# Patient Record
Sex: MALE | Race: WHITE | NOT HISPANIC OR LATINO | Employment: FULL TIME | URBAN - METROPOLITAN AREA
[De-identification: names, ages, dates, MRNs, and addresses within clinical notes are randomized per-mention and may not be internally consistent; named-entity substitution may affect disease eponyms.]

---

## 2017-01-05 ENCOUNTER — CLINICAL SUPPORT (OUTPATIENT)
Dept: OTOLARYNGOLOGY | Facility: CLINIC | Age: 47
End: 2017-01-05
Payer: COMMERCIAL

## 2017-01-05 DIAGNOSIS — J30.9 ALLERGIC RHINITIS, UNSPECIFIED ALLERGIC RHINITIS TRIGGER, UNSPECIFIED RHINITIS SEASONALITY: ICD-10-CM

## 2017-01-05 PROCEDURE — 95165 ANTIGEN THERAPY SERVICES: CPT | Mod: 59,,, | Performed by: PEDIATRICS

## 2017-01-05 PROCEDURE — 99999 PR PBB SHADOW E&M-EST. PATIENT-LVL I: CPT | Mod: PBBFAC,,,

## 2017-01-05 PROCEDURE — 95117 IMMUNOTHERAPY INJECTIONS: CPT | Mod: ,,, | Performed by: PEDIATRICS

## 2017-01-05 NOTE — PROGRESS NOTES
Date of treatment initiation: 04/09/2015  Initial SNOT-20 score: 28  Date of last followup visit with referring physician: 06/10/2016  Date next followup visit is due:06/2017  Most recent SNOT-20 score:  0  Date SNOT-20 is due: 2017    No Known Drug Allergies    Ordering Physician: Dr. Kurtz      MAINTENANCE VIALS - MANUFACTURED BY [employer]    Mix #1 - LOT# [id number]  EXP: 10/10/2017  Allergens: trees  Mix #2 - LOT# [id number]  EXP: 10/10/2017  Allergens: weeds and grassed  Mix #3 - LOT# [id number]  EXP: 10/10/2017  Allergens: molds, mites, cockroach, jason, dog, feathers      Administered 0.1 mL of red maintenance vial -  mix #1 and #2 in left arm & mix #3 in right arm subcutaneously at 1520 pm manufactured by [employer]. Patient reports that he will wait in the building for 20 minutes after injection, he states that he will contact our office with any questions, concerns, or signs of a reaction.

## 2017-01-12 ENCOUNTER — CLINICAL SUPPORT (OUTPATIENT)
Dept: OTOLARYNGOLOGY | Facility: CLINIC | Age: 47
End: 2017-01-12
Payer: COMMERCIAL

## 2017-01-12 DIAGNOSIS — J30.9 ALLERGIC RHINITIS, UNSPECIFIED ALLERGIC RHINITIS TRIGGER, UNSPECIFIED RHINITIS SEASONALITY: ICD-10-CM

## 2017-01-12 PROCEDURE — 99999 PR PBB SHADOW E&M-EST. PATIENT-LVL I: CPT | Mod: PBBFAC,,,

## 2017-01-12 PROCEDURE — 95117 IMMUNOTHERAPY INJECTIONS: CPT | Mod: S$GLB,,, | Performed by: PEDIATRICS

## 2017-01-12 NOTE — PROGRESS NOTES
Date of treatment initiation: 04/09/2015  Initial SNOT-20 score: 28  Date of last followup visit with referring physician: 06/10/2016  Date next followup visit is due:06/2017  Most recent SNOT-20 score:  0  Date SNOT-20 is due: 2017    No Known Drug Allergies    Ordering Physician: Dr. Kurtz      MAINTENANCE VIALS - MANUFACTURED BY [email or web address]    Mix #1 - LOT# [id number]  EXP: 10/10/2017  Allergens: trees  Mix #2 - LOT# [id number]  EXP: 10/10/2017  Allergens: weeds and grassed  Mix #3 - LOT# [id number]  EXP: 10/10/2017  Allergens: molds, mites, cockroach, jason, dog, feathers      Administered 0.3 mL of red maintenance vial -  mix #1 and #2 in left arm & mix #3 in right arm subcutaneously at 1535 pm manufactured by [employer]. Patient reports that he will wait in the building for 20 minutes after injection, he states that he will contact our office with any questions, concerns, or signs of a reaction.

## 2017-01-16 RX ORDER — MONTELUKAST SODIUM 10 MG/1
10 TABLET ORAL NIGHTLY
Qty: 30 TABLET | Refills: 12 | Status: SHIPPED | OUTPATIENT
Start: 2017-01-16 | End: 2017-07-10 | Stop reason: SDUPTHER

## 2017-01-16 RX ORDER — FLUTICASONE PROPIONATE 50 MCG
2 SPRAY, SUSPENSION (ML) NASAL DAILY
Qty: 2 BOTTLE | Refills: 12 | Status: SHIPPED | OUTPATIENT
Start: 2017-01-16 | End: 2017-07-10 | Stop reason: SDUPTHER

## 2017-01-25 ENCOUNTER — CLINICAL SUPPORT (OUTPATIENT)
Dept: OTOLARYNGOLOGY | Facility: CLINIC | Age: 47
End: 2017-01-25
Payer: COMMERCIAL

## 2017-01-25 DIAGNOSIS — J30.9 ALLERGIC RHINITIS, UNSPECIFIED ALLERGIC RHINITIS TRIGGER, UNSPECIFIED RHINITIS SEASONALITY: ICD-10-CM

## 2017-01-25 PROCEDURE — 95117 IMMUNOTHERAPY INJECTIONS: CPT | Mod: S$GLB,,, | Performed by: ORTHOPAEDIC SURGERY

## 2017-01-25 PROCEDURE — 99999 PR PBB SHADOW E&M-EST. PATIENT-LVL I: CPT | Mod: PBBFAC,,,

## 2017-01-26 NOTE — PROGRESS NOTES
Date of treatment initiation: 04/09/2015  Initial SNOT-20 score: 28  Date of last followup visit with referring physician: 06/10/2016  Date next followup visit is due:06/2017  Most recent SNOT-20 score:  0  Date SNOT-20 is due: 2017    No Known Drug Allergies    Ordering Physician: Dr. Kurtz      MAINTENANCE VIALS - MANUFACTURED BY [employer]    Mix #1 - LOT# [id number]  EXP: 10/10/2017  Allergens: trees  Mix #2 - LOT# [id number]  EXP: 10/10/2017  Allergens: weeds and grassed  Mix #3 - LOT# [id number]  EXP: 10/10/2017  Allergens: molds, mites, cockroach, jason, dog, feathers      Administered 0.5 mL of red maintenance vial -  mix #1 and #2 in left arm & mix #3 in right arm subcutaneously at 1555 pm manufactured by [employer]. Patient reports that he will wait in the building for 20 minutes after injection, he states that he will contact our office with any questions, concerns, or signs of a reaction.

## 2017-02-02 ENCOUNTER — CLINICAL SUPPORT (OUTPATIENT)
Dept: OTOLARYNGOLOGY | Facility: CLINIC | Age: 47
End: 2017-02-02
Payer: COMMERCIAL

## 2017-02-02 ENCOUNTER — OFFICE VISIT (OUTPATIENT)
Dept: OPHTHALMOLOGY | Facility: CLINIC | Age: 47
End: 2017-02-02
Payer: COMMERCIAL

## 2017-02-02 DIAGNOSIS — Z13.5 GLAUCOMA SCREENING: ICD-10-CM

## 2017-02-02 DIAGNOSIS — J30.9 ALLERGIC RHINITIS, UNSPECIFIED ALLERGIC RHINITIS TRIGGER, UNSPECIFIED RHINITIS SEASONALITY: ICD-10-CM

## 2017-02-02 DIAGNOSIS — Z01.00 VISIT FOR EYE AND VISION EXAM: Primary | ICD-10-CM

## 2017-02-02 DIAGNOSIS — H52.7 REFRACTIVE ERROR: ICD-10-CM

## 2017-02-02 PROCEDURE — 99999 PR PBB SHADOW E&M-EST. PATIENT-LVL I: CPT | Mod: PBBFAC,,, | Performed by: OPTOMETRIST

## 2017-02-02 PROCEDURE — 99999 PR PBB SHADOW E&M-EST. PATIENT-LVL I: CPT | Mod: PBBFAC,,,

## 2017-02-02 PROCEDURE — 92015 DETERMINE REFRACTIVE STATE: CPT | Mod: S$GLB,,, | Performed by: OPTOMETRIST

## 2017-02-02 PROCEDURE — 92014 COMPRE OPH EXAM EST PT 1/>: CPT | Mod: S$GLB,,, | Performed by: OPTOMETRIST

## 2017-02-02 PROCEDURE — 95117 IMMUNOTHERAPY INJECTIONS: CPT | Mod: S$GLB,,, | Performed by: PEDIATRICS

## 2017-02-02 NOTE — PROGRESS NOTES
Date of treatment initiation: 04/09/2015  Initial SNOT-20 score: 28  Date of last followup visit with referring physician: 06/10/2016  Date next followup visit is due:06/2017  Most recent SNOT-20 score:  0  Date SNOT-20 is due: 2017    No Known Drug Allergies    Ordering Physician: Dr. Kurtz      MAINTENANCE VIALS - MANUFACTURED BY [employer]    Mix #1 - LOT# [id number]  EXP: 10/10/2017  Allergens: trees  Mix #2 - LOT# [id number]  EXP: 10/10/2017  Allergens: weeds and grassed  Mix #3 - LOT# [id number]  EXP: 10/10/2017  Allergens: molds, mites, cockroach, jason, dog, feathers      Administered 0.5 mL of red maintenance vial -  mix #1 and #2 in left arm & mix #3 in right arm subcutaneously at 1510 pm manufactured by [employer]. Patient reports that he will wait in the building for 20 minutes after injection, he states that he will contact our office with any questions, concerns, or signs of a reaction.

## 2017-02-02 NOTE — PROGRESS NOTES
HPI     Last [facility] exam 01/21/2016  Screening for glaucoma  RE  No visual complaints  Uses +1.50 OTC Readers at near, did not bring to exam       Last edited by Marky Condon MA on 2/2/2017  3:25 PM.         Assessment /Plan     For exam results, see Encounter Report.    Visit for eye and vision exam    Glaucoma screening    Refractive error      OH OK OU.  No specs needed because of very low RE.  RTC one year.

## 2017-02-15 ENCOUNTER — CLINICAL SUPPORT (OUTPATIENT)
Dept: OTOLARYNGOLOGY | Facility: CLINIC | Age: 47
End: 2017-02-15
Payer: COMMERCIAL

## 2017-02-15 DIAGNOSIS — J30.9 ALLERGIC RHINITIS, UNSPECIFIED ALLERGIC RHINITIS TRIGGER, UNSPECIFIED RHINITIS SEASONALITY: ICD-10-CM

## 2017-02-15 PROCEDURE — 99999 PR PBB SHADOW E&M-EST. PATIENT-LVL I: CPT | Mod: PBBFAC,,,

## 2017-02-15 PROCEDURE — 95117 IMMUNOTHERAPY INJECTIONS: CPT | Mod: S$GLB,,, | Performed by: ORTHOPAEDIC SURGERY

## 2017-02-15 NOTE — PROGRESS NOTES
Date of treatment initiation: 04/09/2015  Initial SNOT-20 score: 28  Date of last followup visit with referring physician: 06/10/2016  Date next followup visit is due:06/2017  Most recent SNOT-20 score:  0  Date SNOT-20 is due: 2017    No Known Drug Allergies    Ordering Physician: Dr. Kurtz      MAINTENANCE VIALS - MANUFACTURED BY [employer]    Mix #1 - LOT# [id number]  EXP: 10/10/2017  Allergens: trees  Mix #2 - LOT# [id number]  EXP: 10/10/2017  Allergens: weeds and grassed  Mix #3 - LOT# [id number]  EXP: 10/10/2017  Allergens: molds, mites, cockroach, jason, dog, feathers      Administered 0.5 mL of red maintenance vial -  mix #1 and #2 in left arm & mix #3 in right arm subcutaneously at 1530 pm manufactured by [employer]. Patient reports that he will wait in the building for 20 minutes after injection, he states that he will contact our office with any questions, concerns, or signs of a reaction.

## 2017-02-23 ENCOUNTER — CLINICAL SUPPORT (OUTPATIENT)
Dept: OTOLARYNGOLOGY | Facility: CLINIC | Age: 47
End: 2017-02-23
Payer: COMMERCIAL

## 2017-02-23 DIAGNOSIS — J30.9 ALLERGIC RHINITIS, UNSPECIFIED ALLERGIC RHINITIS TRIGGER, UNSPECIFIED RHINITIS SEASONALITY: ICD-10-CM

## 2017-02-23 PROCEDURE — 95117 IMMUNOTHERAPY INJECTIONS: CPT | Mod: S$GLB,,, | Performed by: PEDIATRICS

## 2017-02-23 PROCEDURE — 99999 PR PBB SHADOW E&M-EST. PATIENT-LVL I: CPT | Mod: PBBFAC,,,

## 2017-02-23 NOTE — PROGRESS NOTES
Date of treatment initiation: 04/09/2015  Initial SNOT-20 score: 28  Date of last followup visit with referring physician: 06/10/2016  Date next followup visit is due:06/2017  Most recent SNOT-20 score:  0  Date SNOT-20 is due: 2017    No Known Drug Allergies    Ordering Physician: Dr. Kurtz      MAINTENANCE VIALS - MANUFACTURED BY [employer]    Mix #1 - LOT# [id number]  EXP: 10/10/2017  Allergens: trees  Mix #2 - LOT# [id number]  EXP: 10/10/2017  Allergens: weeds and grassed  Mix #3 - LOT# [id number]  EXP: 10/10/2017  Allergens: molds, mites, cockroach, jason, dog, feathers      Administered 0.5 mL of red maintenance vial -  mix #1 and #2 in left arm & mix #3 in right arm subcutaneously at 1330 pm manufactured by [employer]. Patient reports that he will wait in the building for 20 minutes after injection, he states that he will contact our office with any questions, concerns, or signs of a reaction.

## 2017-02-24 ENCOUNTER — LAB VISIT (OUTPATIENT)
Dept: LAB | Facility: HOSPITAL | Age: 47
End: 2017-02-24
Attending: PEDIATRICS
Payer: COMMERCIAL

## 2017-02-24 DIAGNOSIS — Z00.00 WELL ADULT EXAM: ICD-10-CM

## 2017-02-24 LAB
ANION GAP SERPL CALC-SCNC: 10 MMOL/L
BUN SERPL-MCNC: 16 MG/DL
CALCIUM SERPL-MCNC: 9.4 MG/DL
CHLORIDE SERPL-SCNC: 104 MMOL/L
CHOLEST/HDLC SERPL: 4.3 {RATIO}
CO2 SERPL-SCNC: 27 MMOL/L
CREAT SERPL-MCNC: 0.9 MG/DL
EST. GFR  (AFRICAN AMERICAN): >60 ML/MIN/1.73 M^2
EST. GFR  (NON AFRICAN AMERICAN): >60 ML/MIN/1.73 M^2
GLUCOSE SERPL-MCNC: 83 MG/DL
HDL/CHOLESTEROL RATIO: 23.2 %
HDLC SERPL-MCNC: 194 MG/DL
HDLC SERPL-MCNC: 45 MG/DL
LDLC SERPL CALC-MCNC: 120.8 MG/DL
NONHDLC SERPL-MCNC: 149 MG/DL
POTASSIUM SERPL-SCNC: 4.3 MMOL/L
SODIUM SERPL-SCNC: 141 MMOL/L
TRIGL SERPL-MCNC: 141 MG/DL

## 2017-02-24 PROCEDURE — 80048 BASIC METABOLIC PNL TOTAL CA: CPT

## 2017-02-24 PROCEDURE — 80061 LIPID PANEL: CPT

## 2017-02-24 PROCEDURE — 36415 COLL VENOUS BLD VENIPUNCTURE: CPT | Mod: PO

## 2017-03-02 ENCOUNTER — OFFICE VISIT (OUTPATIENT)
Dept: INTERNAL MEDICINE | Facility: CLINIC | Age: 47
End: 2017-03-02
Payer: COMMERCIAL

## 2017-03-02 ENCOUNTER — CLINICAL SUPPORT (OUTPATIENT)
Dept: OTOLARYNGOLOGY | Facility: CLINIC | Age: 47
End: 2017-03-02
Payer: COMMERCIAL

## 2017-03-02 VITALS
BODY MASS INDEX: 29.71 KG/M2 | SYSTOLIC BLOOD PRESSURE: 130 MMHG | HEART RATE: 83 BPM | OXYGEN SATURATION: 100 % | WEIGHT: 219.38 LBS | DIASTOLIC BLOOD PRESSURE: 80 MMHG | TEMPERATURE: 96 F | HEIGHT: 72 IN

## 2017-03-02 DIAGNOSIS — J30.9 ALLERGIC RHINITIS, UNSPECIFIED ALLERGIC RHINITIS TRIGGER, UNSPECIFIED RHINITIS SEASONALITY: Primary | ICD-10-CM

## 2017-03-02 DIAGNOSIS — Z00.00 WELL ADULT EXAM: Primary | ICD-10-CM

## 2017-03-02 PROCEDURE — 99999 PR PBB SHADOW E&M-EST. PATIENT-LVL III: CPT | Mod: PBBFAC,,, | Performed by: PEDIATRICS

## 2017-03-02 PROCEDURE — 99396 PREV VISIT EST AGE 40-64: CPT | Mod: S$GLB,,, | Performed by: PEDIATRICS

## 2017-03-02 PROCEDURE — 99999 PR PBB SHADOW E&M-EST. PATIENT-LVL I: CPT | Mod: PBBFAC,,,

## 2017-03-02 PROCEDURE — 95117 IMMUNOTHERAPY INJECTIONS: CPT | Mod: S$GLB,,, | Performed by: PEDIATRICS

## 2017-03-02 RX ORDER — NAPROXEN SODIUM 220 MG/1
81 TABLET, FILM COATED ORAL DAILY
Refills: 0
Start: 2017-03-02 | End: 2018-10-11

## 2017-03-02 NOTE — PROGRESS NOTES
Date of treatment initiation: 04/09/2015  Initial SNOT-20 score: 28  Date of last followup visit with referring physician: 06/10/2016  Date next followup visit is due:06/2017  Most recent SNOT-20 score:  0  Date SNOT-20 is due: 2017    No Known Drug Allergies    Ordering Physician: Dr. Kurtz      MAINTENANCE VIALS - MANUFACTURED BY [employer]    Mix #1 - LOT# [id number]  EXP: 10/10/2017  Allergens: trees  Mix #2 - LOT# [id number]  EXP: 10/10/2017  Allergens: weeds and grassed  Mix #3 - LOT# [id number]  EXP: 10/10/2017  Allergens: molds, mites, cockroach, jason, dog, feathers      Administered 0.5 mL of red maintenance vial -  mix #1 and #2 in left arm & mix #3 in right arm subcutaneously at 08:00am manufactured by [employer]. Patient reports that he will wait in the building for 20 minutes after injection, he states that he will contact our office with any questions, concerns, or signs of a reaction.

## 2017-03-02 NOTE — MR AVS SNAPSHOT
[facility]  [address]  [place] [address]  Phone: [phone]  Fax: [phone]                  Irineo Betts   3/2/2017 7:20 AM   Office Visit    Description:  Male : 1970   Provider:  SAROJ Hernández Jr., MD   Department:  [facility]           Reason for Visit     Annual Exam           Diagnoses this Visit        Comments    Well adult exam    -  Primary            To Do List           Future Appointments        Provider Department Dept Phone    2018 7:30 AM LABORATORY, [facility] [phone]    3/5/2018 7:00 AM SAROJ Hernández Jr., MD [facility] [phone]      Goals (5 Years of Data)     None      Follow-Up and Disposition     Return in about 1 year (around 3/2/2018).    Follow-up and Disposition History       These Medications        Disp Refills Start End    aspirin 81 MG Chew  0 3/2/2017 3/2/2018    Take 1 tablet (81 mg total) by mouth once daily. - Oral    [place] [facility] [place] [place] #: [phone]         [facility]     [facility] -  Assistance  Registered nurses in the [facility] provide clinical advisement, health education, appointment booking, and other advisory services.  Call for this free service at [phone].             Medications           Message regarding Medications     Verify the changes and/or additions to your medication regime listed below are the same as discussed with your clinician today.  If any of these changes or additions are incorrect, please notify your healthcare provider.        START taking these NEW medications        Refills    aspirin 81 MG Chew 0    Sig: Take 1 tablet (81 mg total) by mouth once daily.    Class: No Print    Route: Oral      STOP taking these medications     azelastine (ASTELIN) 137 mcg (0.1 %) nasal spray INSTILL 2 SPRAYS BY NASAL ROUTE TWO  TIMES A DAY           Verify that the below list of medications is an accurate representation of the medications you are currently taking.  If none reported, the list may be blank. If incorrect, please contact your healthcare provider. Carry this list with you in case of emergency.           Current Medications     fluticasone (FLONASE) 50 mcg/actuation nasal spray 2 sprays by Each Nare route once daily.    montelukast (SINGULAIR) 10 mg tablet Take 1 tablet (10 mg total) by mouth every evening.    aspirin 81 MG Chew Take 1 tablet (81 mg total) by mouth once daily.           Clinical Reference Information           Your Vitals Were     BP                   130/80 (BP Location: Right arm, Patient Position: Sitting, BP Method: Automatic)           Blood Pressure          Most Recent Value    BP  130/80      Allergies as of 3/2/2017     No Known Allergies      Immunizations Administered on Date of Encounter - 3/2/2017     None      Orders Placed During Today's Visit     Future Labs/Procedures Expected by Expires    Basic metabolic panel  3/2/2017 5/1/2018    CBC auto differential  3/2/2017 5/1/2018    Lipid panel  3/2/2017 3/2/2018      Language Assistance Services     ATTENTION: Language assistance services are available, free of charge. Please call [phone].      ATENCIÓN: Si habla grace, tiene a pichardo disposición servicios gratuitos de asistencia lingüística. Llame al [phone].     CHÚ Ý: N?u b?n nói Ti?ng Vi?t, có các d?ch v? h? tr? ngôn ng? mi?n phí dành cho b?n. G?i s? [phone].         [facility] - Internal Medicine complies with applicable Federal civil rights laws and does not discriminate on the basis of race, color, national origin, age, disability, or sex.

## 2017-03-02 NOTE — PROGRESS NOTES
Subjective:        Patient ID: Irineo Betts is a 47 y.o. male.     Chief Complaint: Wellness exam     HPI Comments: PMH/PSH/SH/FH reviewed and updated in EPIC. He has been working on D&E. Lipids improved, ASCVD risk down to 2.1%. LABS REVIEWED AND DISCUSSED WITH PATIENT     Review of Systems   Constitutional: Negative for fever and unexpected weight change.   HENT: Negative for congestion and rhinorrhea. Allergies doing well.  Eyes: Negative for discharge and redness.   Respiratory: Negative for cough and wheezing.   Cardiovascular: Negative for chest pain, palpitations and leg swelling.   Gastrointestinal: Negative for vomiting, diarrhea and constipation.   Endocrine: Negative for cold intolerance, heat intolerance, polydipsia, polyphagia and polyuria.   Genitourinary: Negative for decreased urine volume and difficulty urinating.   Musculoskeletal: Negative for joint swelling and arthralgias.   Skin: Negative for rash and wound.   Neurological: Negative for syncope and headaches.   Psychiatric/Behavioral: Negative for behavioral problems and sleep disturbance.       Objective:      Physical Exam   Constitutional: He appears well-developed and well-nourished. No distress.   HENT:   Head: Normocephalic and atraumatic.   Right Ear: Tympanic membrane normal.   Left Ear: Tympanic membrane normal.   Mouth/Throat: Uvula is midline and mucous membranes are normal. Normal dentition.   Eyes: Conjunctivae, EOM and lids are normal. Pupils are equal, round, and reactive to light.   Neck: Trachea normal and normal range of motion. Neck supple. No JVD present. No thyromegaly present.   Cardiovascular: Normal rate, regular rhythm, S1 normal, S2 normal, normal heart sounds and normal pulses. Exam reveals no gallop and no friction rub.   No murmur heard.  Pulmonary/Chest: Effort normal and breath sounds normal. He has no wheezes. He has no rales.   Abdominal: Soft. Normal appearance and bowel sounds are normal. He exhibits  no mass. There is no hepatosplenomegaly. There is no tenderness. There is no rebound and no guarding.   Musculoskeletal: Normal range of motion. He exhibits no edema.   Lymphadenopathy:   He has no cervical adenopathy.   Neurological: He is alert. He has normal strength. Coordination and gait normal.   Skin: Skin is warm and intact. No rash noted.   Psychiatric: He has a normal mood and affect. His speech is normal and behavior is normal. Judgment and thought content normal.       Assessment:       1. Well adult            Plan:       D&E, weight loss, mediterranean diet, almonds. HM issues discussed. Baby asa. F/U yearly.

## 2017-03-09 ENCOUNTER — CLINICAL SUPPORT (OUTPATIENT)
Dept: OTOLARYNGOLOGY | Facility: CLINIC | Age: 47
End: 2017-03-09
Payer: COMMERCIAL

## 2017-03-09 ENCOUNTER — PATIENT MESSAGE (OUTPATIENT)
Dept: OTOLARYNGOLOGY | Facility: CLINIC | Age: 47
End: 2017-03-09

## 2017-03-09 DIAGNOSIS — J30.9 ALLERGIC RHINITIS, UNSPECIFIED ALLERGIC RHINITIS TRIGGER, UNSPECIFIED RHINITIS SEASONALITY: ICD-10-CM

## 2017-03-09 PROCEDURE — 95117 IMMUNOTHERAPY INJECTIONS: CPT | Mod: S$GLB,,, | Performed by: PEDIATRICS

## 2017-03-09 NOTE — PROGRESS NOTES
Date of treatment initiation: 04/09/2015  Initial SNOT-20 score: 28  Date of last followup visit with referring physician: 06/10/2016  Date next followup visit is due:06/2017  Most recent SNOT-20 score:  0  Date SNOT-20 is due: 09.09.17    No Known Drug Allergies    Ordering Physician: Dr. Kurtz      MAINTENANCE VIALS - MANUFACTURED BY [employer]    Mix #1 - LOT# [id number]  EXP: 10/10/2017  Allergens: trees  Mix #2 - LOT# [id number]  EXP: 10/10/2017  Allergens: weeds and grassed  Mix #3 - LOT# [id number]  EXP: 10/10/2017  Allergens: molds, mites, cockroach, jason, dog, feathers      Administered 0.5 mL of red maintenance vial -  mix #1 and #2 in left arm & mix #3 in right arm subcutaneously at 1450 pm manufactured by [employer]. Patient reports that he will wait in the building for 20 minutes after injection, he states that he will contact our office with any questions, concerns, or signs of a reaction.

## 2017-03-22 ENCOUNTER — CLINICAL SUPPORT (OUTPATIENT)
Dept: OTOLARYNGOLOGY | Facility: CLINIC | Age: 47
End: 2017-03-22
Payer: COMMERCIAL

## 2017-03-22 DIAGNOSIS — J30.9 ALLERGIC RHINITIS, UNSPECIFIED ALLERGIC RHINITIS TRIGGER, UNSPECIFIED RHINITIS SEASONALITY: ICD-10-CM

## 2017-03-22 PROCEDURE — 99999 PR PBB SHADOW E&M-EST. PATIENT-LVL I: CPT | Mod: PBBFAC,,,

## 2017-03-22 PROCEDURE — 95117 IMMUNOTHERAPY INJECTIONS: CPT | Mod: S$GLB,,, | Performed by: ORTHOPAEDIC SURGERY

## 2017-03-22 NOTE — PROGRESS NOTES
Date of treatment initiation: 04/09/2015  Initial SNOT-20 score: 28  Date of last followup visit with referring physician: 06/10/2016  Date next followup visit is due:06/2017  Most recent SNOT-20 score:  0  Date SNOT-20 is due: 09.09.17    No Known Drug Allergies    Ordering Physician: Dr. Kurzt      MAINTENANCE VIALS - MANUFACTURED BY [employer]    Mix #1 - LOT# [id number]  EXP: 10/10/2017  Allergens: trees  Mix #2 - LOT# [id number]  EXP: 10/10/2017  Allergens: weeds and grassed  Mix #3 - LOT# [id number]  EXP: 10/10/2017  Allergens: molds, mites, cockroach, jason, dog, feathers      Administered 0.5 mL of red maintenance vial -  mix #1 and #2 in left arm & mix #3 in right arm subcutaneously at 1550 pm manufactured by [employer]. Patient reports that he will wait in the building for 20 minutes after injection, he states that he will contact our office with any questions, concerns, or signs of a reaction.

## 2017-04-06 ENCOUNTER — CLINICAL SUPPORT (OUTPATIENT)
Dept: OTOLARYNGOLOGY | Facility: CLINIC | Age: 47
End: 2017-04-06
Payer: COMMERCIAL

## 2017-04-06 DIAGNOSIS — J30.9 ALLERGIC RHINITIS, UNSPECIFIED ALLERGIC RHINITIS TRIGGER, UNSPECIFIED RHINITIS SEASONALITY: ICD-10-CM

## 2017-04-06 PROCEDURE — 99999 PR PBB SHADOW E&M-EST. PATIENT-LVL I: CPT | Mod: PBBFAC,,,

## 2017-04-06 PROCEDURE — 95117 IMMUNOTHERAPY INJECTIONS: CPT | Mod: S$GLB,,, | Performed by: PEDIATRICS

## 2017-04-06 NOTE — PROGRESS NOTES
Date of treatment initiation: 04/09/2015  Initial SNOT-20 score: 28  Date of last followup visit with referring physician: 06/10/2016  Date next followup visit is due:06/2017  Most recent SNOT-20 score:  0  Date SNOT-20 is due: 09.09.17    No Known Drug Allergies    Ordering Physician: Dr. Kurtz      MAINTENANCE VIALS - MANUFACTURED BY [employer]    Mix #1 - LOT# [id number]  EXP: 10/10/2017  Allergens: trees  Mix #2 - LOT# [id number]  EXP: 10/10/2017  Allergens: weeds and grassed  Mix #3 - LOT# [id number]  EXP: 10/10/2017  Allergens: molds, mites, cockroach, jason, dog, feathers      Administered 0.5 mL of red maintenance vial -  mix #1 and #2 in left arm & mix #3 in right arm subcutaneously at 1125 am manufactured by [employer]. Patient reports that he will wait in the building for 20 minutes after injection, he states that he will contact our office with any questions, concerns, or signs of a reaction.

## 2017-04-20 ENCOUNTER — CLINICAL SUPPORT (OUTPATIENT)
Dept: OTOLARYNGOLOGY | Facility: CLINIC | Age: 47
End: 2017-04-20
Payer: COMMERCIAL

## 2017-04-20 DIAGNOSIS — J30.9 ALLERGIC RHINITIS, UNSPECIFIED ALLERGIC RHINITIS TRIGGER, UNSPECIFIED RHINITIS SEASONALITY: ICD-10-CM

## 2017-04-20 PROCEDURE — 95117 IMMUNOTHERAPY INJECTIONS: CPT | Mod: S$GLB,,, | Performed by: PEDIATRICS

## 2017-04-20 PROCEDURE — 99999 PR PBB SHADOW E&M-EST. PATIENT-LVL I: CPT | Mod: PBBFAC,,,

## 2017-04-25 NOTE — PROGRESS NOTES
Date of treatment initiation: 04/09/2015  Initial SNOT-20 score: 28  Date of last followup visit with referring physician: 06/10/2016  Date next followup visit is due:06/2017  Most recent SNOT-20 score:  0  Date SNOT-20 is due: 09.09.17    No Known Drug Allergies    Ordering Physician: Dr. Kurtz      MAINTENANCE VIALS - MANUFACTURED BY [employer]    Mix #1 - LOT# [id number]  EXP: 10/10/2017  Allergens: trees  Mix #2 - LOT# [id number]  EXP: 10/10/2017  Allergens: weeds and grassed  Mix #3 - LOT# [id number]  EXP: 10/10/2017  Allergens: molds, mites, cockroach, jason, dog, feathers      Administered 0.5 mL of red maintenance vial -  mix #1 and #2 in left arm & mix #3 in right arm subcutaneously at 1340 pm manufactured by [employer]. Patient reports that he will wait in the building for 20 minutes after injection, he states that he will contact our office with any questions, concerns, or signs of a reaction.

## 2017-05-04 ENCOUNTER — CLINICAL SUPPORT (OUTPATIENT)
Dept: OTOLARYNGOLOGY | Facility: CLINIC | Age: 47
End: 2017-05-04
Payer: COMMERCIAL

## 2017-05-04 DIAGNOSIS — J30.9 ALLERGIC RHINITIS, UNSPECIFIED ALLERGIC RHINITIS TRIGGER, UNSPECIFIED RHINITIS SEASONALITY: ICD-10-CM

## 2017-05-04 PROCEDURE — 95117 IMMUNOTHERAPY INJECTIONS: CPT | Mod: S$GLB,,, | Performed by: PEDIATRICS

## 2017-05-04 PROCEDURE — 99999 PR PBB SHADOW E&M-EST. PATIENT-LVL I: CPT | Mod: PBBFAC,,,

## 2017-05-04 NOTE — PROGRESS NOTES
Date of treatment initiation: 04/09/2015  Initial SNOT-20 score: 28  Date of last followup visit with referring physician: 06/10/2016  Date next followup visit is due:06/2017  Most recent SNOT-20 score:  0  Date SNOT-20 is due: 09.09.17    No Known Drug Allergies    Ordering Physician: Dr. Kurtz      MAINTENANCE VIALS - MANUFACTURED BY [employer]    Mix #1 - LOT# [id number]  EXP: 10/10/2017  Allergens: trees  Mix #2 - LOT# [id number]  EXP: 10/10/2017  Allergens: weeds and grassed  Mix #3 - LOT# [id number]  EXP: 10/10/2017  Allergens: molds, mites, cockroach, jason, dog, feathers      Administered 0.5 mL of red maintenance vial -  mix #1 and #2 in left arm & mix #3 in right arm subcutaneously at 1420 pm manufactured by [employer]. Patient reports that he will wait in the building for 20 minutes after injection, he states that he will contact our office with any questions, concerns, or signs of a reaction.

## 2017-05-17 ENCOUNTER — CLINICAL SUPPORT (OUTPATIENT)
Dept: OTOLARYNGOLOGY | Facility: CLINIC | Age: 47
End: 2017-05-17
Payer: COMMERCIAL

## 2017-05-17 DIAGNOSIS — J30.9 ALLERGIC RHINITIS, UNSPECIFIED ALLERGIC RHINITIS TRIGGER, UNSPECIFIED RHINITIS SEASONALITY: ICD-10-CM

## 2017-05-17 PROCEDURE — 95117 IMMUNOTHERAPY INJECTIONS: CPT | Mod: S$GLB,,, | Performed by: ORTHOPAEDIC SURGERY

## 2017-05-17 NOTE — PROGRESS NOTES
Date of treatment initiation: 04/09/2015  Initial SNOT-20 score: 28  Date of last followup visit with referring physician: 06/10/2016  Date next followup visit is due:06/2017  Most recent SNOT-20 score:  0  Date SNOT-20 is due: 09.09.17    No Known Drug Allergies    Ordering Physician: Dr. Kurtz      MAINTENANCE VIALS - MANUFACTURED BY [employer]    Mix #1 - LOT# [id number]  EXP: 10/10/2017  Allergens: trees  Mix #2 - LOT# [id number]  EXP: 10/10/2017  Allergens: weeds and grassed  Mix #3 - LOT# [id number]  EXP: 10/10/2017  Allergens: molds, mites, cockroach, jason, dog, feathers      Administered 0.5 mL of red maintenance vial -  mix #1 and #2 in left arm & mix #3 in right arm subcutaneously at 0815 am manufactured by [employer]. Patient reports that he will wait in the building for 20 minutes after injection, he states that he will contact our office with any questions, concerns, or signs of a reaction.

## 2017-06-01 ENCOUNTER — CLINICAL SUPPORT (OUTPATIENT)
Dept: OTOLARYNGOLOGY | Facility: CLINIC | Age: 47
End: 2017-06-01
Payer: COMMERCIAL

## 2017-06-01 DIAGNOSIS — J30.9 ALLERGIC RHINITIS, UNSPECIFIED ALLERGIC RHINITIS TRIGGER, UNSPECIFIED RHINITIS SEASONALITY: ICD-10-CM

## 2017-06-01 PROCEDURE — 95117 IMMUNOTHERAPY INJECTIONS: CPT | Mod: S$GLB,,, | Performed by: PEDIATRICS

## 2017-06-01 PROCEDURE — 99999 PR PBB SHADOW E&M-EST. PATIENT-LVL I: CPT | Mod: PBBFAC,,,

## 2017-06-01 NOTE — PROGRESS NOTES
Date of treatment initiation: 04/09/2015  Initial SNOT-20 score: 28  Date of last followup visit with referring physician: 06/10/2016  Date next followup visit is due:06/2017  Most recent SNOT-20 score:  0  Date SNOT-20 is due: 09.09.17    No Known Drug Allergies    Ordering Physician: Dr. Kurtz      MAINTENANCE VIALS - MANUFACTURED BY [employer]    Mix #1 - LOT# [id number]  EXP: 10/10/2017  Allergens: trees  Mix #2 - LOT# [id number]  EXP: 10/10/2017  Allergens: weeds and grassed  Mix #3 - LOT# [id number]  EXP: 10/10/2017  Allergens: molds, mites, cockroach, jason, dog, feathers      Administered 0.5 mL of red maintenance vial -  mix #1 and #2 in left arm & mix #3 in right arm subcutaneously at 1015 am manufactured by [employer]. Patient reports that he will wait in the building for 20 minutes after injection, he states that he will contact our office with any questions, concerns, or signs of a reaction.

## 2017-06-02 ENCOUNTER — PATIENT MESSAGE (OUTPATIENT)
Dept: OTOLARYNGOLOGY | Facility: CLINIC | Age: 47
End: 2017-06-02

## 2017-06-05 ENCOUNTER — PATIENT MESSAGE (OUTPATIENT)
Dept: OTOLARYNGOLOGY | Facility: CLINIC | Age: 47
End: 2017-06-05

## 2017-06-22 ENCOUNTER — CLINICAL SUPPORT (OUTPATIENT)
Dept: OTOLARYNGOLOGY | Facility: CLINIC | Age: 47
End: 2017-06-22
Payer: COMMERCIAL

## 2017-06-22 DIAGNOSIS — J30.9 ALLERGIC RHINITIS, UNSPECIFIED ALLERGIC RHINITIS TRIGGER, UNSPECIFIED RHINITIS SEASONALITY: ICD-10-CM

## 2017-06-22 PROCEDURE — 99999 PR PBB SHADOW E&M-EST. PATIENT-LVL I: CPT | Mod: PBBFAC,,,

## 2017-06-22 PROCEDURE — 95117 IMMUNOTHERAPY INJECTIONS: CPT | Mod: S$GLB,,, | Performed by: OTOLARYNGOLOGY

## 2017-06-29 NOTE — PROGRESS NOTES
Date of treatment initiation: 04/09/2015  Initial SNOT-20 score: 28  Date of last followup visit with referring physician: 06/10/2016  Date next followup visit is due:06/2017  Most recent SNOT-20 score:  0  Date SNOT-20 is due: 09.09.17    No Known Drug Allergies    Ordering Physician: Dr. Kurtz      MAINTENANCE VIALS - MANUFACTURED BY "[employer]    Mix #1 - LOT# [id number]  EXP: 10/10/2017  Allergens: trees  Mix #2 - LOT# [id number]  EXP: 10/10/2017  Allergens: weeds and grassed  Mix #3 - LOT# [id number]  EXP: 10/10/2017  Allergens: molds, mites, cockroach, jason, dog, feathers      Administered 0.4 mL of red maintenance vial -  mix #1 and #2 in left arm & mix #3 in right arm subcutaneously at 1035 am manufactured by [employer]. Patient reports that he will wait in the building for 20 minutes after injection, he states that he will contact our office with any questions, concerns, or signs of a reaction.

## 2017-07-06 ENCOUNTER — CLINICAL SUPPORT (OUTPATIENT)
Dept: OTOLARYNGOLOGY | Facility: CLINIC | Age: 47
End: 2017-07-06
Payer: COMMERCIAL

## 2017-07-06 DIAGNOSIS — J30.9 ALLERGIC RHINITIS, UNSPECIFIED CHRONICITY, UNSPECIFIED SEASONALITY, UNSPECIFIED TRIGGER: ICD-10-CM

## 2017-07-06 PROCEDURE — 95117 IMMUNOTHERAPY INJECTIONS: CPT | Mod: S$GLB,,, | Performed by: OTOLARYNGOLOGY

## 2017-07-06 PROCEDURE — 99999 PR PBB SHADOW E&M-EST. PATIENT-LVL I: CPT | Mod: PBBFAC,,,

## 2017-07-10 ENCOUNTER — OFFICE VISIT (OUTPATIENT)
Dept: OTOLARYNGOLOGY | Facility: CLINIC | Age: 47
End: 2017-07-10
Payer: COMMERCIAL

## 2017-07-10 VITALS
RESPIRATION RATE: 18 BRPM | DIASTOLIC BLOOD PRESSURE: 90 MMHG | WEIGHT: 220.44 LBS | HEIGHT: 72 IN | BODY MASS INDEX: 29.86 KG/M2 | TEMPERATURE: 98 F | HEART RATE: 78 BPM | SYSTOLIC BLOOD PRESSURE: 137 MMHG

## 2017-07-10 DIAGNOSIS — J33.9 NASAL POLYPOSIS: Primary | ICD-10-CM

## 2017-07-10 DIAGNOSIS — J32.4 CHRONIC PANSINUSITIS: ICD-10-CM

## 2017-07-10 PROCEDURE — 99999 PR PBB SHADOW E&M-EST. PATIENT-LVL III: CPT | Mod: PBBFAC,,, | Performed by: OTOLARYNGOLOGY

## 2017-07-10 PROCEDURE — 31231 NASAL ENDOSCOPY DX: CPT | Mod: S$GLB,,, | Performed by: OTOLARYNGOLOGY

## 2017-07-10 PROCEDURE — 99213 OFFICE O/P EST LOW 20 MIN: CPT | Mod: 25,S$GLB,, | Performed by: OTOLARYNGOLOGY

## 2017-07-10 RX ORDER — FLUTICASONE PROPIONATE 50 MCG
2 SPRAY, SUSPENSION (ML) NASAL DAILY
Qty: 2 BOTTLE | Refills: 12 | Status: SHIPPED | OUTPATIENT
Start: 2017-07-10 | End: 2017-08-14 | Stop reason: SDUPTHER

## 2017-07-10 RX ORDER — MONTELUKAST SODIUM 10 MG/1
10 TABLET ORAL NIGHTLY
Qty: 30 TABLET | Refills: 12 | Status: SHIPPED | OUTPATIENT
Start: 2017-07-10 | End: 2017-08-14 | Stop reason: SDUPTHER

## 2017-07-10 NOTE — PROGRESS NOTES
Subjective:   Patient: Irineo Betts [id number], :1970   Visit date:7/10/2017 12:50 PM    Chief Complaint: Status post sinus surgery    HPI:  Irineo ANGELES is a 47 y.o. male with chronic sinusitis.    Subjective:  Feels great.  No obstruction.  No drainage bilaterally.  He feels dramatically better and is now able to essentially eat or drink anything.  Prior to surgery and immunotherapy he had severe nasal obstruction and discomfort when in contact with alcohol, spicy foods or outside.  At this point he has no nasal complaints whatsoever.  I contacted him via [facility] about 1 month ago and he was doing extremely well subjectively so I advised him to stop the singulair and flonase.  Now only on SCIT.       Medical Regimen: SCIT    Surgery: 2015   Technical Procedures Used:     Stereotactic computer assisted navigational procedure; cranial, extradural  Bilateral total ethmoidectomy  Bilateral sphenoidotomy  Bilateral frontal sinusotomy  Right maxillary sinusotomy with removal of tissue  Left maxillary sinusotomy    Procedure in Detail/Findings:    Endoscopic Sinonasal Exam Findings at time of Surgery:  - The right side has fabrice polyps obliterating meati and superior 1/3rd nasal cavity, but patent nasal airway  - The left side has fabrice polyps obliterating meati and superior 1/3rd nasal cavity, but patent nasal airway  - Nasal secretions: thick glue-like mucous on the left FRONTAL RECESS  - Nasal septum: no significant deviation and no perforation appreciated   - Inferior turbinate: - normal mucosa without significant hypertrophy - bilaterally  - Middle turbinate: - normal mucosa without significant hypertrophy - bilaterally  - Other findings: - no mass or obstructive lesion -    Final path: benign  OR Culture: Oropharyngeal genet with no predominant isolate      SNOT 20 (total) =   Sino-Nasal Outcome Test (SNOT-20) 3/9/2017 6/10/2016 2015 2015 3/6/2015 2015 2015   Need to blow nose 0  0 0 2 2 3 1   Sneezing 0 0 0 0 1 0 0   Runny nose 0 0 0 0 0 0 0   Cough 0 0 0 0 1 0 3   Post-nasal discharge 0 0 0 1 1 5 1   Thick nasal discharge 0 0 0 0 1 5 1   Ear fullness 0 0 0 0 0 0 0   Dizziness 0 0 0 0 0 0 0   Ear pain 0 0 0 0 0 0 0   Facial pain/pressure 0 0 0 0 0 1 0   Difficulty falling asleep 0 0 0 0 0 0 2   Wake up at night 0 0 1 1 1 3 1   Lack of a good night's sleep 0 0 0 1 1 3 3   Wake up tired 0 0 0 0 1 3 1   Fatigue 0 0 0 0 1 3 0   Reduced productivity 0 0 0 0 0 1 1   Reduced concentration 0 0 0 1 0 1 1   Frustrated/restless/irritable 0 0 0 1 1 0 1   Sad 0 0 0 0 0 0 0   Embarrassed 0 0 0 0 0 0 0   SNOT Total Score 0 0 1 7 11 28 16     (higher score indicating greater rhinosinusitis-related health burden; range 0-100)      Review of Systems:  -     Allergic/Immunologic: has No Known Allergies..  -     Constitutional: Current temp: 97.8 °F (36.6 °C) (Tympanic)    His meds, allergies, medical, surgical, social & family histories were reviewed & updated:  -     He has a current medication list which includes the following prescription(s): aspirin, fluticasone, and montelukast.  -     He  has a past medical history of Benign heart murmur and Nephrolithiasis.   -     He  does not have any pertinent problems on file.   -     He  has a past surgical history that includes Undescended testicle exploration.  -     He  reports that he has never smoked. He has never used smokeless tobacco. He reports that he drinks alcohol. He reports that he does not use drugs.  -     His family history includes Cancer in his maternal grandfather and maternal grandmother; Heart disease in his father.  -     He has No Known Allergies.    Objective:     Physical Exam:  Vitals:    BP (!) 137/90   Pulse 78   Temp 97.8 °F (36.6 °C) (Tympanic)   Resp 18   Ht 6' (1.829 m)   Wt 100 kg (220 lb 7.4 oz)   BMI 29.90 kg/m²   Communication:  Able to communicate, no hoarseness.  Head & Face:  Normocephalic, atraumatic, no sinus  tenderness.  Eyes:  Extraocular motions intact.  Ears:  Otoscopy of external auditory canals and tympanic membranes was normal, clinical speech reception thresholds grossly intact, no mass/lesion of auricle.  Nose:  No masses/lesions of external nose, nasal mucosa, septum, and turbinates were within normal limits.  Mouth:  No mass/lesion of lips, teeth, gums, hard/soft palate, tongue, tonsils, or oropharynx.  Neck & Lymphatics:  No cervical lymphadenopathy, no neck mass/crepitus/ asymmetry, trachea is midline, no thyroid enlargement/tenderness/mass.  Neuro/Psych: Alert with normal mood and affect.   Respiration/Chest:  Symmetric expansion during respiration, normal respiratory effort.  Skin:  Warm and intact.    Assessment & Plan:   Irineo ANGELES was seen today for follow-up.    Diagnoses and all orders for this visit:    Nasal polyposis  -     montelukast (SINGULAIR) 10 mg tablet; Take 1 tablet (10 mg total) by mouth every evening.  -     fluticasone (FLONASE) 50 mcg/actuation nasal spray; 2 sprays by Each Nare route once daily.    Chronic pansinusitis  -     montelukast (SINGULAIR) 10 mg tablet; Take 1 tablet (10 mg total) by mouth every evening.  -     fluticasone (FLONASE) 50 mcg/actuation nasal spray; 2 sprays by Each Nare route once daily.      Irineo is doing extremely well approximately after endoscopic sinus surgery now almost 30 months ago.    We discussed that from time to time he may have sinus infections and I would like him to let me know immediately so that we can begin aggressive therapy and prevent recurrence of significant disease.  Objective sinus test measurements show dramatic improvement in symptoms.     I will restart Flonase and Singulair based on endoscopy findings of some edema of nasal mucosa.  Will plan for RTC in 6 months.            Patient: Irineo Betts [id number], :1970  Procedure date:7/10/2017  Patient's medications, allergies, past medical, surgical, social and family  histories were reviewed and updated as appropriate.  Chief Complaint:  Follow-up    HPI:  Irineo ANGELES is a 47 y.o. male with chronic sinusitis.  Procedure: Risks, benefits, and alternatives of the procedure were discussed with the patient, and the patient consented to the nasal endoscopy.  The nasal cavity was sprayed with a topical decongestant and anesthetic (if needed). The endoscope was passed into each nostril and each nasal cavity was visualized.  On each side the nasal cavity, sinuses (if open), turbinates, and septum were examined with the findings described below. At the end of the examination, the scope was removed. The patient tolerated the procedure well with no complications.     Endoscopic Sinonasal Exam Findings:  -     Sinuses examined: bilateral maxillary, bilateral ethmoid anterior and posterior and bilateral sphenoid            The right sinus(es) have mild edema (none noted on prior exam)            The left sinus(es) have early polypoid changes in the posterior ethmoids (I am not sure that this has significantly changed from prior exams)  -     Nasal secretions: No significant nasal secretions appreciated bilaterally.  -     Nasal septum: no significant deviation and septal perforation size = 1cm cm and location = posterior   -     Inferior turbinate: - normal mucosa without significant hypertrophy - bilaterally  -     Middle turbinate: turbinate partially resected bilaterally  -     Other findings: - no mass or obstructive lesion -    Assessment & Plan:  - See today's clinic note

## 2017-07-19 NOTE — PROGRESS NOTES
Date of treatment initiation: 04/09/2015  Initial SNOT-20 score: 28  Date of last followup visit with referring physician: 06/10/2016  Date next followup visit is due:06/2017  Most recent SNOT-20 score:  0  Date SNOT-20 is due: 09.09.17    No Known Drug Allergies    Ordering Physician: Dr. Kurtz      MAINTENANCE VIALS - MANUFACTURED BY [employer]    Mix #1 - LOT# [id number]  EXP: 10/10/2017  Allergens: trees  Mix #2 - LOT# [id number]  EXP: 10/10/2017  Allergens: weeds and grassed  Mix #3 - LOT# [id number]  EXP: 10/10/2017  Allergens: molds, mites, cockroach, jason, dog, feathers      Administered 0.45 mL of red maintenance vial -  mix #1 and #2 in left arm & mix #3 in right arm subcutaneously at 1430 pm manufactured by [employer]. Patient reports that he will wait in the building for 20 minutes after injection, he states that he will contact our office with any questions, concerns, or signs of a reaction.

## 2017-07-26 ENCOUNTER — CLINICAL SUPPORT (OUTPATIENT)
Dept: OTOLARYNGOLOGY | Facility: CLINIC | Age: 47
End: 2017-07-26
Payer: COMMERCIAL

## 2017-07-26 DIAGNOSIS — J30.9 ALLERGIC RHINITIS, UNSPECIFIED CHRONICITY, UNSPECIFIED SEASONALITY, UNSPECIFIED TRIGGER: ICD-10-CM

## 2017-07-26 PROCEDURE — 99999 PR PBB SHADOW E&M-EST. PATIENT-LVL I: CPT | Mod: PBBFAC,,,

## 2017-07-26 PROCEDURE — 95117 IMMUNOTHERAPY INJECTIONS: CPT | Mod: S$GLB,,, | Performed by: OTOLARYNGOLOGY

## 2017-07-26 NOTE — PROGRESS NOTES
Date of treatment initiation: 04/09/2015  Initial SNOT-20 score: 28  Date of last followup visit with referring physician: 06/10/2016  Date next followup visit is due:06/2017  Most recent SNOT-20 score:  0  Date SNOT-20 is due: 09.09.17    No Known Drug Allergies    Ordering Physician: Dr. Kurtz      MAINTENANCE VIALS - MANUFACTURED BY [employer]    Mix #1 - LOT# [id number]  EXP: 10/10/2017  Allergens: trees  Mix #2 - LOT# [id number]  EXP: 10/10/2017  Allergens: weeds and grassed  Mix #3 - LOT# [id number]  EXP: 10/10/2017  Allergens: molds, mites, cockroach, jason, dog, feathers      Administered 0.4 mL of red maintenance vial -  mix #1 and #2 in left arm & mix #3 in right arm subcutaneously at 1010 am manufactured by [employer]. Patient reports that he will wait in the building for 20 minutes after injection, he states that he will contact our office with any questions, concerns, or signs of a reaction.

## 2017-08-14 DIAGNOSIS — J32.4 CHRONIC PANSINUSITIS: ICD-10-CM

## 2017-08-14 DIAGNOSIS — J33.9 NASAL POLYPOSIS: ICD-10-CM

## 2017-08-14 RX ORDER — FLUTICASONE PROPIONATE 50 MCG
2 SPRAY, SUSPENSION (ML) NASAL DAILY
Qty: 2 BOTTLE | Refills: 12 | Status: SHIPPED | OUTPATIENT
Start: 2017-08-14 | End: 2017-12-01 | Stop reason: SDUPTHER

## 2017-08-14 RX ORDER — MONTELUKAST SODIUM 10 MG/1
10 TABLET ORAL NIGHTLY
Qty: 30 TABLET | Refills: 12 | Status: SHIPPED | OUTPATIENT
Start: 2017-08-14 | End: 2017-09-20 | Stop reason: SDUPTHER

## 2017-09-20 DIAGNOSIS — J32.4 CHRONIC PANSINUSITIS: ICD-10-CM

## 2017-09-20 DIAGNOSIS — J33.9 NASAL POLYPOSIS: ICD-10-CM

## 2017-09-20 RX ORDER — MONTELUKAST SODIUM 10 MG/1
10 TABLET ORAL NIGHTLY
Qty: 30 TABLET | Refills: 12 | Status: SHIPPED | OUTPATIENT
Start: 2017-09-20 | End: 2017-12-01 | Stop reason: SDUPTHER

## 2017-10-12 ENCOUNTER — CLINICAL SUPPORT (OUTPATIENT)
Dept: OTOLARYNGOLOGY | Facility: CLINIC | Age: 47
End: 2017-10-12
Payer: COMMERCIAL

## 2017-10-12 DIAGNOSIS — J30.9 ALLERGIC RHINITIS, UNSPECIFIED CHRONICITY, UNSPECIFIED SEASONALITY, UNSPECIFIED TRIGGER: ICD-10-CM

## 2017-10-12 PROCEDURE — 95117 IMMUNOTHERAPY INJECTIONS: CPT | Mod: S$GLB,,, | Performed by: OTOLARYNGOLOGY

## 2017-10-12 PROCEDURE — 99999 PR PBB SHADOW E&M-EST. PATIENT-LVL I: CPT | Mod: PBBFAC,,,

## 2017-10-18 NOTE — PROGRESS NOTES
Date of treatment initiation: 04/09/2015  Initial SNOT-20 score: 28  Date of last followup visit with referring physician: 06/10/2016  Date next followup visit is due:06/2017  Most recent SNOT-20 score:  0  Date SNOT-20 is due: 09.09.17    No Known Drug Allergies    Ordering Physician: Dr. Kurtz      MAINTENANCE VIALS - MANUFACTURED BY [employer]    Mix #1 - LOT# [id number]  EXP: 12/10/2017  Allergens: trees  Mix #2 - LOT# [id number]  EXP: 12/10/2017  Allergens: weeds and grassed  Mix #3 - LOT# [id number]  EXP: 12/10/2017  Allergens: molds, mites, cockroach, jason, dog, feathers      Administered 0.25 mL of red maintenance vial -  mix #1 and #2 in left arm & mix #3 in right arm subcutaneously at 0900 am manufactured by [employer]. Patient reports that he will wait in the building for 20 minutes after injection, he states that he will contact our office with any questions, concerns, or signs of a reaction.

## 2017-10-30 ENCOUNTER — OFFICE VISIT (OUTPATIENT)
Dept: OPHTHALMOLOGY | Facility: CLINIC | Age: 47
End: 2017-10-30
Payer: COMMERCIAL

## 2017-10-30 DIAGNOSIS — S00.11XA PERIORBITAL ECCHYMOSIS, RIGHT, INITIAL ENCOUNTER: ICD-10-CM

## 2017-10-30 DIAGNOSIS — S05.01XA ABRASION OF RIGHT CORNEA, INITIAL ENCOUNTER: Primary | ICD-10-CM

## 2017-10-30 PROCEDURE — 92012 INTRM OPH EXAM EST PATIENT: CPT | Mod: S$GLB,,, | Performed by: OPTOMETRIST

## 2017-10-30 PROCEDURE — 99999 PR PBB SHADOW E&M-EST. PATIENT-LVL II: CPT | Mod: PBBFAC,,, | Performed by: OPTOMETRIST

## 2017-10-30 RX ORDER — TOBRAMYCIN 3 MG/ML
1 SOLUTION/ DROPS OPHTHALMIC 4 TIMES DAILY
Qty: 5 ML | Refills: 0
Start: 2017-10-30 | End: 2017-11-03

## 2017-10-30 NOTE — PROGRESS NOTES
HPI     Last [facility] exam 02/02/2017  Patient was trimming tree branches on Saturday and was hit beneath the   lower eyelid right eye.  Patient wants to make sure that he did not scratch his cornea during this   accident  Patient did use cold and warm compresses and eye is feeling much better   today    Last edited by Marky Condon MA on 10/30/2017  8:13 AM. (History)            Assessment /Plan     For exam results, see Encounter Report.    Abrasion of right cornea, initial encounter  -     tobramycin sulfate 0.3% (TOBREX) 0.3 % ophthalmic solution; Place 1 drop into the right eye 4 (four) times daily.  Dispense: 5 mL; Refill: 0    Periorbital ecchymosis, right, initial encounter      Mild ecchymosis RLL with 98% healed corneal abrasion OD.  No A/C reaction. Prophylactic AB above.  Urged to RTC if S/S worsen or are not resolved in 3 days, otherwise RTC prn.

## 2017-12-01 ENCOUNTER — TELEPHONE (OUTPATIENT)
Dept: OTOLARYNGOLOGY | Facility: CLINIC | Age: 47
End: 2017-12-01

## 2017-12-01 DIAGNOSIS — J33.9 NASAL POLYPOSIS: ICD-10-CM

## 2017-12-01 DIAGNOSIS — J32.4 CHRONIC PANSINUSITIS: ICD-10-CM

## 2017-12-01 RX ORDER — FLUTICASONE PROPIONATE 50 MCG
2 SPRAY, SUSPENSION (ML) NASAL DAILY
Qty: 2 BOTTLE | Refills: 12 | Status: SHIPPED | OUTPATIENT
Start: 2017-12-01 | End: 2018-01-18 | Stop reason: SDUPTHER

## 2017-12-01 RX ORDER — MONTELUKAST SODIUM 10 MG/1
10 TABLET ORAL NIGHTLY
Qty: 30 TABLET | Refills: 12 | Status: SHIPPED | OUTPATIENT
Start: 2017-12-01 | End: 2018-01-18 | Stop reason: SDUPTHER

## 2018-01-04 ENCOUNTER — CLINICAL SUPPORT (OUTPATIENT)
Dept: OTOLARYNGOLOGY | Facility: CLINIC | Age: 48
End: 2018-01-04
Payer: COMMERCIAL

## 2018-01-04 DIAGNOSIS — J30.9 ALLERGIC RHINITIS, UNSPECIFIED CHRONICITY, UNSPECIFIED SEASONALITY, UNSPECIFIED TRIGGER: ICD-10-CM

## 2018-01-04 PROCEDURE — 95117 IMMUNOTHERAPY INJECTIONS: CPT | Mod: S$GLB,,, | Performed by: ORTHOPAEDIC SURGERY

## 2018-01-04 PROCEDURE — 99999 PR PBB SHADOW E&M-EST. PATIENT-LVL I: CPT | Mod: PBBFAC,,,

## 2018-01-04 NOTE — PROGRESS NOTES
Date of treatment initiation: 04/09/2015  Initial SNOT-20 score: 28  Date of last followup visit with referring physician: 06/10/2016  Date next followup visit is due:06/2017  Most recent SNOT-20 score:  0  Date SNOT-20 is due: 09.09.17    No Known Drug Allergies    Ordering Physician: Dr. Kurtz      MAINTENANCE VIALS - MANUFACTURED BY [employer]    Mix #1 - LOT# [id number]  EXP: 12/10/2017  Allergens: trees  Mix #2 - LOT# [id number]  EXP: 12/10/2017  Allergens: weeds and grassed  Mix #3 - LOT# [id number]  EXP: 12/10/2017  Allergens: molds, mites, cockroach, jason, dog, feathers      Administered 0.5 mL of red maintenance vial -  mix #1 and #2 in left arm & mix #3 in right arm subcutaneously at 0930 am manufactured by [employer]. Patient reports that he will wait in the building for 20 minutes after injection, he states that he will contact our office with any questions, concerns, or signs of a reaction.

## 2018-01-09 ENCOUNTER — OFFICE VISIT (OUTPATIENT)
Dept: INTERNAL MEDICINE | Facility: CLINIC | Age: 48
End: 2018-01-09
Payer: COMMERCIAL

## 2018-01-09 VITALS
DIASTOLIC BLOOD PRESSURE: 82 MMHG | OXYGEN SATURATION: 96 % | SYSTOLIC BLOOD PRESSURE: 134 MMHG | BODY MASS INDEX: 30.1 KG/M2 | HEIGHT: 72 IN | TEMPERATURE: 97 F | HEART RATE: 90 BPM | WEIGHT: 222.25 LBS

## 2018-01-09 DIAGNOSIS — J11.1 FLU: Primary | ICD-10-CM

## 2018-01-09 PROCEDURE — 99999 PR PBB SHADOW E&M-EST. PATIENT-LVL III: CPT | Mod: PBBFAC,,, | Performed by: PEDIATRICS

## 2018-01-09 PROCEDURE — 99213 OFFICE O/P EST LOW 20 MIN: CPT | Mod: S$GLB,,, | Performed by: PEDIATRICS

## 2018-01-09 NOTE — PROGRESS NOTES
Subjective:       Patient ID: Irineo Betts is a 47 y.o. male.    Chief Complaint: Cough and Nasal Congestion    3 days of slightly productive cough, congestion, and chills, including this AM. No fever, HA, myalgias, SOB, N/V/D. Did not receive flu vaccine. He is not interested this year due to low success rate.      Cough   Associated symptoms include chills, postnasal drip and rhinorrhea. Pertinent negatives include no eye redness, fever, headaches, rash, sore throat, shortness of breath or wheezing.     Review of Systems   Constitutional: Positive for chills. Negative for activity change, appetite change, fever and unexpected weight change.   HENT: Positive for congestion, postnasal drip and rhinorrhea. Negative for sinus pain, sinus pressure and sore throat.    Eyes: Negative for discharge and redness.   Respiratory: Positive for cough. Negative for shortness of breath and wheezing.    Gastrointestinal: Negative for constipation, diarrhea and vomiting.   Genitourinary: Negative for decreased urine volume and difficulty urinating.   Musculoskeletal: Negative for arthralgias and joint swelling.   Skin: Negative for rash and wound.   Neurological: Negative for syncope and headaches.   Psychiatric/Behavioral: Negative for behavioral problems and sleep disturbance.       Objective:      Physical Exam   Constitutional: He is oriented to person, place, and time. He appears well-developed and well-nourished. No distress.   HENT:   Right Ear: External ear normal.   Left Ear: External ear normal.   Nose: Nose normal.   Mouth/Throat: Oropharynx is clear and moist. No oropharyngeal exudate.   Eyes: Conjunctivae are normal.   Neck: Normal range of motion. Neck supple. No JVD present. No thyromegaly present.   Cardiovascular: Normal rate, regular rhythm and normal heart sounds.    No murmur heard.  Pulmonary/Chest: Effort normal and breath sounds normal. No respiratory distress. He has no wheezes. He has no rales.    Abdominal: Soft. He exhibits no distension and no mass. There is no tenderness. There is no rebound and no guarding.   Musculoskeletal: He exhibits no edema.   Lymphadenopathy:     He has no cervical adenopathy.   Neurological: He is alert and oriented to person, place, and time. He displays normal reflexes. No cranial nerve deficit or sensory deficit. He exhibits normal muscle tone. Coordination normal.   Skin: Capillary refill takes less than 2 seconds. No rash noted.   Psychiatric: He has a normal mood and affect. His behavior is normal. Judgment and thought content normal.       Assessment:       1. Flu        Plan:       Flu    Mucinex DM for congestion, prn tyelnol or advil at OTC dosing. GHydration, infection control, work excuse written. Will not test for flu due to late timing as tamiflu would be too late to work. F/U as needed.

## 2018-01-12 ENCOUNTER — OFFICE VISIT (OUTPATIENT)
Dept: OTOLARYNGOLOGY | Facility: CLINIC | Age: 48
End: 2018-01-12
Payer: COMMERCIAL

## 2018-01-12 VITALS
WEIGHT: 223.56 LBS | DIASTOLIC BLOOD PRESSURE: 87 MMHG | BODY MASS INDEX: 30.28 KG/M2 | HEIGHT: 72 IN | HEART RATE: 79 BPM | TEMPERATURE: 97 F | SYSTOLIC BLOOD PRESSURE: 151 MMHG

## 2018-01-12 DIAGNOSIS — J32.4 CHRONIC PANSINUSITIS: Primary | ICD-10-CM

## 2018-01-12 DIAGNOSIS — B96.89 ACUTE BACTERIAL RHINOSINUSITIS: ICD-10-CM

## 2018-01-12 DIAGNOSIS — J33.9 NASAL POLYPOSIS: ICD-10-CM

## 2018-01-12 DIAGNOSIS — J01.90 ACUTE BACTERIAL RHINOSINUSITIS: ICD-10-CM

## 2018-01-12 PROCEDURE — 99214 OFFICE O/P EST MOD 30 MIN: CPT | Mod: 25,S$GLB,, | Performed by: OTOLARYNGOLOGY

## 2018-01-12 PROCEDURE — 31231 NASAL ENDOSCOPY DX: CPT | Mod: S$GLB,,, | Performed by: OTOLARYNGOLOGY

## 2018-01-12 PROCEDURE — 99999 PR PBB SHADOW E&M-EST. PATIENT-LVL III: CPT | Mod: PBBFAC,,, | Performed by: OTOLARYNGOLOGY

## 2018-01-12 RX ORDER — PREDNISONE 20 MG/1
TABLET ORAL
Qty: 21 TABLET | Refills: 0 | Status: SHIPPED | OUTPATIENT
Start: 2018-01-12 | End: 2018-02-02 | Stop reason: ALTCHOICE

## 2018-01-12 RX ORDER — LEVOFLOXACIN 500 MG/1
500 TABLET, FILM COATED ORAL DAILY
Qty: 14 TABLET | Refills: 0 | Status: SHIPPED | OUTPATIENT
Start: 2018-01-12 | End: 2018-01-26

## 2018-01-12 NOTE — PROGRESS NOTES
Subjective:   Patient: Irineo Betts [id number], :1970   Visit date:2018 12:50 PM    Chief Complaint: Status post sinus surgery    HPI:  Irineo ANGELES is a 47 y.o. male with chronic sinusitis.    Subjective:    Over the past week, he has been coughing and blowing nose quite a lot.  Drainage R>L.  Moderate.  Quality: thick.  No modifying factors.  Associated sx: Cough, fatigue, chills.          Medical Regimen: SCIT (started 2015); Flonase, Singulair    Surgery: 2015   Technical Procedures Used:     Stereotactic computer assisted navigational procedure; cranial, extradural  Bilateral total ethmoidectomy  Bilateral sphenoidotomy  Bilateral frontal sinusotomy  Right maxillary sinusotomy with removal of tissue  Left maxillary sinusotomy    Procedure in Detail/Findings:    Endoscopic Sinonasal Exam Findings at time of Surgery:  - The right side has fabrice polyps obliterating meati and superior 1/3rd nasal cavity, but patent nasal airway  - The left side has fabrice polyps obliterating meati and superior 1/3rd nasal cavity, but patent nasal airway  - Nasal secretions: thick glue-like mucous on the left FRONTAL RECESS  - Nasal septum: no significant deviation and no perforation appreciated   - Inferior turbinate: - normal mucosa without significant hypertrophy - bilaterally  - Middle turbinate: - normal mucosa without significant hypertrophy - bilaterally  - Other findings: - no mass or obstructive lesion -    Final path: benign  OR Culture: Oropharyngeal genet with no predominant isolate      SNOT 20 (total) =   Sino-Nasal Outcome Test (SNOT-20) 3/9/2017 6/10/2016 2015 2015 3/6/2015 2015 2015   Need to blow nose 0 0 0 2 2 3 1   Sneezing 0 0 0 0 1 0 0   Runny nose 0 0 0 0 0 0 0   Cough 0 0 0 0 1 0 3   Post-nasal discharge 0 0 0 1 1 5 1   Thick nasal discharge 0 0 0 0 1 5 1   Ear fullness 0 0 0 0 0 0 0   Dizziness 0 0 0 0 0 0 0   Ear pain 0 0 0 0 0 0 0   Facial pain/pressure 0 0 0 0 0 1  0   Difficulty falling asleep 0 0 0 0 0 0 2   Wake up at night 0 0 1 1 1 3 1   Lack of a good night's sleep 0 0 0 1 1 3 3   Wake up tired 0 0 0 0 1 3 1   Fatigue 0 0 0 0 1 3 0   Reduced productivity 0 0 0 0 0 1 1   Reduced concentration 0 0 0 1 0 1 1   Frustrated/restless/irritable 0 0 0 1 1 0 1   Sad 0 0 0 0 0 0 0   Embarrassed 0 0 0 0 0 0 0   SNOT Total Score 0 0 1 7 11 28 16     (higher score indicating greater rhinosinusitis-related health burden; range 0-100)      Review of Systems:  -     Allergic/Immunologic: has No Known Allergies..  -     Constitutional: Current temp: 96.9 °F (36.1 °C) (Tympanic)    His meds, allergies, medical, surgical, social & family histories were reviewed & updated:  -     He has a current medication list which includes the following prescription(s): aspirin, fluticasone, and montelukast.  -     He  has a past medical history of Benign heart murmur and Nephrolithiasis.   -     He  does not have any pertinent problems on file.   -     He  has a past surgical history that includes Undescended testicle exploration.  -     He  reports that he has never smoked. He has never used smokeless tobacco. He reports that he drinks alcohol. He reports that he does not use drugs.  -     His family history includes Cancer in his maternal grandfather and maternal grandmother; Heart disease in his father.  -     He has No Known Allergies.    Objective:     Physical Exam:  Vitals:    BP (!) 151/87   Pulse 79   Temp 96.9 °F (36.1 °C) (Tympanic)   Ht 6' (1.829 m)   Wt 101.4 kg (223 lb 8.7 oz)   BMI 30.32 kg/m²   Communication:  Able to communicate, no hoarseness.  Head & Face:  Normocephalic, atraumatic, no sinus tenderness.  Eyes:  Extraocular motions intact.  Ears:  Otoscopy of external auditory canals and tympanic membranes was normal, clinical speech reception thresholds grossly intact, no mass/lesion of auricle.  Nose:  No masses/lesions of external nose, nasal mucosa, septum, and turbinates  were within normal limits.  Mouth:  No mass/lesion of lips, teeth, gums, hard/soft palate, tongue, tonsils, or oropharynx.  Neck & Lymphatics:  No cervical lymphadenopathy, no neck mass/crepitus/ asymmetry, trachea is midline, no thyroid enlargement/tenderness/mass.  Neuro/Psych: Alert with normal mood and affect.   Respiration/Chest:  Symmetric expansion during respiration, normal respiratory effort.  Skin:  Warm and intact.    Assessment & Plan:   There are no diagnoses linked to this encounter.      Actively infected.  Levaquin and prednisone.              Patient: Irineo Betts [id number], :1970  Procedure date:2018  Patient's medications, allergies, past medical, surgical, social and family histories were reviewed and updated as appropriate.  Chief Complaint:  Follow-up    HPI:  Irineo ANGELES is a 47 y.o. male with chronic sinusitis.  Procedure: Risks, benefits, and alternatives of the procedure were discussed with the patient, and the patient consented to the nasal endoscopy.  The nasal cavity was sprayed with a topical decongestant and anesthetic (if needed). The endoscope was passed into each nostril and each nasal cavity was visualized.  On each side the nasal cavity, sinuses (if open), turbinates, and septum were examined with the findings described below. At the end of the examination, the scope was removed. The patient tolerated the procedure well with no complications.     Endoscopic Sinonasal Exam Findings:  -     Sinuses examined: bilateral maxillary, bilateral ethmoid anterior and posterior and bilateral sphenoid            The right sinus(es) have marked edema            The left sinus(es) have polyps in the middle meatus; severe edema of the maxillary  -     Nasal secretions: purulent secretions appreciated bilaterally.  -     Nasal septum: no significant deviation and septal perforation size = 1cm cm and location = posterior   -     Inferior turbinate: - normal mucosa without  significant hypertrophy - bilaterally  -     Middle turbinate: turbinate partially resected bilaterally  -     Other findings: - no mass or obstructive lesion -    Assessment & Plan:  - See today's clinic note

## 2018-01-18 DIAGNOSIS — J33.9 NASAL POLYPOSIS: ICD-10-CM

## 2018-01-18 DIAGNOSIS — J32.4 CHRONIC PANSINUSITIS: ICD-10-CM

## 2018-01-19 RX ORDER — MONTELUKAST SODIUM 10 MG/1
10 TABLET ORAL NIGHTLY
Qty: 30 TABLET | Refills: 12 | Status: SHIPPED | OUTPATIENT
Start: 2018-01-19 | End: 2018-02-23 | Stop reason: SDUPTHER

## 2018-01-19 RX ORDER — FLUTICASONE PROPIONATE 50 MCG
2 SPRAY, SUSPENSION (ML) NASAL DAILY
Qty: 2 BOTTLE | Refills: 12 | Status: SHIPPED | OUTPATIENT
Start: 2018-01-19 | End: 2018-02-26 | Stop reason: SDUPTHER

## 2018-01-25 ENCOUNTER — CLINICAL SUPPORT (OUTPATIENT)
Dept: OTOLARYNGOLOGY | Facility: CLINIC | Age: 48
End: 2018-01-25
Payer: COMMERCIAL

## 2018-01-25 DIAGNOSIS — J30.9 ALLERGIC RHINITIS, UNSPECIFIED CHRONICITY, UNSPECIFIED SEASONALITY, UNSPECIFIED TRIGGER: ICD-10-CM

## 2018-01-25 PROCEDURE — 95117 IMMUNOTHERAPY INJECTIONS: CPT | Mod: S$GLB,,, | Performed by: OTOLARYNGOLOGY

## 2018-01-25 PROCEDURE — 99999 PR PBB SHADOW E&M-EST. PATIENT-LVL I: CPT | Mod: PBBFAC,,,

## 2018-01-25 NOTE — PROGRESS NOTES
Date of treatment initiation: 04/09/2015  Initial SNOT-20 score: 28  Date of last followup visit with referring physician: 06/10/2016  Date next followup visit is due:06/2017  Most recent SNOT-20 score:  0  Date SNOT-20 is due: 09.09.17    No Known Drug Allergies    Ordering Physician: Dr. Kurtz      MAINTENANCE VIALS - MANUFACTURED BY [employer]    Mix #1 - LOT# [id number]  EXP: 12/10/2018  Allergens: trees  Mix #2 - LOT# [id number]  EXP: 12/10/2018  Allergens: weeds and grassed  Mix #3 - LOT# [id number]  EXP: 12/10/2018  Allergens: molds, mites, cockroach, jason, dog, feathers      Administered 0.15 mL of red maintenance vial -  mix #1 and #2 in left arm & mix #3 in right arm subcutaneously at 1120 am manufactured by [employer]. Patient reports that he will wait in the building for 20 minutes after injection, he states that he will contact our office with any questions, concerns, or signs of a reaction.

## 2018-02-02 ENCOUNTER — OFFICE VISIT (OUTPATIENT)
Dept: OTOLARYNGOLOGY | Facility: CLINIC | Age: 48
End: 2018-02-02
Payer: COMMERCIAL

## 2018-02-02 VITALS
HEIGHT: 72 IN | BODY MASS INDEX: 30.7 KG/M2 | WEIGHT: 226.63 LBS | SYSTOLIC BLOOD PRESSURE: 155 MMHG | DIASTOLIC BLOOD PRESSURE: 93 MMHG | TEMPERATURE: 97 F | HEART RATE: 82 BPM

## 2018-02-02 DIAGNOSIS — J30.89 CHRONIC NON-SEASONAL ALLERGIC RHINITIS, UNSPECIFIED TRIGGER: ICD-10-CM

## 2018-02-02 DIAGNOSIS — J32.4 CHRONIC PANSINUSITIS: Primary | ICD-10-CM

## 2018-02-02 PROCEDURE — 3008F BODY MASS INDEX DOCD: CPT | Mod: S$GLB,,, | Performed by: OTOLARYNGOLOGY

## 2018-02-02 PROCEDURE — 99999 PR PBB SHADOW E&M-EST. PATIENT-LVL III: CPT | Mod: PBBFAC,,, | Performed by: OTOLARYNGOLOGY

## 2018-02-02 PROCEDURE — 31231 NASAL ENDOSCOPY DX: CPT | Mod: S$GLB,,, | Performed by: OTOLARYNGOLOGY

## 2018-02-02 PROCEDURE — 99214 OFFICE O/P EST MOD 30 MIN: CPT | Mod: 25,S$GLB,, | Performed by: OTOLARYNGOLOGY

## 2018-02-02 NOTE — PROGRESS NOTES
Subjective:   Patient: Irineo Betts [id number], :1970   Visit date:2018 12:50 PM    Chief Complaint: Status post sinus surgery    HPI:  Irineo ANGELES is a 48 y.o. male with chronic sinusitis.    Subjective:    Irineo ANGELES was last here  and reported that over the prior week, he had been coughing and blowing nose quite a lot.  Drainage R>L.  Moderate.  Quality: thick.  No modifying factors.  Associated sx: Cough, fatigue, chills.  Nasal endoscopy demonstrated purulence and edema.  I placed him on levaquin and prednisone.  Today, he reports dramatic improvement starting about 3 days after starting the medications.  No further drainage.  No facial pressure or pain.   Allergy symptoms under good control.  We had tried to d/c singulair in the past but he began having symptoms again.            Medical Regimen: SCIT (started 2015); Flonase, Singulair    Surgery: 2015   Technical Procedures Used:     Stereotactic computer assisted navigational procedure; cranial, extradural  Bilateral total ethmoidectomy  Bilateral sphenoidotomy  Bilateral frontal sinusotomy  Right maxillary sinusotomy with removal of tissue  Left maxillary sinusotomy    Procedure in Detail/Findings:    Endoscopic Sinonasal Exam Findings at time of Surgery:  - The right side has fabrice polyps obliterating meati and superior 1/3rd nasal cavity, but patent nasal airway  - The left side has fabrice polyps obliterating meati and superior 1/3rd nasal cavity, but patent nasal airway  - Nasal secretions: thick glue-like mucous on the left FRONTAL RECESS  - Nasal septum: no significant deviation and no perforation appreciated   - Inferior turbinate: - normal mucosa without significant hypertrophy - bilaterally  - Middle turbinate: - normal mucosa without significant hypertrophy - bilaterally  - Other findings: - no mass or obstructive lesion -    Final path: benign  OR Culture: Oropharyngeal egnet with no predominant isolate      SNOT 20 (total)  =   Sino-Nasal Outcome Test (SNOT-20) 3/9/2017 6/10/2016 12/11/2015 8/6/2015 3/6/2015 2/20/2015 2/11/2015   Need to blow nose 0 0 0 2 2 3 1   Sneezing 0 0 0 0 1 0 0   Runny nose 0 0 0 0 0 0 0   Cough 0 0 0 0 1 0 3   Post-nasal discharge 0 0 0 1 1 5 1   Thick nasal discharge 0 0 0 0 1 5 1   Ear fullness 0 0 0 0 0 0 0   Dizziness 0 0 0 0 0 0 0   Ear pain 0 0 0 0 0 0 0   Facial pain/pressure 0 0 0 0 0 1 0   Difficulty falling asleep 0 0 0 0 0 0 2   Wake up at night 0 0 1 1 1 3 1   Lack of a good night's sleep 0 0 0 1 1 3 3   Wake up tired 0 0 0 0 1 3 1   Fatigue 0 0 0 0 1 3 0   Reduced productivity 0 0 0 0 0 1 1   Reduced concentration 0 0 0 1 0 1 1   Frustrated/restless/irritable 0 0 0 1 1 0 1   Sad 0 0 0 0 0 0 0   Embarrassed 0 0 0 0 0 0 0   SNOT Total Score 0 0 1 7 11 28 16     (higher score indicating greater rhinosinusitis-related health burden; range 0-100)      Review of Systems:  -     Allergic/Immunologic: has No Known Allergies..  -     Constitutional: Current temp: 97.1 °F (36.2 °C) (Tympanic)    His meds, allergies, medical, surgical, social & family histories were reviewed & updated:  -     He has a current medication list which includes the following prescription(s): aspirin, fluticasone, and montelukast.  -     He  has a past medical history of Benign heart murmur and Nephrolithiasis.   -     He  does not have any pertinent problems on file.   -     He  has a past surgical history that includes Undescended testicle exploration.  -     He  reports that he has never smoked. He has never used smokeless tobacco. He reports that he drinks alcohol. He reports that he does not use drugs.  -     His family history includes Cancer in his maternal grandfather and maternal grandmother; Heart disease in his father.  -     He has No Known Allergies.    Objective:     Physical Exam:  Vitals:    BP (!) 155/93   Pulse 82   Temp 97.1 °F (36.2 °C) (Tympanic)   Ht 6' (1.829 m)   Wt 102.8 kg (226 lb 10.1 oz)   BMI  30.74 kg/m²   Communication:  Able to communicate, no hoarseness.  Head & Face:  Normocephalic, atraumatic, no sinus tenderness.  Eyes:  Extraocular motions intact.  Ears:  Otoscopy of external auditory canals and tympanic membranes was normal, clinical speech reception thresholds grossly intact, no mass/lesion of auricle.  Nose:  No masses/lesions of external nose, nasal mucosa, septum, and turbinates were within normal limits.  Mouth:  No mass/lesion of lips, teeth, gums, hard/soft palate, tongue, tonsils, or oropharynx.  Neck & Lymphatics:  No cervical lymphadenopathy, no neck mass/crepitus/ asymmetry, trachea is midline, no thyroid enlargement/tenderness/mass.  Neuro/Psych: Alert with normal mood and affect.   Respiration/Chest:  Symmetric expansion during respiration, normal respiratory effort.  Skin:  Warm and intact.    Assessment & Plan:   Irineo ANGELES was seen today for follow-up.    Diagnoses and all orders for this visit:    Chronic pansinusitis    Chronic non-seasonal allergic rhinitis, unspecified trigger    Dramatic improvement.  Follow up 3 months.                   Patient: Irineo Betts [id number], :1970  Procedure date:2018  Patient's medications, allergies, past medical, surgical, social and family histories were reviewed and updated as appropriate.  Chief Complaint:  Follow-up (3 weeks rtc )    HPI:  Irineo ANGELES is a 48 y.o. male with chronic sinusitis.  Procedure: Risks, benefits, and alternatives of the procedure were discussed with the patient, and the patient consented to the nasal endoscopy.  The nasal cavity was sprayed with a topical decongestant and anesthetic (if needed). The endoscope was passed into each nostril and each nasal cavity was visualized.  On each side the nasal cavity, sinuses (if open), turbinates, and septum were examined with the findings described below. At the end of the examination, the scope was removed. The patient tolerated the procedure well with no  complications.     Endoscopic Sinonasal Exam Findings:  -     Sinuses examined: bilateral maxillary, bilateral ethmoid anterior and posterior and bilateral sphenoid            The right sinus(es) small amount of thick mucus in superior/anterior ethmoids, normal mucosa            The left sinus(es) no secretions and normal mucosa  -     Nasal septum: no significant deviation and septal perforation size = 1cm cm and location = posterior   -     Inferior turbinate: - normal mucosa without significant hypertrophy - bilaterally  -     Middle turbinate: turbinate partially resected bilaterally  -     Other findings: - no mass or obstructive lesion -    Assessment & Plan:  - See today's clinic note

## 2018-02-14 ENCOUNTER — CLINICAL SUPPORT (OUTPATIENT)
Dept: OTOLARYNGOLOGY | Facility: CLINIC | Age: 48
End: 2018-02-14
Payer: COMMERCIAL

## 2018-02-14 DIAGNOSIS — J30.9 ALLERGIC RHINITIS, UNSPECIFIED CHRONICITY, UNSPECIFIED SEASONALITY, UNSPECIFIED TRIGGER: ICD-10-CM

## 2018-02-14 PROCEDURE — 95165 ANTIGEN THERAPY SERVICES: CPT | Mod: S$GLB,,, | Performed by: OTOLARYNGOLOGY

## 2018-02-14 PROCEDURE — 95117 IMMUNOTHERAPY INJECTIONS: CPT | Mod: S$GLB,,, | Performed by: OTOLARYNGOLOGY

## 2018-02-14 PROCEDURE — 99999 PR PBB SHADOW E&M-EST. PATIENT-LVL I: CPT | Mod: PBBFAC,,,

## 2018-02-14 NOTE — PROGRESS NOTES
Date of treatment initiation: 04/09/2015  Initial SNOT-20 score: 28  Date of last followup visit with referring physician: 06/10/2016  Date next followup visit is due:06/2017  Most recent SNOT-20 score:  0  Date SNOT-20 is due: 09.09.17    No Known Drug Allergies    Ordering Physician: Dr. Kurtz      MAINTENANCE VIALS - MANUFACTURED BY [employer]    Mix #1 - LOT# [id number]  EXP: 01/23/19  Allergens: trees  Mix #2 - LOT# [id number]  EXP: 01/23/19  Allergens: weeds and grassed  Mix #3 - LOT# [id number]  EXP: 01/23/19  Allergens: molds, mites, cockroach, jason, dog, feathers      Administered 0.1 mL of red maintenance vial -  mix #1 and #2 in left arm & mix #3 in right arm subcutaneously at 1015 am manufactured by [employer]. Patient reports that he will wait in the building for 20 minutes after injection, he states that he will contact our office with any questions, concerns, or signs of a reaction.

## 2018-02-23 ENCOUNTER — PATIENT MESSAGE (OUTPATIENT)
Dept: INTERNAL MEDICINE | Facility: CLINIC | Age: 48
End: 2018-02-23

## 2018-02-23 DIAGNOSIS — Z00.00 WELL ADULT EXAM: Primary | ICD-10-CM

## 2018-02-23 DIAGNOSIS — J32.4 CHRONIC PANSINUSITIS: ICD-10-CM

## 2018-02-23 DIAGNOSIS — J33.9 NASAL POLYPOSIS: ICD-10-CM

## 2018-02-26 DIAGNOSIS — J32.4 CHRONIC PANSINUSITIS: ICD-10-CM

## 2018-02-26 DIAGNOSIS — J33.9 NASAL POLYPOSIS: ICD-10-CM

## 2018-02-26 RX ORDER — MONTELUKAST SODIUM 10 MG/1
10 TABLET ORAL NIGHTLY
Qty: 30 TABLET | Refills: 12 | Status: SHIPPED | OUTPATIENT
Start: 2018-02-26 | End: 2018-04-02 | Stop reason: SDUPTHER

## 2018-02-26 RX ORDER — FLUTICASONE PROPIONATE 50 MCG
2 SPRAY, SUSPENSION (ML) NASAL DAILY
Qty: 2 BOTTLE | Refills: 12 | Status: SHIPPED | OUTPATIENT
Start: 2018-02-26 | End: 2018-04-02 | Stop reason: SDUPTHER

## 2018-03-13 ENCOUNTER — CLINICAL SUPPORT (OUTPATIENT)
Dept: OTOLARYNGOLOGY | Facility: CLINIC | Age: 48
End: 2018-03-13
Payer: COMMERCIAL

## 2018-03-13 DIAGNOSIS — J30.9 ALLERGIC RHINITIS, UNSPECIFIED CHRONICITY, UNSPECIFIED SEASONALITY, UNSPECIFIED TRIGGER: ICD-10-CM

## 2018-03-13 PROCEDURE — 99999 PR PBB SHADOW E&M-EST. PATIENT-LVL I: CPT | Mod: PBBFAC,,,

## 2018-03-13 PROCEDURE — 95117 IMMUNOTHERAPY INJECTIONS: CPT | Mod: S$GLB,,, | Performed by: OTOLARYNGOLOGY

## 2018-03-14 NOTE — PROGRESS NOTES
Date of treatment initiation: 04/09/2015  Initial SNOT-20 score: 28  Date of last followup visit with referring physician: 06/10/2016  Date next followup visit is due:06/2017  Most recent SNOT-20 score:  0  Date SNOT-20 is due: 09.09.17    No Known Drug Allergies    Ordering Physician: Dr. Kurtz      MAINTENANCE VIALS - MANUFACTURED BY [employer]    Mix #1 - LOT# [id number]  EXP: 01/23/19  Allergens: trees  Mix #2 - LOT# [id number]  EXP: 01/23/19  Allergens: weeds and grassed  Mix #3 - LOT# [id number]  EXP: 01/23/19  Allergens: molds, mites, cockroach, jason, dog, feathers      Administered 0.1 mL of red maintenance vial -  mix #1 and #2 in left arm & mix #3 in right arm subcutaneously at 1420 pm manufactured by [employer]. Patient reports that he will wait in the building for 20 minutes after injection, he states that he will contact our office with any questions, concerns, or signs of a reaction.

## 2018-03-26 ENCOUNTER — PATIENT OUTREACH (OUTPATIENT)
Dept: ADMINISTRATIVE | Facility: HOSPITAL | Age: 48
End: 2018-03-26

## 2018-04-02 ENCOUNTER — LAB VISIT (OUTPATIENT)
Dept: LAB | Facility: HOSPITAL | Age: 48
End: 2018-04-02
Attending: PEDIATRICS
Payer: COMMERCIAL

## 2018-04-02 DIAGNOSIS — Z00.00 WELL ADULT EXAM: ICD-10-CM

## 2018-04-02 DIAGNOSIS — J33.9 NASAL POLYPOSIS: ICD-10-CM

## 2018-04-02 DIAGNOSIS — J32.4 CHRONIC PANSINUSITIS: ICD-10-CM

## 2018-04-02 LAB
ANION GAP SERPL CALC-SCNC: 8 MMOL/L
BASOPHILS # BLD AUTO: 0.06 K/UL
BASOPHILS NFR BLD: 1 %
BUN SERPL-MCNC: 15 MG/DL
CALCIUM SERPL-MCNC: 9.9 MG/DL
CHLORIDE SERPL-SCNC: 104 MMOL/L
CHOLEST SERPL-MCNC: 210 MG/DL
CHOLEST/HDLC SERPL: 4.4 {RATIO}
CO2 SERPL-SCNC: 28 MMOL/L
CREAT SERPL-MCNC: 0.8 MG/DL
DIFFERENTIAL METHOD: ABNORMAL
EOSINOPHIL # BLD AUTO: 0.7 K/UL
EOSINOPHIL NFR BLD: 11.3 %
ERYTHROCYTE [DISTWIDTH] IN BLOOD BY AUTOMATED COUNT: 12.3 %
EST. GFR  (AFRICAN AMERICAN): >60 ML/MIN/1.73 M^2
EST. GFR  (NON AFRICAN AMERICAN): >60 ML/MIN/1.73 M^2
GLUCOSE SERPL-MCNC: 97 MG/DL
HCT VFR BLD AUTO: 46.3 %
HDLC SERPL-MCNC: 48 MG/DL
HDLC SERPL: 22.9 %
HGB BLD-MCNC: 16.2 G/DL
IMM GRANULOCYTES # BLD AUTO: 0.02 K/UL
IMM GRANULOCYTES NFR BLD AUTO: 0.3 %
LDLC SERPL CALC-MCNC: 133.2 MG/DL
LYMPHOCYTES # BLD AUTO: 1.6 K/UL
LYMPHOCYTES NFR BLD: 27.4 %
MCH RBC QN AUTO: 32 PG
MCHC RBC AUTO-ENTMCNC: 35 G/DL
MCV RBC AUTO: 91 FL
MONOCYTES # BLD AUTO: 0.5 K/UL
MONOCYTES NFR BLD: 8.5 %
NEUTROPHILS # BLD AUTO: 3 K/UL
NEUTROPHILS NFR BLD: 51.5 %
NONHDLC SERPL-MCNC: 162 MG/DL
NRBC BLD-RTO: 0 /100 WBC
PLATELET # BLD AUTO: 246 K/UL
PMV BLD AUTO: 9.7 FL
POTASSIUM SERPL-SCNC: 4.2 MMOL/L
RBC # BLD AUTO: 5.07 M/UL
SODIUM SERPL-SCNC: 140 MMOL/L
TRIGL SERPL-MCNC: 144 MG/DL
WBC # BLD AUTO: 5.76 K/UL

## 2018-04-02 PROCEDURE — 80061 LIPID PANEL: CPT

## 2018-04-02 PROCEDURE — 80048 BASIC METABOLIC PNL TOTAL CA: CPT

## 2018-04-02 PROCEDURE — 36415 COLL VENOUS BLD VENIPUNCTURE: CPT | Mod: PO

## 2018-04-02 PROCEDURE — 85025 COMPLETE CBC W/AUTO DIFF WBC: CPT

## 2018-04-02 RX ORDER — FLUTICASONE PROPIONATE 50 MCG
2 SPRAY, SUSPENSION (ML) NASAL DAILY
Qty: 2 BOTTLE | Refills: 12 | Status: SHIPPED | OUTPATIENT
Start: 2018-04-02 | End: 2018-05-03 | Stop reason: SDUPTHER

## 2018-04-02 RX ORDER — MONTELUKAST SODIUM 10 MG/1
10 TABLET ORAL NIGHTLY
Qty: 30 TABLET | Refills: 12 | Status: SHIPPED | OUTPATIENT
Start: 2018-04-02 | End: 2018-05-03 | Stop reason: SDUPTHER

## 2018-04-04 ENCOUNTER — PATIENT MESSAGE (OUTPATIENT)
Dept: INTERNAL MEDICINE | Facility: CLINIC | Age: 48
End: 2018-04-04

## 2018-04-10 ENCOUNTER — OFFICE VISIT (OUTPATIENT)
Dept: INTERNAL MEDICINE | Facility: CLINIC | Age: 48
End: 2018-04-10
Payer: COMMERCIAL

## 2018-04-10 VITALS
HEIGHT: 72 IN | TEMPERATURE: 98 F | DIASTOLIC BLOOD PRESSURE: 98 MMHG | WEIGHT: 223.31 LBS | SYSTOLIC BLOOD PRESSURE: 130 MMHG | OXYGEN SATURATION: 99 % | HEART RATE: 81 BPM | BODY MASS INDEX: 30.25 KG/M2

## 2018-04-10 DIAGNOSIS — J30.89 CHRONIC NONSEASONAL ALLERGIC RHINITIS DUE TO OTHER ALLERGEN: ICD-10-CM

## 2018-04-10 DIAGNOSIS — Z00.00 WELL ADULT EXAM: Primary | ICD-10-CM

## 2018-04-10 DIAGNOSIS — F51.04 PSYCHOPHYSIOLOGICAL INSOMNIA: ICD-10-CM

## 2018-04-10 DIAGNOSIS — E78.00 PURE HYPERCHOLESTEROLEMIA: ICD-10-CM

## 2018-04-10 DIAGNOSIS — F41.9 ANXIETY: ICD-10-CM

## 2018-04-10 DIAGNOSIS — J32.4 CHRONIC PANSINUSITIS: ICD-10-CM

## 2018-04-10 PROCEDURE — 99999 PR PBB SHADOW E&M-EST. PATIENT-LVL III: CPT | Mod: PBBFAC,,, | Performed by: PEDIATRICS

## 2018-04-10 PROCEDURE — 99396 PREV VISIT EST AGE 40-64: CPT | Mod: S$GLB,,, | Performed by: PEDIATRICS

## 2018-04-10 RX ORDER — TRAZODONE HYDROCHLORIDE 100 MG/1
100 TABLET ORAL NIGHTLY
Qty: 30 TABLET | Refills: 11 | Status: SHIPPED | OUTPATIENT
Start: 2018-04-10 | End: 2018-05-29 | Stop reason: SDUPTHER

## 2018-04-10 NOTE — PROGRESS NOTES
Subjective:       Patient ID: Irineo Betts is a 48 y.o. male.    Chief Complaint: Follow-up    PMH/PSH/SH/FH reviewed. New change is daughter's death in MVA. Greiving, mild depression, but mainly sleeping poorly both falling asleep and maintaining, also nightmares. No SI/HI.      Review of Systems   Constitutional: Negative for fever and unexpected weight change.   HENT: Negative for congestion and rhinorrhea.    Eyes: Negative for discharge and redness.   Respiratory: Negative for cough and wheezing.    Cardiovascular: Negative for chest pain and leg swelling.   Gastrointestinal: Negative for constipation, diarrhea and vomiting.   Genitourinary: Negative for decreased urine volume and difficulty urinating.   Musculoskeletal: Negative for arthralgias and joint swelling.   Skin: Negative for rash and wound.   Neurological: Negative for syncope and headaches.   Psychiatric/Behavioral: Positive for dysphoric mood and sleep disturbance. Negative for behavioral problems, self-injury and suicidal ideas. The patient is nervous/anxious.        Objective:      Physical Exam   Constitutional: He is oriented to person, place, and time. He appears well-developed and well-nourished. No distress.   HENT:   Head: Normocephalic and atraumatic.   Right Ear: Tympanic membrane and external ear normal.   Left Ear: Tympanic membrane and external ear normal.   Nose: Nose normal.   Mouth/Throat: Uvula is midline, oropharynx is clear and moist and mucous membranes are normal. Normal dentition.   Eyes: Conjunctivae, EOM and lids are normal. Pupils are equal, round, and reactive to light.   Neck: Trachea normal and normal range of motion. Neck supple. No JVD present. No thyromegaly present.   Cardiovascular: Normal rate, regular rhythm, S1 normal, S2 normal, normal heart sounds and normal pulses.  Exam reveals no gallop and no friction rub.    No murmur heard.  Pulmonary/Chest: Effort normal and breath sounds normal. He has no  wheezes. He has no rales.   Abdominal: Soft. Normal appearance and bowel sounds are normal. He exhibits no mass. There is no hepatosplenomegaly. There is no tenderness. There is no rebound and no guarding.   Musculoskeletal: Normal range of motion.   Lymphadenopathy:     He has no cervical adenopathy.   Neurological: He is alert and oriented to person, place, and time. He has normal strength. No cranial nerve deficit. Coordination and gait normal.   Skin: Skin is warm and intact. No rash noted.   Psychiatric: He has a normal mood and affect. His speech is normal and behavior is normal. Judgment and thought content normal.       Assessment:       1. Well adult exam    2. Pure hypercholesterolemia    3. Chronic pansinusitis    4. Chronic nonseasonal allergic rhinitis due to other allergen    5. Anxiety    6. Psychophysiological insomnia        Plan:       Well adult exam  -     Lipid panel; Future; Expected date: 04/10/2018    Pure hypercholesterolemia    Chronic pansinusitis    Chronic nonseasonal allergic rhinitis due to other allergen    Anxiety  -     traZODone (DESYREL) 100 MG tablet; Take 1 tablet (100 mg total) by mouth every evening.  Dispense: 30 tablet; Refill: 11    Psychophysiological insomnia  -     traZODone (DESYREL) 100 MG tablet; Take 1 tablet (100 mg total) by mouth every evening.  Dispense: 30 tablet; Refill: 11    Labs reviewed. D&E for mild dyslipidema(ASCVD risk was 2.7%).  issues reviewed with patient. Start trazodone, counseling should he wish. F/u 6 weeks.

## 2018-04-23 ENCOUNTER — OFFICE VISIT (OUTPATIENT)
Dept: OTOLARYNGOLOGY | Facility: CLINIC | Age: 48
End: 2018-04-23
Payer: COMMERCIAL

## 2018-04-23 VITALS
BODY MASS INDEX: 31.18 KG/M2 | TEMPERATURE: 98 F | WEIGHT: 230.19 LBS | SYSTOLIC BLOOD PRESSURE: 155 MMHG | DIASTOLIC BLOOD PRESSURE: 93 MMHG | HEIGHT: 72 IN | HEART RATE: 80 BPM

## 2018-04-23 DIAGNOSIS — J32.4 CHRONIC PANSINUSITIS: Primary | ICD-10-CM

## 2018-04-23 DIAGNOSIS — J33.9 NASAL POLYPOSIS: ICD-10-CM

## 2018-04-23 PROCEDURE — 99213 OFFICE O/P EST LOW 20 MIN: CPT | Mod: 25,S$GLB,, | Performed by: OTOLARYNGOLOGY

## 2018-04-23 PROCEDURE — 99999 PR PBB SHADOW E&M-EST. PATIENT-LVL III: CPT | Mod: PBBFAC,,, | Performed by: OTOLARYNGOLOGY

## 2018-04-23 PROCEDURE — 31231 NASAL ENDOSCOPY DX: CPT | Mod: S$GLB,,, | Performed by: OTOLARYNGOLOGY

## 2018-04-23 NOTE — PROGRESS NOTES
Subjective:   Patient: Irineo Betts [id number], :1970   Visit date:2018 12:50 PM    Chief Complaint: Status post sinus surgery    HPI:  Irineo ANGELES is a 48 y.o. male with chronic sinusitis.     Subjective:      Mr. Betts was last here on 2018. Since his last OV, he is doing very well and denies any sinus congestion today. He continues to use Flonase and Singulair daily with significant improvement in congestion and drainage. He has been receiving allergy shots every other week. Pt is also using Sinugator daily which he reports significantly has helped with nasal drainage. Pt denies facial pain or pressure.     Medical Regimen: SCIT (started 2015); Flonase, Singulair    Surgery: 2015   Technical Procedures Used:     Stereotactic computer assisted navigational procedure; cranial, extradural  Bilateral total ethmoidectomy  Bilateral sphenoidotomy  Bilateral frontal sinusotomy  Right maxillary sinusotomy with removal of tissue  Left maxillary sinusotomy    Procedure in Detail/Findings:    Endoscopic Sinonasal Exam Findings at time of Surgery:  - The right side has fabrice polyps obliterating meati and superior 1/3rd nasal cavity, but patent nasal airway  - The left side has fabrice polyps obliterating meati and superior 1/3rd nasal cavity, but patent nasal airway  - Nasal secretions: thick glue-like mucous on the left FRONTAL RECESS  - Nasal septum: no significant deviation and no perforation appreciated   - Inferior turbinate: - normal mucosa without significant hypertrophy - bilaterally  - Middle turbinate: - normal mucosa without significant hypertrophy - bilaterally  - Other findings: - no mass or obstructive lesion -    Final path: benign  OR Culture: Oropharyngeal genet with no predominant isolate      SNOT 20 (total) =   Sino-Nasal Outcome Test (SNOT-20) 3/9/2017 6/10/2016 2015 2015 3/6/2015 2015 2015   Need to blow nose 0 0 0 2 2 3 1   Sneezing 0 0 0 0 1 0 0    Runny nose 0 0 0 0 0 0 0   Cough 0 0 0 0 1 0 3   Post-nasal discharge 0 0 0 1 1 5 1   Thick nasal discharge 0 0 0 0 1 5 1   Ear fullness 0 0 0 0 0 0 0   Dizziness 0 0 0 0 0 0 0   Ear pain 0 0 0 0 0 0 0   Facial pain/pressure 0 0 0 0 0 1 0   Difficulty falling asleep 0 0 0 0 0 0 2   Wake up at night 0 0 1 1 1 3 1   Lack of a good night's sleep 0 0 0 1 1 3 3   Wake up tired 0 0 0 0 1 3 1   Fatigue 0 0 0 0 1 3 0   Reduced productivity 0 0 0 0 0 1 1   Reduced concentration 0 0 0 1 0 1 1   Frustrated/restless/irritable 0 0 0 1 1 0 1   Sad 0 0 0 0 0 0 0   Embarrassed 0 0 0 0 0 0 0   SNOT Total Score 0 0 1 7 11 28 16     (higher score indicating greater rhinosinusitis-related health burden; range 0-100)      Review of Systems:  -     Allergic/Immunologic: has No Known Allergies..  -     Constitutional: Current temp: 97.6 °F (36.4 °C)    His meds, allergies, medical, surgical, social & family histories were reviewed & updated:  -     He has a current medication list which includes the following prescription(s): fluticasone, montelukast, trazodone, and aspirin.  -     He  has a past medical history of Benign heart murmur and Nephrolithiasis.   -     He  does not have any pertinent problems on file.   -     He  has a past surgical history that includes Undescended testicle exploration.  -     He  reports that he has never smoked. He has never used smokeless tobacco. He reports that he drinks alcohol. He reports that he does not use drugs.  -     His family history includes Cancer in his maternal grandfather and maternal grandmother; Heart disease in his father.  -     He has No Known Allergies.    Objective:     Physical Exam:  Vitals:    BP (!) 155/93 (BP Location: Left arm, Patient Position: Sitting)   Pulse 80   Temp 97.6 °F (36.4 °C)   Ht 6' (1.829 m)   Wt 104.4 kg (230 lb 2.6 oz)   BMI 31.22 kg/m²   Communication:  Able to communicate, no hoarseness.  Head & Face:  Normocephalic, atraumatic, no sinus  tenderness.  Eyes:  Extraocular motions intact.  Ears:  Otoscopy of external auditory canals and tympanic membranes was normal, clinical speech reception thresholds grossly intact, no mass/lesion of auricle.  Nose:  No masses/lesions of external nose, nasal mucosa, septum, and turbinates were within normal limits.  Mouth:  No mass/lesion of lips, teeth, gums, hard/soft palate, tongue, tonsils, or oropharynx.  Neck & Lymphatics:  No cervical lymphadenopathy, no neck mass/crepitus/ asymmetry, trachea is midline, no thyroid enlargement/tenderness/mass.  Neuro/Psych: Alert with normal mood and affect.   Respiration/Chest:  Symmetric expansion during respiration, normal respiratory effort.  Skin:  Warm and intact.    Assessment & Plan:   Irineo ANGELES was seen today for follow-up.    Diagnoses and all orders for this visit:    Chronic pansinusitis    Nasal polyposis     Pt is doing very well, we will continue with Singulair and Flonase. Also, monthly allergy injections.   F/u within 1 yr.        Patient: Irineo Betts [id number], :1970  Procedure date:2018  Patient's medications, allergies, past medical, surgical, social and family histories were reviewed and updated as appropriate.  Chief Complaint:  Follow-up (PCP Dr. Betty Alberts, Follow up pt states no major issues.)    HPI:  Irineo ANGELES is a 48 y.o. male with chronic sinusitis.  Procedure: Risks, benefits, and alternatives of the procedure were discussed with the patient, and the patient consented to the nasal endoscopy.  The nasal cavity was sprayed with a topical decongestant and anesthetic (if needed). The endoscope was passed into each nostril and each nasal cavity was visualized.  On each side the nasal cavity, sinuses (if open), turbinates, and septum were examined with the findings described below. At the end of the examination, the scope was removed. The patient tolerated the procedure well with no complications.     Endoscopic Sinonasal Exam  Findings:  -     Sinuses examined: bilateral maxillary, bilateral ethmoid anterior and posterior and bilateral sphenoid            The right sinus(es) no secretions and normal mucosa            The left sinus(es) no secretions and normal mucosa  -     Nasal septum: no significant deviation and septal perforation size = 1cm cm and location = posterior   -     Inferior turbinate: - normal mucosa without significant hypertrophy - bilaterally  -     Middle turbinate: turbinate partially resected bilaterally  -     Other findings: - no mass or obstructive lesion -    Assessment & Plan:  - See today's clinic note

## 2018-05-03 DIAGNOSIS — J32.4 CHRONIC PANSINUSITIS: ICD-10-CM

## 2018-05-03 DIAGNOSIS — J33.9 NASAL POLYPOSIS: ICD-10-CM

## 2018-05-04 RX ORDER — MONTELUKAST SODIUM 10 MG/1
10 TABLET ORAL NIGHTLY
Qty: 30 TABLET | Refills: 12 | Status: SHIPPED | OUTPATIENT
Start: 2018-05-04 | End: 2018-06-04 | Stop reason: SDUPTHER

## 2018-05-04 RX ORDER — FLUTICASONE PROPIONATE 50 MCG
2 SPRAY, SUSPENSION (ML) NASAL DAILY
Qty: 2 BOTTLE | Refills: 12 | Status: SHIPPED | OUTPATIENT
Start: 2018-05-04 | End: 2018-06-04 | Stop reason: SDUPTHER

## 2018-05-09 ENCOUNTER — PATIENT OUTREACH (OUTPATIENT)
Dept: ADMINISTRATIVE | Facility: HOSPITAL | Age: 48
End: 2018-05-09

## 2018-05-11 ENCOUNTER — OFFICE VISIT (OUTPATIENT)
Dept: INTERNAL MEDICINE | Facility: CLINIC | Age: 48
End: 2018-05-11
Payer: COMMERCIAL

## 2018-05-11 ENCOUNTER — PATIENT MESSAGE (OUTPATIENT)
Dept: ADMINISTRATIVE | Facility: OTHER | Age: 48
End: 2018-05-11

## 2018-05-11 VITALS
HEART RATE: 78 BPM | BODY MASS INDEX: 30.52 KG/M2 | OXYGEN SATURATION: 99 % | WEIGHT: 225.31 LBS | TEMPERATURE: 98 F | HEIGHT: 72 IN | SYSTOLIC BLOOD PRESSURE: 142 MMHG | DIASTOLIC BLOOD PRESSURE: 84 MMHG

## 2018-05-11 DIAGNOSIS — F43.21 GRIEF AT LOSS OF CHILD: Primary | ICD-10-CM

## 2018-05-11 DIAGNOSIS — I10 ESSENTIAL HYPERTENSION: ICD-10-CM

## 2018-05-11 DIAGNOSIS — Z63.4 GRIEF AT LOSS OF CHILD: Primary | ICD-10-CM

## 2018-05-11 PROCEDURE — 3077F SYST BP >= 140 MM HG: CPT | Mod: CPTII,S$GLB,, | Performed by: PEDIATRICS

## 2018-05-11 PROCEDURE — 99999 PR PBB SHADOW E&M-EST. PATIENT-LVL III: CPT | Mod: PBBFAC,,, | Performed by: PEDIATRICS

## 2018-05-11 PROCEDURE — 99214 OFFICE O/P EST MOD 30 MIN: CPT | Mod: S$GLB,,, | Performed by: PEDIATRICS

## 2018-05-11 PROCEDURE — 3079F DIAST BP 80-89 MM HG: CPT | Mod: CPTII,S$GLB,, | Performed by: PEDIATRICS

## 2018-05-11 PROCEDURE — 3008F BODY MASS INDEX DOCD: CPT | Mod: CPTII,S$GLB,, | Performed by: PEDIATRICS

## 2018-05-11 RX ORDER — LISINOPRIL 10 MG/1
10 TABLET ORAL DAILY
Qty: 90 TABLET | Refills: 3 | Status: SHIPPED | OUTPATIENT
Start: 2018-05-11 | End: 2018-06-13

## 2018-05-11 SDOH — SOCIAL DETERMINANTS OF HEALTH (SDOH): DISSAPEARANCE AND DEATH OF FAMILY MEMBER: Z63.4

## 2018-05-11 NOTE — PROGRESS NOTES
Subjective:       Patient ID: Irineo Betts is a 48 y.o. male.    Chief Complaint: Follow-up    Trazadone is helping at 50 mg, sleeping better, less anxiety, less nightmares. Feels he could do better. B/P elevated 50% of time.      Review of Systems   Constitutional: Negative for fever and unexpected weight change.   HENT: Negative for congestion and rhinorrhea.    Eyes: Negative for discharge and redness.   Respiratory: Negative for cough and wheezing.    Gastrointestinal: Negative for constipation, diarrhea and vomiting.   Genitourinary: Negative for decreased urine volume and difficulty urinating.   Musculoskeletal: Negative for arthralgias and joint swelling.   Skin: Negative for rash and wound.   Neurological: Negative for syncope and headaches.   Psychiatric/Behavioral: Positive for dysphoric mood and sleep disturbance. Negative for behavioral problems, self-injury and suicidal ideas. The patient is nervous/anxious.        Objective:      Physical Exam   Constitutional: He is oriented to person, place, and time. He appears well-developed and well-nourished. No distress.   Neck: No JVD present.   Cardiovascular: Normal rate, regular rhythm and normal heart sounds.  Exam reveals no friction rub.    No murmur heard.  Pulmonary/Chest: Effort normal and breath sounds normal. No respiratory distress. He has no wheezes. He has no rales.   Abdominal: Soft. He exhibits no distension and no mass. There is no tenderness. There is no guarding.   Musculoskeletal: He exhibits no edema.   Lymphadenopathy:     He has no cervical adenopathy.   Neurological: He is alert and oriented to person, place, and time. No cranial nerve deficit. Coordination normal.   Skin: Capillary refill takes less than 2 seconds.   Psychiatric: He has a normal mood and affect. His behavior is normal. Judgment and thought content normal.       Assessment:       1. Grief at loss of child    2. Essential hypertension        Plan:       Grief at  loss of child    Essential hypertension  -     Hypertension Digital Medicine (HDMP) Enrollment Order  -     Hypertension Digital Medicine ([facility]): Assign Onboarding Questionnaires  -     lisinopril 10 MG tablet; Take 1 tablet (10 mg total) by mouth once daily.  Dispense: 90 tablet; Refill: 3    Increase trazodone 100mg at night. D&E, low salt. STart lsinopril 10 mg, SE d/w pt and wife. F/U 4 weeks.

## 2018-05-12 ENCOUNTER — PATIENT MESSAGE (OUTPATIENT)
Dept: ADMINISTRATIVE | Facility: OTHER | Age: 48
End: 2018-05-12

## 2018-05-18 ENCOUNTER — PATIENT MESSAGE (OUTPATIENT)
Dept: ADMINISTRATIVE | Facility: OTHER | Age: 48
End: 2018-05-18

## 2018-05-29 DIAGNOSIS — F51.04 PSYCHOPHYSIOLOGICAL INSOMNIA: ICD-10-CM

## 2018-05-29 DIAGNOSIS — F41.9 ANXIETY: ICD-10-CM

## 2018-05-29 RX ORDER — TRAZODONE HYDROCHLORIDE 100 MG/1
100 TABLET ORAL NIGHTLY
Qty: 30 TABLET | Refills: 11 | Status: SHIPPED | OUTPATIENT
Start: 2018-05-29 | End: 2019-06-04

## 2018-05-31 ENCOUNTER — PATIENT OUTREACH (OUTPATIENT)
Dept: OTHER | Facility: OTHER | Age: 48
End: 2018-05-31

## 2018-05-31 NOTE — PROGRESS NOTES
Last 5 Patient Entered Readings                                      Current 30 Day Average: 132/80     Recent Readings 5/29/2018 5/29/2018 5/27/2018 5/26/2018 5/24/2018    SBP (mmHg) 128 152 142 143 124    DBP (mmHg) 71 90 86 89 79    Pulse 79 82 90 83 71          Mr. Irineo Betts is a 48 y.o. male who is newly enrolled in the Digital Medicine Hypertension Clinic.     The following information was reviewed/updated:  Preferred pharmacy   [facility]  [address] [facility] [address] [place] [address]  Phone: [phone] Fax: [phone]      Patient prefers a 90 days supply    HYPERTENSION    Explained that we expect patient to obtain several blood pressures per week at random times of day.   Our goal is to get  BP to consistently below 130/80mmHg and make the process convenient so patient can avoid extra trips to the office. Getting your blood pressure below 130/80mmHg (definition of control) will reduce your risk for heart attack, kidney failure, stroke and death (as well as kidney failure, eye disease, & dementia).     Patient is not meeting the goal already. Current BP average 132/80 mmHg.  When asked what the patient thinks is causing BP to be elevated, he states: stress    Discussed appropriate BP measuring technique:  Before taking your blood pressure, find a quiet place. You will need to listen for your heartbeat.  Roll up the sleeve on your left arm or remove any tight-sleeved clothing, if needed. (It's best to take your blood pressure from your left arm if you are right-handed.You can use the other arm if you have been told by your health care provider to do so.)  Rest in a chair next to a table for 5 to 10 minutes. (Your left arm should rest comfortably at heart level.)  Sit up straight with your back against the chair, legs uncrossed and on the ground.  Rest your forearm on the table with the palm of your hand facing up.  You should not talk, read the newspaper, or watch television during this  process.    Last 5 Patient Entered Readings                                      Current 30 Day Average: 132/80     Recent Readings 5/29/2018 5/29/2018 5/27/2018 5/26/2018 5/24/2018    SBP (mmHg) 128 152 142 143 124    DBP (mmHg) 71 90 86 89 79    Pulse 79 82 90 83 71          Diet: Recommended DASH diet (<2,000mg of sodium/day) Patient states that he and his wife will cook at home.  Patient reports that he does not eat a lot of junk food.  Patient claims that they will go out to eat occasionally on the weekend.    Exercise: Patient reports no exercise regimen at this time, but states that there is a treadmill in the house.  Patient states that he will try to start using the treadmill and walk around the neighborhood.  Encouraged patient to walk with his wife.    Alcohol/Tobacco: Patient reports that he drink one beer occasionally during the week days.  Patient reports that he will drink 6-12 beers on the weekend.  Patient reports that he does not drink hard liquor.  Patient reports no tobacco use, but has a cigar occasionally.    Medications: Patient states the he is taking his medication.  Expressed to patient to take medication in the morning and wait at least 45 minutes before taking blood pressure reading.    Other Goals: Patient states that he would like to reduce stress levels.  When asked if patient would like to receive information about the DASH diet, patient states that he would like to try it.    Email: [email or web address]    Instructed patient not to allow anyone else to use phone and BP cuff or glucometer.   I'm not available for emergencies. Patient will call [facility] on-call ([phone] or [phone]) or [phone] if needed.     Patient and I agreed that he will continue to monitor blood pressure, blood glucose, and sodium intake and continue to remain adherent to medications.     I will plan to follow-up with the patient in 2 weeks.   Emailed patient link to [facility] webpage and my  contact information in case he has any questions.

## 2018-05-31 NOTE — LETTER
Chelsi Lloyd, PharmD  [address] [facility] [address]     Dear Irineo Betts,    Welcome to the [facility]!         My name is Chelsi Lloyd PharmD and I am your dedicated Digital Medicine clinician.  As an expert in medication management, I will help ensure that the medications you are taking continue to provide you with the intended benefits.      I am Parker Boswell and I will be your health  for the duration of the program.  My  job is to help you identify lifestyle changes to improve your blood pressure control.  We will talk about nutrition, exercise, and other ways that you may be able to adjust your current habits to better your health. Together, we will work to improve your overall health and encourage you to meet your goals for a healthier lifestyle.    What we expect from YOU:    You will need to take blood pressure readings multiple times a week and no less than one reading per week.   It is important that you take your measurements at different times during the day, when possible.     What you should expect from your Digital Medicine Care Team:   We will provide you with education about high blood pressure, including lifestyle changes that could help you to control your blood pressure.   We will review your weekly readings and provide you with monthly blood pressure progress reports after you have been in the program for more than 30 days.   We will send monthly progress reports on your blood pressure control to your physician so they can follow along with your progress as well.    You will be able to reach me by phone at [phone] or through your [facility] account by clicking my name under Care Team on the right side of the home screen.    I look forward to working with you to achieve your blood pressure goals!    Sincerely,    Chelsi Lloyd PharmD  Your personal clinician    Please visit  [email or web address] to learn more about high blood pressure and what you can do lower your blood pressure.                                                                                           Irineo Betts  [address]  [place] [phone]

## 2018-06-04 ENCOUNTER — PATIENT MESSAGE (OUTPATIENT)
Dept: INTERNAL MEDICINE | Facility: CLINIC | Age: 48
End: 2018-06-04

## 2018-06-04 DIAGNOSIS — J32.4 CHRONIC PANSINUSITIS: ICD-10-CM

## 2018-06-04 DIAGNOSIS — J33.9 NASAL POLYPOSIS: ICD-10-CM

## 2018-06-04 RX ORDER — FLUTICASONE PROPIONATE 50 MCG
2 SPRAY, SUSPENSION (ML) NASAL DAILY
Qty: 32 G | Refills: 12 | Status: SHIPPED | OUTPATIENT
Start: 2018-06-04 | End: 2019-06-14 | Stop reason: SDUPTHER

## 2018-06-04 RX ORDER — MONTELUKAST SODIUM 10 MG/1
10 TABLET ORAL NIGHTLY
Qty: 30 TABLET | Refills: 12 | Status: SHIPPED | OUTPATIENT
Start: 2018-06-04 | End: 2019-06-24 | Stop reason: SDUPTHER

## 2018-06-08 ENCOUNTER — PATIENT OUTREACH (OUTPATIENT)
Dept: OTHER | Facility: OTHER | Age: 48
End: 2018-06-08

## 2018-06-08 NOTE — PROGRESS NOTES
Last 5 Patient Entered Readings                                      Current 30 Day Average: 134/83     Recent Readings 6/8/2018 6/7/2018 6/7/2018 6/7/2018 6/6/2018    SBP (mmHg) 140 131 149 146 145    DBP (mmHg) 90 82 80 96 89    Pulse 84 88 84 83 87          Patient's BP average is above goal of <130/80.     Patient denies s/s of hypotension (lightheadedness, dizziness, nausea, fatigue) associated with low readings. Instructed patient to inform me if this occurs, patient confirms understanding.      Patient denies s/s of hypertension (SOB, CP, severe headaches, changes in vision) associated with high readings. Instructed patient to go to the ED if BP > 180/110 and accompanied by hypertensive s/s, patient confirms understanding.    Will continue to monitor regularly. Will follow up in 2-3 weeks, sooner if BP begins to trend upward or downward.    Patient has my contact information and knows to call with any concerns or clinical changes.     Current HTN regimen:  Hypertension Medications             lisinopril 10 MG tablet Take 1 tablet (10 mg total) by mouth once daily.        Responses to the depression screening suggest patient is having depressive symptoms. Patient is followed for this and is not interested in referral.     AIDAN screening results for this patient suggest a high likelihood of sleep apnea, which can contribute to hypertension. Patient has not been previously diagnosed with sleep apnea. Sleep has improved with use of trazodone. Patient will discuss whether PCP recommends sleep study at upcoming appointment next week.    Patient reports dry cough since last weekend but also had runny nose part of the time. He will inform me if cough continues without cause. BP measurements in morning have been within 1 hour of 2 cups of coffee, and patient is not always resting prior. Discussed timing and technique of BP measurement. Started DASH diet 6/4 and drinking 4-5 bottles of water/day.

## 2018-06-13 ENCOUNTER — CLINICAL SUPPORT (OUTPATIENT)
Dept: OTOLARYNGOLOGY | Facility: CLINIC | Age: 48
End: 2018-06-13
Payer: COMMERCIAL

## 2018-06-13 ENCOUNTER — OFFICE VISIT (OUTPATIENT)
Dept: INTERNAL MEDICINE | Facility: CLINIC | Age: 48
End: 2018-06-13
Payer: COMMERCIAL

## 2018-06-13 VITALS
OXYGEN SATURATION: 98 % | HEART RATE: 74 BPM | HEIGHT: 72 IN | BODY MASS INDEX: 29.71 KG/M2 | WEIGHT: 219.38 LBS | DIASTOLIC BLOOD PRESSURE: 82 MMHG | TEMPERATURE: 97 F | SYSTOLIC BLOOD PRESSURE: 124 MMHG

## 2018-06-13 DIAGNOSIS — Z63.4 GRIEF AT LOSS OF CHILD: ICD-10-CM

## 2018-06-13 DIAGNOSIS — J30.9 ALLERGIC RHINITIS, UNSPECIFIED SEASONALITY, UNSPECIFIED TRIGGER: Primary | ICD-10-CM

## 2018-06-13 DIAGNOSIS — F43.21 GRIEF AT LOSS OF CHILD: ICD-10-CM

## 2018-06-13 DIAGNOSIS — I10 ESSENTIAL HYPERTENSION: Primary | ICD-10-CM

## 2018-06-13 PROCEDURE — 99999 PR PBB SHADOW E&M-EST. PATIENT-LVL I: CPT | Mod: PBBFAC,,,

## 2018-06-13 PROCEDURE — 3079F DIAST BP 80-89 MM HG: CPT | Mod: CPTII,S$GLB,, | Performed by: PEDIATRICS

## 2018-06-13 PROCEDURE — 3008F BODY MASS INDEX DOCD: CPT | Mod: CPTII,S$GLB,, | Performed by: PEDIATRICS

## 2018-06-13 PROCEDURE — 99999 PR PBB SHADOW E&M-EST. PATIENT-LVL III: CPT | Mod: PBBFAC,,, | Performed by: PEDIATRICS

## 2018-06-13 PROCEDURE — 3074F SYST BP LT 130 MM HG: CPT | Mod: CPTII,S$GLB,, | Performed by: PEDIATRICS

## 2018-06-13 PROCEDURE — 95117 IMMUNOTHERAPY INJECTIONS: CPT | Mod: S$GLB,,, | Performed by: OTOLARYNGOLOGY

## 2018-06-13 PROCEDURE — 99214 OFFICE O/P EST MOD 30 MIN: CPT | Mod: S$GLB,,, | Performed by: PEDIATRICS

## 2018-06-13 RX ORDER — LOSARTAN POTASSIUM 50 MG/1
50 TABLET ORAL DAILY
Qty: 90 TABLET | Refills: 3 | Status: SHIPPED | OUTPATIENT
Start: 2018-06-13 | End: 2018-09-21 | Stop reason: DRUGHIGH

## 2018-06-13 SDOH — SOCIAL DETERMINANTS OF HEALTH (SDOH): DISSAPEARANCE AND DEATH OF FAMILY MEMBER: Z63.4
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
## 2018-06-13 NOTE — PROGRESS NOTES
Date of treatment initiation: 04/09/2015  Initial SNOT-20 score: 28  Date of last followup visit with referring physician: 06/10/2016  Date next followup visit is due:06/2017  Most recent SNOT-20 score:  0  Date SNOT-20 is due: 09.09.17    No Known Drug Allergies    Ordering Physician: Dr. Kurtz      MAINTENANCE VIALS - MANUFACTURED BY [employer]    Mix #1 - LOT# [id number]  EXP: 01/23/19  Allergens: trees  Mix #2 - LOT# [id number]  EXP: 01/23/19  Allergens: weeds and grassed  Mix #3 - LOT# [id number]  EXP: 01/23/19  Allergens: molds, mites, cockroach, jason, dog, feathers      Administered 0.05 mL of red maintenance vial -  mix #1 and #2 in left arm & mix #3 in right arm subcutaneously at 0923 pm manufactured by [employer]. Patient reports that he will wait in the building for 20 minutes after injection, he states that he will contact our office with any questions, concerns, or signs of a reaction.

## 2018-06-14 ENCOUNTER — PATIENT OUTREACH (OUTPATIENT)
Dept: OTHER | Facility: OTHER | Age: 48
End: 2018-06-14

## 2018-06-14 NOTE — PROGRESS NOTES
Last 5 Patient Entered Readings                                      Current 30 Day Average: 135/84     Recent Readings 6/13/2018 6/12/2018 6/11/2018 6/8/2018 6/7/2018    SBP (mmHg) 129 151 143 140 131    DBP (mmHg) 84 92 92 90 82    Pulse 71 93 74 84 88          Digital Medicine: Health  Follow Up    Lifestyle Modifications:    1.Dietary Modifications (Sodium intake <2,000mg/day, food labels, dining out): Patient states that he and his wife started the DASH diet on 06/04.  Patient states he has been watching his portions.  Patient states he has lost 7lbs since starting DASH.  Patient states he has been looking up recipes from DASH diet.  Congratulated patient on weight loss and offered to send 7 day meal plan that is low in sodium.  Patient expressed interest.  Will e-mail patient 7 day meal plan.    2.Physical Activity: Patient states he and his wife are enjoying walking outside.  Encouraged patient to keep up with the physical activity.    3.Medication Therapy: Patient has been compliant with the medication regimen.  Patient states that his PCP took him off of lisinopril and replaced with losartan because of dry cough.  Patient states he took it this morning and has noticed his cough has decreased.    4.Patient has the following medication side effects/concerns: none.  (Frequency/Alleviating factors/Precipitating factors, etc.)     Follow up with Mr. Irineo Betts completed. No further questions or concerns. Will continue follow up to achieve health goals.    Patient states he is taking his blood pressure in the afternoons or the evening because of caffeine intake in the morning.

## 2018-06-21 ENCOUNTER — CLINICAL SUPPORT (OUTPATIENT)
Dept: OTOLARYNGOLOGY | Facility: CLINIC | Age: 48
End: 2018-06-21
Payer: COMMERCIAL

## 2018-06-21 DIAGNOSIS — J30.9 ALLERGIC RHINITIS, UNSPECIFIED SEASONALITY, UNSPECIFIED TRIGGER: Primary | ICD-10-CM

## 2018-06-21 PROCEDURE — 99999 PR PBB SHADOW E&M-EST. PATIENT-LVL I: CPT | Mod: PBBFAC,,,

## 2018-06-21 PROCEDURE — 95117 IMMUNOTHERAPY INJECTIONS: CPT | Mod: S$GLB,,, | Performed by: ORTHOPAEDIC SURGERY

## 2018-06-21 NOTE — PROGRESS NOTES
Date of treatment initiation: 04/09/2015  Initial SNOT-20 score: 28  Date of last followup visit with referring physician: 06/10/2016  Date next followup visit is due:06/2017  Most recent SNOT-20 score:  0  Date SNOT-20 is due: 09.09.17    No Known Drug Allergies    Ordering Physician: Dr. Kurtz      MAINTENANCE VIALS - MANUFACTURED BY [employer]    Mix #1 - LOT# [id number]  EXP: 01/23/19  Allergens: trees  Mix #2 - LOT# [id number]  EXP: 01/23/19  Allergens: weeds and grassed  Mix #3 - LOT# [id number]  EXP: 01/23/19  Allergens: molds, mites, cockroach, jason, dog, feathers      Administered 0.10 mL of red maintenance vial -  mix #1 and #2 in left arm & mix #3 in right arm subcutaneously at 1046 manufactured by [employer]. Patient reports that he will wait in the building for 20 minutes after injection, he states that he will contact our office with any questions, concerns, or signs of a reaction.

## 2018-06-22 ENCOUNTER — PATIENT OUTREACH (OUTPATIENT)
Dept: OTHER | Facility: OTHER | Age: 48
End: 2018-06-22

## 2018-06-22 NOTE — PROGRESS NOTES
Last 5 Patient Entered Readings                                      Current 30 Day Average: 136/85     Recent Readings 6/21/2018 6/20/2018 6/19/2018 6/18/2018 6/17/2018    SBP (mmHg) 133 142 147 126 132    DBP (mmHg) 90 89 94 81 79    Pulse 69 81 83 80 70        Patient's BP average is above goal of <130/80.     Patient denies s/s of hypotension (lightheadedness, dizziness, nausea, fatigue) associated with low readings. Instructed patient to inform me if this occurs, patient confirms understanding.      Patient denies s/s of hypertension (SOB, CP, severe headaches, changes in vision) associated with high readings. Instructed patient to go to the ED if BP > 180/110 and accompanied by hypertensive s/s, patient confirms understanding.    Will continue to monitor regularly. Will follow up in 2-3 weeks, sooner if BP begins to trend upward or downward.    Patient has my contact information and knows to call with any concerns or clinical changes.     Current HTN regimen:  Hypertension Medications             losartan (COZAAR) 50 MG tablet Take 1 tablet (50 mg total) by mouth once daily. Replaces lisinopril        Cough resolved with change from lisinopril to losartan (started losartan 6/14). Wife is following DASH diet with patient. They are also walking about 30 minutes per day during the week. Patient tries to drink 5 bottles of water during the day at work. Drinks about 2 cups of coffee in the morning. May drink small amount of Coke, apple juice, or gatorade. Noted that recent office BP was lower. Reviewed BP measurement technique including placement of cuff on arm and unwrapping cuff between uses. Consider splitting losartan to BID dosing or increasing dose if BP remains elevated on follow up.

## 2018-07-05 ENCOUNTER — CLINICAL SUPPORT (OUTPATIENT)
Dept: OTOLARYNGOLOGY | Facility: CLINIC | Age: 48
End: 2018-07-05
Payer: COMMERCIAL

## 2018-07-05 ENCOUNTER — PATIENT OUTREACH (OUTPATIENT)
Dept: OTHER | Facility: OTHER | Age: 48
End: 2018-07-05

## 2018-07-05 DIAGNOSIS — J30.9 ALLERGIC RHINITIS, UNSPECIFIED SEASONALITY, UNSPECIFIED TRIGGER: Primary | ICD-10-CM

## 2018-07-05 PROCEDURE — 95117 IMMUNOTHERAPY INJECTIONS: CPT | Mod: S$GLB,,, | Performed by: OTOLARYNGOLOGY

## 2018-07-05 PROCEDURE — 99999 PR PBB SHADOW E&M-EST. PATIENT-LVL I: CPT | Mod: PBBFAC,,,

## 2018-07-05 NOTE — PROGRESS NOTES
Date of treatment initiation: 04/09/2015  Initial SNOT-20 score: 28  Date of last followup visit with referring physician: 06/10/2016  Date next followup visit is due:06/2017  Most recent SNOT-20 score:  0  Date SNOT-20 is due: 09.09.17    No Known Drug Allergies    Ordering Physician: Dr. Kurtz      MAINTENANCE VIALS - MANUFACTURED BY [employer]    Mix #1 - LOT# [id number]  EXP: 01/23/19  Allergens: trees  Mix #2 - LOT# [id number]  EXP: 01/23/19  Allergens: weeds and grassed  Mix #3 - LOT# [id number]  EXP: 01/23/19  Allergens: molds, mites, cockroach, jason, dog, feathers      Administered 0.10 mL of red maintenance vial -  mix #1 and #2 in left arm & mix #3 in right arm subcutaneously at 1059 manufactured by [employer]. Patient reports that he will wait in the building for 20 minutes after injection, he states that he will contact our office with any questions, concerns, or signs of a reaction.

## 2018-07-05 NOTE — PROGRESS NOTES
Last 5 Patient Entered Readings                                      Current 30 Day Average: 138/88     Recent Readings 7/4/2018 7/2/2018 6/28/2018 6/26/2018 6/24/2018    SBP (mmHg) 134 143 150 145 148    DBP (mmHg) 90 87 89 93 88    Pulse 76 79 75 79 89        Digital Medicine: Health  Follow Up    Lifestyle Modifications:    1.Dietary Modifications (Sodium intake <2,000mg/day, food labels, dining out): Patient states that he is maintaining a low sodium diet.  Patient reports that he received information about recipes based on DASH diet and has been following it.  Encouraged patient to maintain low sodium diet.    2.Physical Activity: Patient reports that he is still staying active and going on walks.    3.Medication Therapy: Patient has been compliant with the medication regimen.    4.Patient has the following medication side effects/concerns: none.  (Frequency/Alleviating factors/Precipitating factors, etc.)     Follow up with Mr. Irineo ANGELES Roxane completed. No further questions or concerns. Will continue follow up to achieve health goals.    Patient states that he is not sure why his average is on upward trend.  Patient reports he has not done anything out of the norm.  Patient explained the technique he uses to take blood pressure.  When asked about any stressors in his life at this time, patient states that his daughter just graduated from cosmetology school and had a party with family and friends.  Patient reports that getting everything together has caused some stress.  Patient also states that last week, he had a stressful time at work.  Patient reports he is getting plenty of rest.

## 2018-07-06 ENCOUNTER — PATIENT OUTREACH (OUTPATIENT)
Dept: OTHER | Facility: OTHER | Age: 48
End: 2018-07-06

## 2018-07-06 DIAGNOSIS — I10 ESSENTIAL HYPERTENSION: Primary | ICD-10-CM

## 2018-07-06 RX ORDER — CHLORTHALIDONE 25 MG/1
TABLET ORAL
Qty: 30 TABLET | Refills: 2 | Status: SHIPPED | OUTPATIENT
Start: 2018-07-06 | End: 2018-10-09 | Stop reason: SDUPTHER

## 2018-07-06 NOTE — PROGRESS NOTES
Last 5 Patient Entered Readings                                      Current 30 Day Average: 139/87     Recent Readings 7/4/2018 7/2/2018 6/28/2018 6/26/2018 6/24/2018    SBP (mmHg) 134 143 150 145 148    DBP (mmHg) 90 87 89 93 88    Pulse 76 79 75 79 89          Patient's BP average is above goal of <130/80.     Patient denies s/s of hypotension (lightheadedness, dizziness, nausea, fatigue) associated with low readings. Instructed patient to inform me if this occurs, patient confirms understanding.      Patient denies s/s of hypertension (SOB, CP, severe headaches, changes in vision) associated with high readings. Instructed patient to go to the ED if BP > 180/110 and accompanied by hypertensive s/s, patient confirms understanding.    Will continue to monitor regularly. Will follow up in 2-3 weeks, sooner if BP begins to trend upward or downward.    Patient has my contact information and knows to call with any concerns or clinical changes.     Current HTN regimen:  Hypertension Medications             losartan (COZAAR) 50 MG tablet Take 1 tablet (50 mg total) by mouth once daily. Replaces lisinopril        Patient continues to follow low sodium diet and increased water intake (up to 5-6 bottles per day). Begin chlorthalidone 12.5mg once daily and repeat BMP 7/19.

## 2018-07-12 ENCOUNTER — CLINICAL SUPPORT (OUTPATIENT)
Dept: OTOLARYNGOLOGY | Facility: CLINIC | Age: 48
End: 2018-07-12
Payer: COMMERCIAL

## 2018-07-12 DIAGNOSIS — J30.9 ALLERGIC RHINITIS, UNSPECIFIED SEASONALITY, UNSPECIFIED TRIGGER: Primary | ICD-10-CM

## 2018-07-12 PROCEDURE — 99999 PR PBB SHADOW E&M-EST. PATIENT-LVL I: CPT | Mod: PBBFAC,,,

## 2018-07-12 PROCEDURE — 95117 IMMUNOTHERAPY INJECTIONS: CPT | Mod: S$GLB,,, | Performed by: OTOLARYNGOLOGY

## 2018-07-12 NOTE — PROGRESS NOTES
Date of treatment initiation: 04/09/2015  Initial SNOT-20 score: 28  Date of last followup visit with referring physician: 06/10/2016  Date next followup visit is due:06/2017  Most recent SNOT-20 score:  0  Date SNOT-20 is due: 09.09.17    No Known Drug Allergies    Ordering Physician: Dr. Kurtz      MAINTENANCE VIALS - MANUFACTURED BY [employer]    Mix #1 - LOT# [id number]  EXP: 01/23/19  Allergens: trees  Mix #2 - LOT# [id number]  EXP: 01/23/19  Allergens: weeds and grassed  Mix #3 - LOT# [id number]  EXP: 01/23/19  Allergens: molds, mites, cockroach, jason, dog, feathers      Administered 0.15 mL of red maintenance vial -  mix #1 and #2 in left arm & mix #3 in right arm subcutaneously at 1106 manufactured by [employer]. Patient reports that he will wait in the building for 20 minutes after injection, he states that he will contact our office with any questions, concerns, or signs of a reaction.

## 2018-07-18 ENCOUNTER — CLINICAL SUPPORT (OUTPATIENT)
Dept: OTOLARYNGOLOGY | Facility: CLINIC | Age: 48
End: 2018-07-18
Payer: COMMERCIAL

## 2018-07-18 ENCOUNTER — LAB VISIT (OUTPATIENT)
Dept: LAB | Facility: HOSPITAL | Age: 48
End: 2018-07-18
Attending: PEDIATRICS
Payer: COMMERCIAL

## 2018-07-18 DIAGNOSIS — I10 ESSENTIAL HYPERTENSION: ICD-10-CM

## 2018-07-18 DIAGNOSIS — J30.9 ALLERGIC RHINITIS, UNSPECIFIED SEASONALITY, UNSPECIFIED TRIGGER: ICD-10-CM

## 2018-07-18 LAB
ANION GAP SERPL CALC-SCNC: 9 MMOL/L
BUN SERPL-MCNC: 14 MG/DL
CALCIUM SERPL-MCNC: 9.8 MG/DL
CHLORIDE SERPL-SCNC: 100 MMOL/L
CO2 SERPL-SCNC: 30 MMOL/L
CREAT SERPL-MCNC: 0.9 MG/DL
EST. GFR  (AFRICAN AMERICAN): >60 ML/MIN/1.73 M^2
EST. GFR  (NON AFRICAN AMERICAN): >60 ML/MIN/1.73 M^2
GLUCOSE SERPL-MCNC: 76 MG/DL
POTASSIUM SERPL-SCNC: 4 MMOL/L
SODIUM SERPL-SCNC: 139 MMOL/L

## 2018-07-18 PROCEDURE — 80048 BASIC METABOLIC PNL TOTAL CA: CPT

## 2018-07-18 PROCEDURE — 36415 COLL VENOUS BLD VENIPUNCTURE: CPT | Mod: PO

## 2018-07-18 PROCEDURE — 99999 PR PBB SHADOW E&M-EST. PATIENT-LVL I: CPT | Mod: PBBFAC,,,

## 2018-07-18 PROCEDURE — 95117 IMMUNOTHERAPY INJECTIONS: CPT | Mod: S$GLB,,, | Performed by: OTOLARYNGOLOGY

## 2018-07-18 NOTE — PROGRESS NOTES
Date of treatment initiation: 04/09/2015  Initial SNOT-20 score: 28  Date of last followup visit with referring physician: 06/10/2016  Date next followup visit is due:06/2017  Most recent SNOT-20 score:  0  Date SNOT-20 is due: 09.09.17    No Known Drug Allergies    Ordering Physician: Dr. Kurtz      MAINTENANCE VIALS - MANUFACTURED BY [employer]    Mix #1 - LOT# [id number]  EXP: 01/23/19  Allergens: trees  Mix #2 - LOT# [id number]  EXP: 01/23/19  Allergens: weeds and grassed  Mix #3 - LOT# [id number]  EXP: 01/23/19  Allergens: molds, mites, cockroach, jason, dog, feathers      Administered 0.2 mL of red maintenance vial -  mix #1 and #2 in left arm & mix #3 in right arm subcutaneously at 1055 manufactured by [employer]. Patient reports that he will wait in the building for 20 minutes after injection, he states that he will contact our office with any questions, concerns, or signs of a reaction.

## 2018-07-23 ENCOUNTER — PATIENT OUTREACH (OUTPATIENT)
Dept: OTHER | Facility: OTHER | Age: 48
End: 2018-07-23

## 2018-07-23 NOTE — PROGRESS NOTES
Last 5 Patient Entered Readings                                      Current 30 Day Average: 139/87     Recent Readings 7/23/2018 7/18/2018 7/15/2018 7/14/2018 7/11/2018    SBP (mmHg) 135 124 151 131 134    DBP (mmHg) 93 81 84 85 86    Pulse 79 79 102 70 77          Patient's BP average is above goal of <130/80.     Patient denies s/s of hypotension (lightheadedness, dizziness, nausea, fatigue) associated with low readings. Instructed patient to inform me if this occurs, patient confirms understanding.      Patient denies s/s of hypertension (SOB, CP, severe headaches, changes in vision) associated with high readings. Instructed patient to go to the ED if BP > 180/110 and accompanied by hypertensive s/s, patient confirms understanding.    Will continue to monitor regularly. Will follow up in 2-3 weeks, sooner if BP begins to trend upward or downward.    Patient has my contact information and knows to call with any concerns or clinical changes.     Current HTN regimen:  Hypertension Medications             chlorthalidone (HYGROTEN) 25 MG Tab Take 1/2 tablet by mouth daily and may increase to 1 tablet daily in 2 weeks    losartan (COZAAR) 50 MG tablet Take 1 tablet (50 mg total) by mouth once daily. Replaces lisinopril        7/23 - left voicemail. Follow up BMP and chlorthalidone initiation.  7/24 - Patient returned call. He is tolerating chlorthalidone. He reports possibly increased sodium intake prior to 7/23 BP but unsure. Overall, BP improving. Consider increasing chlorthalidone if BP not improved on follow up. Encouraged patient to continue to follow low sodium diet.

## 2018-07-26 ENCOUNTER — CLINICAL SUPPORT (OUTPATIENT)
Dept: OTOLARYNGOLOGY | Facility: CLINIC | Age: 48
End: 2018-07-26
Payer: COMMERCIAL

## 2018-07-26 DIAGNOSIS — J30.9 ALLERGIC RHINITIS, UNSPECIFIED SEASONALITY, UNSPECIFIED TRIGGER: ICD-10-CM

## 2018-07-26 PROCEDURE — 95117 IMMUNOTHERAPY INJECTIONS: CPT | Mod: S$GLB,,, | Performed by: OTOLARYNGOLOGY

## 2018-07-26 NOTE — PROGRESS NOTES
Date of treatment initiation: 04/09/2015  Initial SNOT-20 score: 28  Date of last followup visit with referring physician: 06/10/2016  Date next followup visit is due:06/2017  Most recent SNOT-20 score:  0  Date SNOT-20 is due: January 2019    No Known Drug Allergies    Ordering Physician: Dr. Kurtz      MAINTENANCE VIALS - MANUFACTURED BY [employer]    Mix #1 - LOT# [id number]  EXP: 01/23/19  Allergens: trees  Mix #2 - LOT# [id number]  EXP: 01/23/19  Allergens: weeds and grassed  Mix #3 - LOT# [id number]  EXP: 01/23/19  Allergens: molds, mites, cockroach, jason, dog, feathers      Administered 0.25 mL of red maintenance vial -  mix #1 and #2 in left arm & mix #3 in right arm subcutaneously at 1115 manufactured by [employer]. Patient reports that he will wait in the building for 20 minutes after injection, he states that he will contact our office with any questions, concerns, or signs of a reaction.

## 2018-07-27 ENCOUNTER — PATIENT OUTREACH (OUTPATIENT)
Dept: OTHER | Facility: OTHER | Age: 48
End: 2018-07-27

## 2018-07-27 NOTE — PROGRESS NOTES
Last 5 Patient Entered Readings                                      Current 30 Day Average: 137/86     Recent Readings 7/25/2018 7/23/2018 7/18/2018 7/15/2018 7/14/2018    SBP (mmHg) 134 135 124 151 131    DBP (mmHg) 81 93 81 84 85    Pulse 94 79 79 102 70          07/27: LVM.  Will call back on 08/03

## 2018-07-27 NOTE — PROGRESS NOTES
Last 5 Patient Entered Readings                                      Current 30 Day Average: 137/86     Recent Readings 7/25/2018 7/23/2018 7/18/2018 7/15/2018 7/14/2018    SBP (mmHg) 134 135 124 151 131    DBP (mmHg) 81 93 81 84 85    Pulse 94 79 79 102 70          Digital Medicine: Health  Follow Up    Lifestyle Modifications:    1.Dietary Modifications (Sodium intake <2,000mg/day, food labels, dining out): Patient states that he and his wife have been cutting back on sodium since starting the program.  He is more cautious about using mayonnaise and salad dressings.  Also watching portion sizes.  Will send patient updated quick and easy recipes to email.    2.Physical Activity: Patient reports that he has been trying to exercise every day, but states there are some days when he does not have time.  States he could work on exercising a little more.  States he has an apple watch and finds that the rings and reminders to stand up are motivating.    3.Medication Therapy: Patient has been compliant with the medication regimen.    4.Patient has the following medication side effects/concerns: none.  (Frequency/Alleviating factors/Precipitating factors, etc.)     Follow up with Mr. Irineo Betts completed. No further questions or concerns. Will continue follow up to achieve health goals.

## 2018-08-02 ENCOUNTER — CLINICAL SUPPORT (OUTPATIENT)
Dept: OTOLARYNGOLOGY | Facility: CLINIC | Age: 48
End: 2018-08-02
Payer: COMMERCIAL

## 2018-08-02 DIAGNOSIS — J30.9 ALLERGIC RHINITIS, UNSPECIFIED SEASONALITY, UNSPECIFIED TRIGGER: ICD-10-CM

## 2018-08-02 PROCEDURE — 99999 PR PBB SHADOW E&M-EST. PATIENT-LVL I: CPT | Mod: PBBFAC,,,

## 2018-08-02 PROCEDURE — 95117 IMMUNOTHERAPY INJECTIONS: CPT | Mod: S$GLB,,, | Performed by: OTOLARYNGOLOGY

## 2018-08-02 NOTE — PROGRESS NOTES
Date of treatment initiation: 04/09/2015  Initial SNOT-20 score: 28  Date of last followup visit with referring physician: 06/10/2016  Date next followup visit is due:06/2017  Most recent SNOT-20 score:  0  Date SNOT-20 is due: January 2019    No Known Drug Allergies    Ordering Physician: Dr. Kurtz      MAINTENANCE VIALS - MANUFACTURED BY [employer]    Mix #1 - LOT# [id number]  EXP: 01/23/19  Allergens: trees  Mix #2 - LOT# [id number]  EXP: 01/23/19  Allergens: weeds and grassed  Mix #3 - LOT# [id number]  EXP: 01/23/19  Allergens: molds, mites, cockroach, jason, dog, feathers      Administered 0.3 mL of red maintenance vial -  mix #1 and #2 in left arm & mix #3 in right arm subcutaneously at 1140 manufactured by [employer]. Patient reports that he will wait in the building for 20 minutes after injection, he states that he will contact our office with any questions, concerns, or signs of a reaction.

## 2018-08-10 ENCOUNTER — PATIENT OUTREACH (OUTPATIENT)
Dept: OTHER | Facility: OTHER | Age: 48
End: 2018-08-10

## 2018-08-10 DIAGNOSIS — I10 ESSENTIAL HYPERTENSION: Primary | ICD-10-CM

## 2018-08-10 NOTE — PROGRESS NOTES
Last 5 Patient Entered Readings                                      Current 30 Day Average: 135/86     Recent Readings 8/8/2018 8/1/2018 7/29/2018 7/25/2018 7/23/2018    SBP (mmHg) 149 123 135 134 135    DBP (mmHg) 87 90 86 81 93    Pulse 84 75 93 94 79          Left message to follow up. Discuss increasing chlorthalidone or losartan.

## 2018-08-16 ENCOUNTER — CLINICAL SUPPORT (OUTPATIENT)
Dept: OTOLARYNGOLOGY | Facility: CLINIC | Age: 48
End: 2018-08-16
Payer: COMMERCIAL

## 2018-08-16 DIAGNOSIS — J30.9 ALLERGIC RHINITIS, UNSPECIFIED SEASONALITY, UNSPECIFIED TRIGGER: ICD-10-CM

## 2018-08-16 PROCEDURE — 99999 PR PBB SHADOW E&M-EST. PATIENT-LVL I: CPT | Mod: PBBFAC,,,

## 2018-08-16 PROCEDURE — 95117 IMMUNOTHERAPY INJECTIONS: CPT | Mod: S$GLB,,, | Performed by: OTOLARYNGOLOGY

## 2018-08-16 NOTE — PROGRESS NOTES
Date of treatment initiation: 04/09/2015  Initial SNOT-20 score: 28  Date of last followup visit with referring physician: 06/10/2016  Date next followup visit is due:06/2017  Most recent SNOT-20 score:  0  Date SNOT-20 is due: January 2019    No Known Drug Allergies    Ordering Physician: Dr. Kurtz      MAINTENANCE VIALS - MANUFACTURED BY [employer]    Mix #1 - LOT# [id number]  EXP: 01/23/19  Allergens: trees  Mix #2 - LOT# [id number]  EXP: 01/23/19  Allergens: weeds and grassed  Mix #3 - LOT# [id number]  EXP: 01/23/19  Allergens: molds, mites, cockroach, jason, dog, feathers      Administered 0.35 mL of red maintenance vial -  mix #1 and #2 in left arm & mix #3 in right arm subcutaneously at 0945 manufactured by [employer]. Patient reports that he will wait in the building for 20 minutes after injection, he states that he will contact our office with any questions, concerns, or signs of a reaction.

## 2018-08-20 NOTE — PROGRESS NOTES
Last 5 Patient Entered Readings                                      Current 30 Day Average: 134/84     Recent Readings 8/20/2018 8/15/2018 8/12/2018 8/8/2018 8/1/2018    SBP (mmHg) 130 137 129 149 123    DBP (mmHg) 77 84 77 87 90    Pulse 76 80 99 84 75          Patient's BP average is above goal of <130/80.     Patient denies s/s of hypotension (lightheadedness, dizziness, nausea, fatigue) associated with low readings. Instructed patient to inform me if this occurs, patient confirms understanding.      Patient denies s/s of hypertension (SOB, CP, severe headaches, changes in vision) associated with high readings. Instructed patient to go to the ED if BP > 180/110 and accompanied by hypertensive s/s, patient confirms understanding.    Will continue to monitor regularly. Will follow up in 2-3 weeks, sooner if BP begins to trend upward or downward.    Patient has my contact information and knows to call with any concerns or clinical changes.     Current HTN regimen:  Hypertension Medications             chlorthalidone (HYGROTEN) 25 MG Tab Take 1/2 tablet by mouth daily and may increase to 1 tablet daily in 2 weeks    losartan (COZAAR) 50 MG tablet Take 1 tablet (50 mg total) by mouth once daily. Replaces lisinopril        BP improving but remains above goal. Patient unable to identify differences between at goal and elevated BP readings. Increase chlorthalidone and repeat BMP ~ 2 weeks.

## 2018-08-23 ENCOUNTER — CLINICAL SUPPORT (OUTPATIENT)
Dept: OTOLARYNGOLOGY | Facility: CLINIC | Age: 48
End: 2018-08-23
Payer: COMMERCIAL

## 2018-08-23 DIAGNOSIS — J30.9 ALLERGIC RHINITIS, UNSPECIFIED SEASONALITY, UNSPECIFIED TRIGGER: ICD-10-CM

## 2018-08-23 PROCEDURE — 99999 PR PBB SHADOW E&M-EST. PATIENT-LVL I: CPT | Mod: PBBFAC,,,

## 2018-08-23 PROCEDURE — 95117 IMMUNOTHERAPY INJECTIONS: CPT | Mod: S$GLB,,, | Performed by: OTOLARYNGOLOGY

## 2018-08-23 NOTE — PROGRESS NOTES
Date of treatment initiation: 04/09/2015  Initial SNOT-20 score: 28  Date of last followup visit with referring physician: 06/10/2016  Date next followup visit is due:06/2017  Most recent SNOT-20 score:  0  Date SNOT-20 is due: January 2019    No Known Drug Allergies    Ordering Physician: Dr. Kurtz      MAINTENANCE VIALS - MANUFACTURED BY [employer]    Mix #1 - LOT# [id number]  EXP: 01/23/19  Allergens: trees  Mix #2 - LOT# [id number]  EXP: 01/23/19  Allergens: weeds and grassed  Mix #3 - LOT# [id number]  EXP: 01/23/19  Allergens: molds, mites, cockroach, jason, dog, feathers      Administered 0.4 mL of red maintenance vial -  mix #1 and #2 in left arm & mix #3 in right arm subcutaneously at 1100manufactured by [employer]. Patient reports that he will wait in the building for 20 minutes after injection, he states that he will contact our office with any questions, concerns, or signs of a reaction.

## 2018-08-27 ENCOUNTER — PATIENT OUTREACH (OUTPATIENT)
Dept: OTHER | Facility: OTHER | Age: 48
End: 2018-08-27

## 2018-08-27 NOTE — PROGRESS NOTES
Last 5 Patient Entered Readings                                      Current 30 Day Average: 134/82     Recent Readings 8/21/2018 8/20/2018 8/15/2018 8/12/2018 8/8/2018    SBP (mmHg) 136 130 137 129 149    DBP (mmHg) 74 77 84 77 87    Pulse 79 76 80 99 84          Digital Medicine: Health  Follow Up    Lifestyle Modifications:    1.Dietary Modifications (Sodium intake <2,000mg/day, food labels, dining out): Patient reports that he is still following the DASH diet and watching sodium intake.    2.Physical Activity: States he has not been exercising as much as he needs to due to work and doing things around the house.  Encouraged patient to try incorporating a 20-30min walk before or after dinner about 3x a week.  Patient agreed.    3.Medication Therapy: Patient has been compliant with the medication regimen.    4.Patient has the following medication side effects/concerns: none.  (Frequency/Alleviating factors/Precipitating factors, etc.)     Follow up with Mr. Irineo Betts completed. No further questions or concerns. Will continue follow up to achieve health goals.

## 2018-09-04 ENCOUNTER — CLINICAL SUPPORT (OUTPATIENT)
Dept: OTOLARYNGOLOGY | Facility: CLINIC | Age: 48
End: 2018-09-04
Payer: COMMERCIAL

## 2018-09-04 ENCOUNTER — LAB VISIT (OUTPATIENT)
Dept: LAB | Facility: HOSPITAL | Age: 48
End: 2018-09-04
Attending: PEDIATRICS
Payer: COMMERCIAL

## 2018-09-04 DIAGNOSIS — J30.9 ALLERGIC RHINITIS, UNSPECIFIED SEASONALITY, UNSPECIFIED TRIGGER: ICD-10-CM

## 2018-09-04 DIAGNOSIS — I10 ESSENTIAL HYPERTENSION: ICD-10-CM

## 2018-09-04 LAB
ANION GAP SERPL CALC-SCNC: 12 MMOL/L
BUN SERPL-MCNC: 14 MG/DL
CALCIUM SERPL-MCNC: 10.2 MG/DL
CHLORIDE SERPL-SCNC: 100 MMOL/L
CO2 SERPL-SCNC: 27 MMOL/L
CREAT SERPL-MCNC: 0.8 MG/DL
EST. GFR  (AFRICAN AMERICAN): >60 ML/MIN/1.73 M^2
EST. GFR  (NON AFRICAN AMERICAN): >60 ML/MIN/1.73 M^2
GLUCOSE SERPL-MCNC: 81 MG/DL
POTASSIUM SERPL-SCNC: 4 MMOL/L
SODIUM SERPL-SCNC: 139 MMOL/L

## 2018-09-04 PROCEDURE — 80048 BASIC METABOLIC PNL TOTAL CA: CPT

## 2018-09-04 PROCEDURE — 99999 PR PBB SHADOW E&M-EST. PATIENT-LVL I: CPT | Mod: PBBFAC,,,

## 2018-09-04 PROCEDURE — 36415 COLL VENOUS BLD VENIPUNCTURE: CPT | Mod: PO

## 2018-09-04 PROCEDURE — 95117 IMMUNOTHERAPY INJECTIONS: CPT | Mod: S$GLB,,, | Performed by: OTOLARYNGOLOGY

## 2018-09-04 NOTE — PROGRESS NOTES
Date of treatment initiation: 04/09/2015  Initial SNOT-20 score: 28  Date of last followup visit with referring physician: 06/10/2016  Date next followup visit is due:06/2017  Most recent SNOT-20 score:  0  Date SNOT-20 is due: January 2019    No Known Drug Allergies    Ordering Physician: Dr. Kurtz      MAINTENANCE VIALS - MANUFACTURED BY [employer]    Mix #1 - LOT# [id number]  EXP: 01/23/19  Allergens: trees  Mix #2 - LOT# [id number]  EXP: 01/23/19  Allergens: weeds and grassed  Mix #3 - LOT# [id number]  EXP: 01/23/19  Allergens: molds, mites, cockroach, jason, dog, feathers      Administered 0.45 mL of red maintenance vial -  mix #1 and #2 in left arm & mix #3 in right arm subcutaneously at 0945 manufactured by [facility]. Patient reports that he will wait in the building for 20 minutes after injection, he states that he will contact our office with any questions, concerns, or signs of a reaction.

## 2018-09-07 ENCOUNTER — PATIENT OUTREACH (OUTPATIENT)
Dept: OTHER | Facility: OTHER | Age: 48
End: 2018-09-07

## 2018-09-07 DIAGNOSIS — I10 ESSENTIAL HYPERTENSION: ICD-10-CM

## 2018-09-07 NOTE — PROGRESS NOTES
Last 5 Patient Entered Readings                                      Current 30 Day Average: 138/81     Recent Readings 8/28/2018 8/21/2018 8/20/2018 8/15/2018 8/12/2018    SBP (mmHg) 149 136 130 137 129    DBP (mmHg) 84 74 77 84 77    Pulse 72 79 76 80 99          Left voicemail. Follow up chlorthalidone increase. Request more BP readings.

## 2018-09-11 ENCOUNTER — TELEPHONE (OUTPATIENT)
Dept: OTOLARYNGOLOGY | Facility: CLINIC | Age: 48
End: 2018-09-11

## 2018-09-21 RX ORDER — LOSARTAN POTASSIUM 50 MG/1
50 TABLET ORAL 2 TIMES DAILY
Qty: 180 TABLET | Refills: 1 | Status: SHIPPED | OUTPATIENT
Start: 2018-09-21 | End: 2019-05-21 | Stop reason: SDUPTHER

## 2018-09-21 NOTE — PROGRESS NOTES
Last 5 Patient Entered Readings                                      Current 30 Day Average: 141/86     Recent Readings 9/19/2018 9/19/2018 9/18/2018 9/17/2018 9/7/2018    SBP (mmHg) 142 148 140 134 141    DBP (mmHg) 93 100 86 82 86    Pulse 87 74 77 77 79          Patient's BP average is above goal of <130/80.     Patient denies s/s of hypotension (lightheadedness, dizziness, nausea, fatigue) associated with low readings. Instructed patient to inform me if this occurs, patient confirms understanding.      Patient denies s/s of hypertension (SOB, CP, severe headaches, changes in vision) associated with high readings. Instructed patient to go to the ED if BP > 180/110 and accompanied by hypertensive s/s, patient confirms understanding.    Will continue to monitor regularly. Will follow up in 2-3 weeks, sooner if BP begins to trend upward or downward.    Patient has my contact information and knows to call with any concerns or clinical changes.     Current HTN regimen:  Hypertension Medications             chlorthalidone (HYGROTEN) 25 MG Tab Take 1/2 tablet by mouth daily and may increase to 1 tablet daily in 2 weeks    losartan (COZAAR) 50 MG tablet Take 1 tablet (50 mg total) by mouth once daily. Replaces lisinopril        Drinking 4-5 bottles of water per day. Reports continued healthy diet. Reports increased exercise since last health  outreach (walking 20-30 minutes 3 days/week and cutting grass ~ 3 times over 2 weeks, using push mower). Reports having a new granddaughter 9/14, may have caused some increased anxiety/stress but no other significant changes. Patient amenable to increasing losartan to 50 mg BID.

## 2018-09-27 ENCOUNTER — CLINICAL SUPPORT (OUTPATIENT)
Dept: OTOLARYNGOLOGY | Facility: CLINIC | Age: 48
End: 2018-09-27
Payer: COMMERCIAL

## 2018-09-27 DIAGNOSIS — J30.9 ALLERGIC RHINITIS, UNSPECIFIED SEASONALITY, UNSPECIFIED TRIGGER: ICD-10-CM

## 2018-09-27 PROCEDURE — 99999 PR PBB SHADOW E&M-EST. PATIENT-LVL I: CPT | Mod: PBBFAC,,,

## 2018-09-27 PROCEDURE — 95117 IMMUNOTHERAPY INJECTIONS: CPT | Mod: S$GLB,,, | Performed by: ORTHOPAEDIC SURGERY

## 2018-09-27 NOTE — PROGRESS NOTES
Date of treatment initiation: 04/09/2015  Initial SNOT-20 score: 28  Date of last followup visit with referring physician: 06/10/2016  Date next followup visit is due:06/2017  Most recent SNOT-20 score:  0  Date SNOT-20 is due: January 2019    No Known Drug Allergies    Ordering Physician: Dr. Kurtz      MAINTENANCE VIALS - MANUFACTURED BY [employer]    Mix #1 - LOT# [id number]  EXP: 01/23/19  Allergens: trees  Mix #2 - LOT# [id number]  EXP: 01/23/19  Allergens: weeds and grassed  Mix #3 - LOT# [id number]  EXP: 01/23/19  Allergens: molds, mites, cockroach, jason, dog, feathers      Administered 0.3 mL of red maintenance vial -  mix #1 and #2 in left arm & mix #3 in right arm subcutaneously at 0940 manufactured by [employer]. Patient reports that he will wait in the building for 20 minutes after injection, he states that he will contact our office with any questions, concerns, or signs of a reaction.

## 2018-09-28 ENCOUNTER — PATIENT OUTREACH (OUTPATIENT)
Dept: OTHER | Facility: OTHER | Age: 48
End: 2018-09-28

## 2018-09-28 NOTE — PROGRESS NOTES
Last 5 Patient Entered Readings                                      Current 30 Day Average: 138/87     Recent Readings 9/26/2018 9/19/2018 9/19/2018 9/18/2018 9/17/2018    SBP (mmHg) 133 142 148 140 134    DBP (mmHg) 86 93 100 86 82    Pulse 82 87 74 77 77          Digital Medicine: Health  Follow Up    Lifestyle Modifications:    1.Dietary Modifications (Sodium intake <2,000mg/day, food labels, dining out): States he and his family are continuing to stay mindful of sodium and using recipes from DASH diet.  States they are always checking the food label.    2.Physical Activity: States he is not walking as much as he needs to.  Tries to walk 3x a week and cuts the grass.  Encouraged patient to continue with exercise routine.    3.Medication Therapy: Patient has been compliant with the medication regimen.    4.Patient has the following medication side effects/concerns: none  (Frequency/Alleviating factors/Precipitating factors, etc.)     Follow up with Mr. Irineo Betts completed. No further questions or concerns. Will continue to follow up to achieve health goals.

## 2018-10-08 ENCOUNTER — PATIENT MESSAGE (OUTPATIENT)
Dept: OTHER | Facility: OTHER | Age: 48
End: 2018-10-08

## 2018-10-09 RX ORDER — CHLORTHALIDONE 25 MG/1
TABLET ORAL
Qty: 90 TABLET | Refills: 1 | Status: SHIPPED | OUTPATIENT
Start: 2018-10-09 | End: 2019-04-10 | Stop reason: SDUPTHER

## 2018-10-10 ENCOUNTER — PATIENT OUTREACH (OUTPATIENT)
Dept: OTHER | Facility: OTHER | Age: 48
End: 2018-10-10

## 2018-10-10 NOTE — PROGRESS NOTES
Last 5 Patient Entered Readings                                      Current 30 Day Average: 135/86     Recent Readings 10/10/2018 10/5/2018 10/2/2018 9/26/2018 9/19/2018    SBP (mmHg) 137 129 133 133 142    DBP (mmHg) 88 86 81 86 93    Pulse 66 77 84 82 87          Patient's BP average is above goal of <130/80.     Patient denies s/s of hypotension (lightheadedness, dizziness, nausea, fatigue) associated with low readings. Instructed patient to inform me if this occurs, patient confirms understanding.      Patient denies s/s of hypertension (SOB, CP, severe headaches, changes in vision) associated with high readings. Instructed patient to go to the ED if BP > 180/110 and accompanied by hypertensive s/s, patient confirms understanding.    Patient mistakenly increased chlorthalidone to 25 mg BID instead of losartan. He has now marked his prescription bottles to avoid confusion. Spoke with pharmacy and he will be able to  chlorthalidone refill 10/15. He will be without chlorthalidone for a couple of days but will be taking increased dose of losartan. Patient verbalized understanding.    Will continue to monitor regularly. Will follow up in 2-3 weeks, sooner if BP begins to trend upward or downward.    Patient has my contact information and knows to call with any concerns or clinical changes.     Current HTN regimen:  Hypertension Medications             chlorthalidone (HYGROTEN) 25 MG Tab Take 1 tablet by mouth daily for blood pressure    losartan (COZAAR) 50 MG tablet Take 1 tablet (50 mg total) by mouth 2 (two) times daily. (for blood pressure)

## 2018-10-11 ENCOUNTER — IMMUNIZATION (OUTPATIENT)
Dept: INTERNAL MEDICINE | Facility: CLINIC | Age: 48
End: 2018-10-11
Payer: COMMERCIAL

## 2018-10-11 ENCOUNTER — CLINICAL SUPPORT (OUTPATIENT)
Dept: OTOLARYNGOLOGY | Facility: CLINIC | Age: 48
End: 2018-10-11
Payer: COMMERCIAL

## 2018-10-11 DIAGNOSIS — J30.9 ALLERGIC RHINITIS, UNSPECIFIED SEASONALITY, UNSPECIFIED TRIGGER: ICD-10-CM

## 2018-10-11 PROCEDURE — 90686 IIV4 VACC NO PRSV 0.5 ML IM: CPT | Mod: S$GLB,,, | Performed by: FAMILY MEDICINE

## 2018-10-11 PROCEDURE — 95117 IMMUNOTHERAPY INJECTIONS: CPT | Mod: S$GLB,,, | Performed by: OTOLARYNGOLOGY

## 2018-10-11 PROCEDURE — 99999 PR PBB SHADOW E&M-EST. PATIENT-LVL I: CPT | Mod: PBBFAC,,,

## 2018-10-11 PROCEDURE — 99999 PR PBB SHADOW E&M-EST. PATIENT-LVL II: CPT | Mod: PBBFAC,,,

## 2018-10-11 PROCEDURE — 90471 IMMUNIZATION ADMIN: CPT | Mod: S$GLB,,, | Performed by: FAMILY MEDICINE

## 2018-10-11 NOTE — PROGRESS NOTES
Date of treatment initiation: 04/09/2015  Initial SNOT-20 score: 28  Date of last followup visit with referring physician: 06/10/2016  Date next followup visit is due:06/2017  Most recent SNOT-20 score:  0  Date SNOT-20 is due: January 2019    No Known Drug Allergies    Ordering Physician: Dr. Kurtz      MAINTENANCE VIALS - MANUFACTURED BY [employer]    Mix #1 - LOT# [id number]  EXP: 01/23/19  Allergens: trees  Mix #2 - LOT# [id number]  EXP: 01/23/19  Allergens: weeds and grassed  Mix #3 - LOT# [id number]  EXP: 01/23/19  Allergens: molds, mites, cockroach, jason, dog, feathers      Administered 0.35 mL of red maintenance vial -  mix #1 and #2 in left arm & mix #3 in right arm subcutaneously at 1100 manufactured by [employer]. Patient reports that he will wait in the building for 20 minutes after injection, he states that he will contact our office with any questions, concerns, or signs of a reaction.

## 2018-10-16 ENCOUNTER — OFFICE VISIT (OUTPATIENT)
Dept: OPHTHALMOLOGY | Facility: CLINIC | Age: 48
End: 2018-10-16
Payer: COMMERCIAL

## 2018-10-16 DIAGNOSIS — H52.223 REGULAR ASTIGMATISM OF BOTH EYES: ICD-10-CM

## 2018-10-16 DIAGNOSIS — Z01.00 VISIT FOR EYE AND VISION EXAM: Primary | ICD-10-CM

## 2018-10-16 DIAGNOSIS — H52.4 PRESBYOPIA: ICD-10-CM

## 2018-10-16 DIAGNOSIS — Z13.5 GLAUCOMA SCREENING: ICD-10-CM

## 2018-10-16 PROCEDURE — 92014 COMPRE OPH EXAM EST PT 1/>: CPT | Mod: S$GLB,,, | Performed by: OPTOMETRIST

## 2018-10-16 PROCEDURE — 99999 PR PBB SHADOW E&M-EST. PATIENT-LVL III: CPT | Mod: PBBFAC,,, | Performed by: OPTOMETRIST

## 2018-10-16 PROCEDURE — 92015 DETERMINE REFRACTIVE STATE: CPT | Mod: S$GLB,,, | Performed by: OPTOMETRIST

## 2018-10-16 NOTE — PROGRESS NOTES
HPI     Last [facility] exam 02/02/2017  Screening for glaucoma  RE  Using older pair of distance glasses     Last edited by Marky Condon MA on 10/16/2018  3:53 PM. (History)            Assessment /Plan     For exam results, see Encounter Report.    Visit for eye and vision exam    Glaucoma screening    Regular astigmatism of both eyes    Presbyopia      OH OK OU.  Spec Rx updated.  RTC one year or prn.

## 2018-10-25 ENCOUNTER — CLINICAL SUPPORT (OUTPATIENT)
Dept: OTOLARYNGOLOGY | Facility: CLINIC | Age: 48
End: 2018-10-25
Payer: COMMERCIAL

## 2018-10-25 DIAGNOSIS — J30.9 ALLERGIC RHINITIS, UNSPECIFIED SEASONALITY, UNSPECIFIED TRIGGER: ICD-10-CM

## 2018-10-25 PROCEDURE — 95117 IMMUNOTHERAPY INJECTIONS: CPT | Mod: S$GLB,,, | Performed by: OTOLARYNGOLOGY

## 2018-10-25 PROCEDURE — 99999 PR PBB SHADOW E&M-EST. PATIENT-LVL I: CPT | Mod: PBBFAC,,,

## 2018-10-25 NOTE — PROGRESS NOTES
Date of treatment initiation: 04/09/2015  Initial SNOT-20 score: 28  Date of last followup visit with referring physician: 06/10/2016  Date next followup visit is due:06/2017  Most recent SNOT-20 score:  0  Date SNOT-20 is due: January 2019    No Known Drug Allergies    Ordering Physician: Dr. Kurtz      MAINTENANCE VIALS - MANUFACTURED BY [employer]    Mix #1 - LOT# [id number]  EXP: 01/23/19  Allergens: trees  Mix #2 - LOT# [id number]  EXP: 01/23/19  Allergens: weeds and grassed  Mix #3 - LOT# [id number]  EXP: 01/23/19  Allergens: molds, mites, cockroach, jason, dog, feathers      Administered 0.4 mL of red maintenance vial -  mix #1 and #2 in left arm & mix #3 in right arm subcutaneously at 1030 manufactured by [employer]. Patient reports that he will wait in the building for 20 minutes after injection, he states that he will contact our office with any questions, concerns, or signs of a reaction.

## 2018-10-26 ENCOUNTER — PATIENT OUTREACH (OUTPATIENT)
Dept: OTHER | Facility: OTHER | Age: 48
End: 2018-10-26

## 2018-10-26 NOTE — PROGRESS NOTES
Last 5 Patient Entered Readings                                      Current 30 Day Average: 133/83     Recent Readings 10/24/2018 10/18/2018 10/17/2018 10/10/2018 10/5/2018    SBP (mmHg) 131 127 144 137 129    DBP (mmHg) 79 81 82 88 86    Pulse 71 70 85 66 77          Left voicemail. Follow up on losartan increase and medication adherence. BP improving.

## 2018-10-31 ENCOUNTER — PATIENT OUTREACH (OUTPATIENT)
Dept: OTHER | Facility: OTHER | Age: 48
End: 2018-10-31

## 2018-10-31 NOTE — PROGRESS NOTES
Last 5 Patient Entered Readings                                      Current 30 Day Average: 134/83     Recent Readings 10/24/2018 10/18/2018 10/17/2018 10/10/2018 10/5/2018    SBP (mmHg) 131 127 144 137 129    DBP (mmHg) 79 81 82 88 86    Pulse 71 70 85 66 77          10/31: LVM.  Will call in 2 weeks.  Pharmacist schedule to call next week.

## 2018-10-31 NOTE — PROGRESS NOTES
Last 5 Patient Entered Readings                                      Current 30 Day Average: 134/83     Recent Readings 10/24/2018 10/18/2018 10/17/2018 10/10/2018 10/5/2018    SBP (mmHg) 131 127 144 137 129    DBP (mmHg) 79 81 82 88 86    Pulse 71 70 85 66 77          Digital Medicine: Health  Follow Up    Lifestyle Modifications:    1.Dietary Modifications (Sodium intake <2,000mg/day, food labels, dining out): Reports no change in diet    2.Physical Activity: No change in PA    3.Medication Therapy: Patient has been compliant with the medication regimen.    4.Patient has the following medication side effects/concerns: none  (Frequency/Alleviating factors/Precipitating factors, etc.)     Follow up with Mr. Irineo Betts completed. No further questions or concerns. Will continue to follow up to achieve health goals.    Started taking increase losartan last week.

## 2018-11-08 ENCOUNTER — CLINICAL SUPPORT (OUTPATIENT)
Dept: OTOLARYNGOLOGY | Facility: CLINIC | Age: 48
End: 2018-11-08
Payer: COMMERCIAL

## 2018-11-08 DIAGNOSIS — J30.9 ALLERGIC RHINITIS, UNSPECIFIED SEASONALITY, UNSPECIFIED TRIGGER: ICD-10-CM

## 2018-11-08 PROCEDURE — 95117 IMMUNOTHERAPY INJECTIONS: CPT | Mod: S$GLB,,, | Performed by: OTOLARYNGOLOGY

## 2018-11-08 PROCEDURE — 99999 PR PBB SHADOW E&M-EST. PATIENT-LVL II: CPT | Mod: PBBFAC,,,

## 2018-11-08 NOTE — PROGRESS NOTES
Date of treatment initiation: 04/09/2015  Initial SNOT-20 score: 28  Date of last followup visit with referring physician: 06/10/2016  Date next followup visit is due:06/2017  Most recent SNOT-20 score:  0  Date SNOT-20 is due: January 2019    No Known Drug Allergies    Ordering Physician: Dr. Kurtz      MAINTENANCE VIALS - MANUFACTURED BY [employer]    Mix #1 - LOT# [id number]  EXP: 01/23/19  Allergens: trees  Mix #2 - LOT# [id number]  EXP: 01/23/19  Allergens: weeds and grassed  Mix #3 - LOT# [id number]  EXP: 01/23/19  Allergens: molds, mites, cockroach, jason, dog, feathers      Administered 0.45 mL of red maintenance vial -  mix #1 and #2 in left arm & mix #3 in right arm subcutaneously at 0955 manufactured by [employer]. Patient reports that he will wait in the building for 20 minutes after injection, he states that he will contact our office with any questions, concerns, or signs of a reaction.

## 2018-11-20 NOTE — PROGRESS NOTES
Last 5 Patient Entered Readings                                      Current 30 Day Average: 133/80     Recent Readings 11/14/2018 11/12/2018 11/4/2018 11/1/2018 10/24/2018    SBP (mmHg) 121 143 131 140 131    DBP (mmHg) 78 82 77 82 79    Pulse 68 77 89 83 71          Left voicemail. BP approaching goal. Follow up losartan increase.

## 2018-11-29 ENCOUNTER — PATIENT OUTREACH (OUTPATIENT)
Dept: OTHER | Facility: OTHER | Age: 48
End: 2018-11-29

## 2018-11-29 ENCOUNTER — CLINICAL SUPPORT (OUTPATIENT)
Dept: OTOLARYNGOLOGY | Facility: CLINIC | Age: 48
End: 2018-11-29
Payer: COMMERCIAL

## 2018-11-29 DIAGNOSIS — J30.9 ALLERGIC RHINITIS, UNSPECIFIED SEASONALITY, UNSPECIFIED TRIGGER: ICD-10-CM

## 2018-11-29 PROCEDURE — 95117 IMMUNOTHERAPY INJECTIONS: CPT | Mod: S$GLB,,, | Performed by: OTOLARYNGOLOGY

## 2018-11-29 PROCEDURE — 99999 PR PBB SHADOW E&M-EST. PATIENT-LVL II: CPT | Mod: PBBFAC,,,

## 2018-11-29 NOTE — PROGRESS NOTES
Date of treatment initiation: 04/09/2015  Initial SNOT-20 score: 28  Date of last followup visit with referring physician: 06/10/2016  Date next followup visit is due:06/2017  Most recent SNOT-20 score:  0  Date SNOT-20 is due: January 2019    No Known Drug Allergies    Ordering Physician: Dr. Kurtz      MAINTENANCE VIALS - MANUFACTURED BY [employer]    Mix #1 - LOT# [id number]  EXP: 01/23/19  Allergens: trees  Mix #2 - LOT# [id number]  EXP: 01/23/19  Allergens: weeds and grassed  Mix #3 - LOT# [id number]  EXP: 01/23/19  Allergens: molds, mites, cockroach, jason, dog, feathers      Administered 0.5 mL of red maintenance vial -  mix #1 and #2 in left arm & mix #3 in right arm subcutaneously at 0950 manufactured by [employer]. Patient reports that he will wait in the building for 20 minutes after injection, he states that he will contact our office with any questions, concerns, or signs of a reaction.

## 2018-11-29 NOTE — PROGRESS NOTES
Last 5 Patient Entered Readings                                      Current 30 Day Average: 133/80     Recent Readings 11/29/2018 11/20/2018 11/14/2018 11/12/2018 11/4/2018    SBP (mmHg) 127 133 121 143 131    DBP (mmHg) 77 82 78 82 77    Pulse 72 75 68 77 89          Digital Medicine: Health  Follow Up    Lifestyle Modifications:    1.Dietary Modifications (Sodium intake <2,000mg/day, food labels, dining out): States he is watching what he eats more.  Not going back for seconds and states being more mindful feels like is helping with BP readings.    2.Physical Activity: deferred    3.Medication Therapy: Patient has been compliant with the medication regimen.    4.Patient has the following medication side effects/concerns:  No side effects since 2x/day of losartan.  (Frequency/Alleviating factors/Precipitating factors, etc.)     Follow up with Mr. Irineo Betts completed. No further questions or concerns. Will continue to follow up to achieve health goals.

## 2018-12-05 ENCOUNTER — PATIENT MESSAGE (OUTPATIENT)
Dept: ADMINISTRATIVE | Facility: OTHER | Age: 48
End: 2018-12-05

## 2018-12-13 ENCOUNTER — CLINICAL SUPPORT (OUTPATIENT)
Dept: OTOLARYNGOLOGY | Facility: CLINIC | Age: 48
End: 2018-12-13
Payer: COMMERCIAL

## 2018-12-13 DIAGNOSIS — J30.9 ALLERGIC RHINITIS, UNSPECIFIED SEASONALITY, UNSPECIFIED TRIGGER: ICD-10-CM

## 2018-12-13 PROCEDURE — 95117 IMMUNOTHERAPY INJECTIONS: CPT | Mod: S$GLB,,, | Performed by: OTOLARYNGOLOGY

## 2018-12-13 PROCEDURE — 99999 PR PBB SHADOW E&M-EST. PATIENT-LVL I: CPT | Mod: PBBFAC,,,

## 2018-12-13 NOTE — PROGRESS NOTES
Date of treatment initiation: 04/09/2015  Initial SNOT-20 score: 28  Date of last followup visit with referring physician: 06/10/2016  Date next followup visit is due:06/2017  Most recent SNOT-20 score:  0  Date SNOT-20 is due: January 2019    No Known Drug Allergies    Ordering Physician: Dr. Kurtz      MAINTENANCE VIALS - MANUFACTURED BY [employer]    Mix #1 - LOT# [id number]  EXP: 01/23/19  Allergens: trees  Mix #2 - LOT# [id number]  EXP: 01/23/19  Allergens: weeds and grassed  Mix #3 - LOT# [id number]  EXP: 01/23/19  Allergens: molds, mites, cockroach, jason, dog, feathers      Administered 0.5 mL of red maintenance vial -  mix #1 and #2 in left arm & mix #3 in right arm subcutaneously at 0950 manufactured by [employer]. Patient reports that he will wait in the building for 20 minutes after injection, he states that he will contact our office with any questions, concerns, or signs of a reaction.

## 2018-12-17 NOTE — PROGRESS NOTES
Last 5 Patient Entered Readings                                      Current 30 Day Average: 132/78     Recent Readings 12/10/2018 12/9/2018 12/9/2018 11/29/2018 11/20/2018    SBP (mmHg) 134 135 141 127 133    DBP (mmHg) 75 74 84 77 82    Pulse 65 76 73 72 75        Patient's BP average is above goal of <130/80.     Patient denies s/s of hypotension (lightheadedness, dizziness, nausea, fatigue) associated with low readings. Instructed patient to inform me if this occurs, patient confirms understanding.      Patient denies s/s of hypertension (SOB, CP, severe headaches, changes in vision) associated with high readings. Instructed patient to go to the ED if BP > 180/110 and accompanied by hypertensive s/s, patient confirms understanding.    Patient tolerating increased dose of losartan. BP approaching goal. Advised patient of losartan recall and to contact his pharmacy. He agreed to contact me if he has any issues filling his prescription.    Will continue to monitor regularly. Will follow up in 4-6 weeks, sooner if BP begins to trend upward or downward.    Patient has my contact information and knows to call with any concerns or clinical changes.     Current HTN regimen:  Hypertension Medications             chlorthalidone (HYGROTEN) 25 MG Tab Take 1 tablet by mouth daily for blood pressure    losartan (COZAAR) 50 MG tablet Take 1 tablet (50 mg total) by mouth 2 (two) times daily. (for blood pressure)

## 2018-12-20 ENCOUNTER — CLINICAL SUPPORT (OUTPATIENT)
Dept: OTOLARYNGOLOGY | Facility: CLINIC | Age: 48
End: 2018-12-20
Payer: COMMERCIAL

## 2018-12-20 DIAGNOSIS — J30.9 ALLERGIC RHINITIS, UNSPECIFIED SEASONALITY, UNSPECIFIED TRIGGER: ICD-10-CM

## 2018-12-20 PROCEDURE — 95165 ANTIGEN THERAPY SERVICES: CPT | Mod: S$GLB,,, | Performed by: OTOLARYNGOLOGY

## 2018-12-20 PROCEDURE — 99999 PR PBB SHADOW E&M-EST. PATIENT-LVL II: CPT | Mod: PBBFAC,,,

## 2018-12-20 PROCEDURE — 95117 IMMUNOTHERAPY INJECTIONS: CPT | Mod: S$GLB,,, | Performed by: OTOLARYNGOLOGY

## 2018-12-20 NOTE — PROGRESS NOTES
Date of treatment initiation: 04/09/2015  Initial SNOT-20 score: 28  Date of last followup visit with referring physician: 06/10/2016  Date next followup visit is due:06/2017  Most recent SNOT-20 score:  0  Date SNOT-20 is due: January 2019    No Known Drug Allergies    Ordering Physician: Dr. Kurtz      MAINTENANCE VIALS - MANUFACTURED BY [employer]    Mix #1 - LOT# [id number]  EXP: 01/23/19  Allergens: trees  Mix #2 - LOT# [id number]  EXP: 01/23/19  Allergens: weeds and grassed  Mix #3 - LOT# [id number]  EXP: 01/23/19  Allergens: molds, mites, cockroach, jason, dog, feathers      Administered 0.5 mL of red maintenance vial -  mix #1 and #2 in left arm & mix #3 in right arm subcutaneously at 1055 manufactured by [employer]. Patient reports that he will wait in the building for 20 minutes after injection, he states that he will contact our office with any questions, concerns, or signs of a reaction.

## 2019-01-04 ENCOUNTER — PATIENT OUTREACH (OUTPATIENT)
Dept: OTHER | Facility: OTHER | Age: 49
End: 2019-01-04

## 2019-01-04 NOTE — PROGRESS NOTES
Last 5 Patient Entered Readings                                      Current 30 Day Average: 136/80     Recent Readings 12/21/2018 12/10/2018 12/9/2018 12/9/2018 11/29/2018    SBP (mmHg) 138 134 135 141 127    DBP (mmHg) 85 75 74 84 77    Pulse 74 65 76 73 72          1/4/2019: LVM.  Will call in 1 week.  Reminded patient to send readings.

## 2019-01-05 ENCOUNTER — PATIENT MESSAGE (OUTPATIENT)
Dept: INTERNAL MEDICINE | Facility: CLINIC | Age: 49
End: 2019-01-05

## 2019-01-08 ENCOUNTER — OFFICE VISIT (OUTPATIENT)
Dept: OPHTHALMOLOGY | Facility: CLINIC | Age: 49
End: 2019-01-08
Payer: COMMERCIAL

## 2019-01-08 DIAGNOSIS — H16.001 CORNEA ULCER, RIGHT: Primary | ICD-10-CM

## 2019-01-08 PROCEDURE — 92012 PR EYE EXAM, EST PATIENT,INTERMED: ICD-10-PCS | Mod: S$GLB,,, | Performed by: OPHTHALMOLOGY

## 2019-01-08 PROCEDURE — 87070 CULTURE OTHR SPECIMN AEROBIC: CPT

## 2019-01-08 PROCEDURE — 92012 INTRM OPH EXAM EST PATIENT: CPT | Mod: S$GLB,,, | Performed by: OPHTHALMOLOGY

## 2019-01-08 PROCEDURE — 99999 PR PBB SHADOW E&M-EST. PATIENT-LVL II: ICD-10-PCS | Mod: PBBFAC,,, | Performed by: OPHTHALMOLOGY

## 2019-01-08 PROCEDURE — 99999 PR PBB SHADOW E&M-EST. PATIENT-LVL II: CPT | Mod: PBBFAC,,, | Performed by: OPHTHALMOLOGY

## 2019-01-08 RX ORDER — MOXIFLOXACIN 5 MG/ML
1 SOLUTION/ DROPS OPHTHALMIC
Qty: 5 ML | Refills: 3 | Status: SHIPPED | OUTPATIENT
Start: 2019-01-08 | End: 2019-04-10

## 2019-01-08 RX ORDER — POLYMYXIN B SULFATE AND TRIMETHOPRIM 1; 10000 MG/ML; [USP'U]/ML
1 SOLUTION OPHTHALMIC
Qty: 10 ML | Refills: 1 | Status: SHIPPED | OUTPATIENT
Start: 2019-01-08 | End: 2019-04-10

## 2019-01-08 RX ORDER — VALACYCLOVIR HYDROCHLORIDE 1 G/1
1000 TABLET, FILM COATED ORAL 3 TIMES DAILY
Qty: 30 TABLET | Refills: 3 | Status: SHIPPED | OUTPATIENT
Start: 2019-01-08 | End: 2019-04-10

## 2019-01-08 NOTE — PROGRESS NOTES
HPI     Pt says he woke up Right eye pain- h/o k abrasion OD in the Right eye   2017 -    Last [facility] exam 10/16/2018  Chief complaint: irritation, OD  Onset: last night   Right is red and watery  Light sensitive  Blurred vision   No matting this AM  Has not used any eye drops         Last edited by Irineo Rendon MD on 1/8/2019  9:01 AM. (History)            Assessment /Plan     For exam results, see Encounter Report.      ICD-10-CM ICD-9-CM    1. Cornea ulcer, right H16.001 370.00 Aerobic culture     Culture OD taken today   Start Vigamox and Poly 1 drop every 1 hour, even during the night   Start Valtrex 1 gram TID X 10 days   RETURN TO CLINIC 1 day 8:30AM [facility]

## 2019-01-09 ENCOUNTER — CLINICAL SUPPORT (OUTPATIENT)
Dept: OTOLARYNGOLOGY | Facility: CLINIC | Age: 49
End: 2019-01-09
Payer: COMMERCIAL

## 2019-01-09 ENCOUNTER — OFFICE VISIT (OUTPATIENT)
Dept: OPHTHALMOLOGY | Facility: CLINIC | Age: 49
End: 2019-01-09
Payer: COMMERCIAL

## 2019-01-09 DIAGNOSIS — J30.9 ALLERGIC RHINITIS, UNSPECIFIED SEASONALITY, UNSPECIFIED TRIGGER: ICD-10-CM

## 2019-01-09 DIAGNOSIS — H16.001 CORNEA ULCER, RIGHT: Primary | ICD-10-CM

## 2019-01-09 PROCEDURE — 92012 INTRM OPH EXAM EST PATIENT: CPT | Mod: S$GLB,,, | Performed by: OPHTHALMOLOGY

## 2019-01-09 PROCEDURE — 95117 PR IMMU2THERAPY, 2+ INJECTIONS: ICD-10-PCS | Mod: S$GLB,,, | Performed by: OTOLARYNGOLOGY

## 2019-01-09 PROCEDURE — 92012 PR EYE EXAM, EST PATIENT,INTERMED: ICD-10-PCS | Mod: S$GLB,,, | Performed by: OPHTHALMOLOGY

## 2019-01-09 PROCEDURE — 99999 PR PBB SHADOW E&M-EST. PATIENT-LVL II: ICD-10-PCS | Mod: PBBFAC,,,

## 2019-01-09 PROCEDURE — 99999 PR PBB SHADOW E&M-EST. PATIENT-LVL II: CPT | Mod: PBBFAC,,, | Performed by: OPHTHALMOLOGY

## 2019-01-09 PROCEDURE — 99999 PR PBB SHADOW E&M-EST. PATIENT-LVL II: CPT | Mod: PBBFAC,,,

## 2019-01-09 PROCEDURE — 95117 IMMUNOTHERAPY INJECTIONS: CPT | Mod: S$GLB,,, | Performed by: OTOLARYNGOLOGY

## 2019-01-09 PROCEDURE — 99999 PR PBB SHADOW E&M-EST. PATIENT-LVL II: ICD-10-PCS | Mod: PBBFAC,,, | Performed by: OPHTHALMOLOGY

## 2019-01-09 NOTE — PROGRESS NOTES
HPI     Cornea Ulcer      Additional comments: 1 day recheck OD              Comments     The patient states his right eye is feeling much better than yesterday.    Pain scale today 3 and yesterday was 8-9.  100% drop compliance    1. Cornea Ulcer OD    Vigamox and Poly Every hour  Valtrex          Last edited by Irineo Rendon MD on 1/9/2019  8:55 AM. (History)            Assessment /Plan     For exam results, see Encounter Report.      ICD-10-CM ICD-9-CM    1. Cornea ulcer, right H16.001 370.00        Responding to tx and sxs improving of pain   No growth on culture today     RETURN TO CLINIC 2 days - unless sxs change pt will call tomorrow     Vigamox and Poly Every hour  Valtrex po

## 2019-01-09 NOTE — PROGRESS NOTES
Date of treatment initiation: 04/09/2015  Initial SNOT-20 score: 28  Date of last followup visit with referring physician: 06/10/2016  Date next followup visit is due:06/2017  Most recent SNOT-20 score:  0  Date SNOT-20 is due: January 2019    No Known Drug Allergies    Ordering Physician: Dr. Kurtz      MAINTENANCE VIALS - MANUFACTURED BY [employer]    Mix #1 - LOT# [id number]  EXP: 01/23/19  Allergens: trees  Mix #2 - LOT# [id number]  EXP: 01/23/19  Allergens: weeds and grassed  Mix #3 - LOT# [id number]  EXP: 01/23/19  Allergens: molds, mites, cockroach, jason, dog, feathers      Administered 0.5 mL of red maintenance vial -  mix #1 and #2 in left arm & mix #3 in right arm subcutaneously at 0920 manufactured by [employer]. Patient reports that he will wait in the building for 20 minutes after injection, he states that he will contact our office with any questions, concerns, or signs of a reaction.

## 2019-01-11 ENCOUNTER — OFFICE VISIT (OUTPATIENT)
Dept: OPHTHALMOLOGY | Facility: CLINIC | Age: 49
End: 2019-01-11
Payer: COMMERCIAL

## 2019-01-11 DIAGNOSIS — H16.001 CORNEA ULCER, RIGHT: Primary | ICD-10-CM

## 2019-01-11 LAB — BACTERIA SPEC AEROBE CULT: NO GROWTH

## 2019-01-11 PROCEDURE — 99999 PR PBB SHADOW E&M-EST. PATIENT-LVL II: ICD-10-PCS | Mod: PBBFAC,,, | Performed by: OPHTHALMOLOGY

## 2019-01-11 PROCEDURE — 99999 PR PBB SHADOW E&M-EST. PATIENT-LVL II: CPT | Mod: PBBFAC,,, | Performed by: OPHTHALMOLOGY

## 2019-01-11 PROCEDURE — 92012 INTRM OPH EXAM EST PATIENT: CPT | Mod: S$GLB,,, | Performed by: OPHTHALMOLOGY

## 2019-01-11 PROCEDURE — 92012 PR EYE EXAM, EST PATIENT,INTERMED: ICD-10-PCS | Mod: S$GLB,,, | Performed by: OPHTHALMOLOGY

## 2019-01-11 RX ORDER — PREDNISOLONE ACETATE 10 MG/ML
1 SUSPENSION/ DROPS OPHTHALMIC 3 TIMES DAILY
Qty: 5 ML | Refills: 1 | Status: SHIPPED | OUTPATIENT
Start: 2019-01-11 | End: 2019-04-10

## 2019-01-14 ENCOUNTER — OFFICE VISIT (OUTPATIENT)
Dept: OPHTHALMOLOGY | Facility: CLINIC | Age: 49
End: 2019-01-14
Payer: COMMERCIAL

## 2019-01-14 DIAGNOSIS — H16.001 CORNEA ULCER, RIGHT: Primary | ICD-10-CM

## 2019-01-14 PROCEDURE — 92012 INTRM OPH EXAM EST PATIENT: CPT | Mod: S$GLB,,, | Performed by: OPHTHALMOLOGY

## 2019-01-14 PROCEDURE — 92012 PR EYE EXAM, EST PATIENT,INTERMED: ICD-10-PCS | Mod: S$GLB,,, | Performed by: OPHTHALMOLOGY

## 2019-01-14 PROCEDURE — 99999 PR PBB SHADOW E&M-EST. PATIENT-LVL I: CPT | Mod: PBBFAC,,, | Performed by: OPHTHALMOLOGY

## 2019-01-14 PROCEDURE — 99999 PR PBB SHADOW E&M-EST. PATIENT-LVL I: ICD-10-PCS | Mod: PBBFAC,,, | Performed by: OPHTHALMOLOGY

## 2019-01-15 NOTE — PROGRESS NOTES
"Last 5 Patient Entered Readings                                      Current 30 Day Average: 139/84     Recent Readings 1/8/2019 1/7/2019 12/21/2018 12/10/2018 12/9/2018    SBP (mmHg) 138 141 138 134 135    DBP (mmHg) 82 85 85 75 74    Pulse 82 88 74 65 76          Digital Medicine: Health  Follow Up    Lifestyle Modifications:    1.Dietary Modifications (Sodium intake <2,000mg/day, food labels, dining out): Patient states he has been trying to eat better since the holidays.  States he brings a small salad, apple, and a small sandwich to work.  Has been cutting out all of the "junk food."    2.Physical Activity: Patient states he has not been exercising as much as he would like to.  States they recently moved to a new house, so has not had time to get back to an exercise routine.  Reports he will try to start in 1-2 weeks.    3.Medication Therapy: Patient has been compliant with the medication regimen.    4.Patient has the following medication side effects/concerns: none  (Frequency/Alleviating factors/Precipitating factors, etc.)     Follow up with Mr. Irineo Betts completed. No further questions or concerns. Will continue to follow up to achieve health goals.    Patient states they sold their house faster than expected, so have been busy moving, which is why he has not taken many BP readings.  States they are starting to get settled in the new house, so will get back to regular readings.  "

## 2019-01-18 ENCOUNTER — OFFICE VISIT (OUTPATIENT)
Dept: OPHTHALMOLOGY | Facility: CLINIC | Age: 49
End: 2019-01-18
Payer: COMMERCIAL

## 2019-01-18 DIAGNOSIS — H16.001 CORNEA ULCER, RIGHT: Primary | ICD-10-CM

## 2019-01-18 PROCEDURE — 92012 INTRM OPH EXAM EST PATIENT: CPT | Mod: S$GLB,,, | Performed by: OPHTHALMOLOGY

## 2019-01-18 PROCEDURE — 99999 PR PBB SHADOW E&M-EST. PATIENT-LVL II: ICD-10-PCS | Mod: PBBFAC,,, | Performed by: OPHTHALMOLOGY

## 2019-01-18 PROCEDURE — 92012 PR EYE EXAM, EST PATIENT,INTERMED: ICD-10-PCS | Mod: S$GLB,,, | Performed by: OPHTHALMOLOGY

## 2019-01-18 PROCEDURE — 99999 PR PBB SHADOW E&M-EST. PATIENT-LVL II: CPT | Mod: PBBFAC,,, | Performed by: OPHTHALMOLOGY

## 2019-01-18 NOTE — PROGRESS NOTES
HPI     K-ulcer      Additional comments: 4 day f/u K-ulcer OD, Pred A TID OD, Vigamox and   Poly every other hour              Comments     Pt states no problems since his last visit. Finished his valtrex pills   last night. No pain or irritation. Feels the eye is much better since his   last visit.     1. Cornea Ulcer OD    Pred A TID OD  Vigamox and Poly Every other hour  Valtrex          Last edited by Parker Rivera on 1/18/2019  8:25 AM. (History)            Assessment /Plan     For exam results, see Encounter Report.      ICD-10-CM ICD-9-CM    1. Cornea ulcer, right H16.001 370.00 Ulcer healed, infiltrate improving        Finished with valtrex  Pred A TID OD  Poly tid OD  vigamox tid OD      Return to clinic 1 week

## 2019-01-24 NOTE — PROGRESS NOTES
Last 5 Patient Entered Readings                                      Current 30 Day Average: 136/86     Recent Readings 1/21/2019 1/18/2019 1/8/2019 1/7/2019 12/21/2018    SBP (mmHg) 132 131 138 141 138    DBP (mmHg) 92 84 82 85 85    Pulse - - 82 88 74          Health  spoke with patient 1/15. He recently sold his house and moved. Trying to resume healthy lifestyle after holidays, including resuming exercise. BP previously closer to goal. Allow time for resumption of healthy lifestyle.

## 2019-01-28 ENCOUNTER — OFFICE VISIT (OUTPATIENT)
Dept: OPHTHALMOLOGY | Facility: CLINIC | Age: 49
End: 2019-01-28
Payer: COMMERCIAL

## 2019-01-28 DIAGNOSIS — H16.001 CORNEA ULCER, RIGHT: Primary | ICD-10-CM

## 2019-01-28 PROCEDURE — 99999 PR PBB SHADOW E&M-EST. PATIENT-LVL II: ICD-10-PCS | Mod: PBBFAC,,, | Performed by: OPHTHALMOLOGY

## 2019-01-28 PROCEDURE — 99999 PR PBB SHADOW E&M-EST. PATIENT-LVL II: CPT | Mod: PBBFAC,,, | Performed by: OPHTHALMOLOGY

## 2019-01-28 PROCEDURE — 92012 PR EYE EXAM, EST PATIENT,INTERMED: ICD-10-PCS | Mod: S$GLB,,, | Performed by: OPHTHALMOLOGY

## 2019-01-28 PROCEDURE — 92012 INTRM OPH EXAM EST PATIENT: CPT | Mod: S$GLB,,, | Performed by: OPHTHALMOLOGY

## 2019-01-28 RX ORDER — ASPIRIN 81 MG/1
81 TABLET ORAL DAILY
COMMUNITY

## 2019-01-28 NOTE — PROGRESS NOTES
HPI     Patient returns for a k ulcer recheck OD  Patient states OD  Is feeling   much better.    1. Cornea Ulcer OD    OD Pred A TID OD , VIG TID, POLY TID    Last edited by Irineo Rendon MD on 1/28/2019  8:32 AM. (History)            Assessment /Plan     For exam results, see Encounter Report.      ICD-10-CM ICD-9-CM    1. Cornea ulcer, right H16.001 370.00        RETURN TO CLINIC PRN   Taper over the next week   Stop Poly

## 2019-01-31 ENCOUNTER — CLINICAL SUPPORT (OUTPATIENT)
Dept: OTOLARYNGOLOGY | Facility: CLINIC | Age: 49
End: 2019-01-31
Payer: COMMERCIAL

## 2019-01-31 DIAGNOSIS — J30.9 ALLERGIC RHINITIS, UNSPECIFIED SEASONALITY, UNSPECIFIED TRIGGER: ICD-10-CM

## 2019-01-31 PROCEDURE — 95117 PR IMMU2THERAPY, 2+ INJECTIONS: ICD-10-PCS | Mod: S$GLB,,, | Performed by: OTOLARYNGOLOGY

## 2019-01-31 PROCEDURE — 99999 PR PBB SHADOW E&M-EST. PATIENT-LVL II: CPT | Mod: PBBFAC,,,

## 2019-01-31 PROCEDURE — 95117 IMMUNOTHERAPY INJECTIONS: CPT | Mod: S$GLB,,, | Performed by: OTOLARYNGOLOGY

## 2019-01-31 PROCEDURE — 99999 PR PBB SHADOW E&M-EST. PATIENT-LVL II: ICD-10-PCS | Mod: PBBFAC,,,

## 2019-01-31 NOTE — PROGRESS NOTES
Date of treatment initiation: 04/09/2015  Initial SNOT-20 score: 28  Date of last followup visit with referring physician: 06/10/2016  Date next followup visit is due:06/2017  Most recent SNOT-20 score:  0  Date SNOT-20 is due: January 2019    No Known Drug Allergies    Ordering Physician: Dr. Kurtz      MAINTENANCE VIALS - MANUFACTURED BY [employer]    Mix #1 - LOT# [id number]  EXP: 01/23/19  Allergens: trees  Mix #2 - LOT# [id number]  EXP: 01/23/19  Allergens: weeds and grassed  Mix #3 - LOT# [id number]  EXP: 01/23/19  Allergens: molds, mites, cockroach, jason, dog, feathers      Administered 0.5 mL of red maintenance vial -  mix #1 and #2 in left arm & mix #3 in right arm subcutaneously at 0920 manufactured by [employer]. Patient reports that he will wait in the building for 20 minutes after injection, he states that he will contact our office with any questions, concerns, or signs of a reaction.

## 2019-02-07 ENCOUNTER — PATIENT OUTREACH (OUTPATIENT)
Dept: OTHER | Facility: OTHER | Age: 49
End: 2019-02-07

## 2019-02-07 NOTE — PROGRESS NOTES
Last 5 Patient Entered Readings                                      Current 30 Day Average: 134/85     Recent Readings 2/1/2019 1/29/2019 1/29/2019 1/25/2019 1/21/2019    SBP (mmHg) 125 139 123 137 132    DBP (mmHg) 82 84 84 87 92    Pulse 82 75 80 79 -          HPI:  Called patient to follow up. Patient endorses adherence to medication regimen. Reports he has been working on increasing exercise since health  outreach. Patient denies hypotensive s/sx (lightheadedness, dizziness, nausea, fatigue); patient denies hypertensive s/sx (SOB, CP, severe headaches, changes in vision).     Assessment:  Reviewed recent readings. Per 2017 ACC/ AHA HTN guidelines (goal of BP < 130/80), current 30-day average needs to be addressed more thoroughly today. Readings on downward trend.    Plan:  Continue current medication regimen. Encouraged to continue with increasing exercise. I will continue to monitor regularly and will follow-up in 3 to 4 weeks, sooner if blood pressure begins to trend upward or downward.     Current medication regimen:  Hypertension Medications             chlorthalidone (HYGROTEN) 25 MG Tab Take 1 tablet by mouth daily for blood pressure    losartan (COZAAR) 50 MG tablet Take 1 tablet (50 mg total) by mouth 2 (two) times daily. (for blood pressure)          Patient denies having questions or concerns. Patient has my contact information and knows to call with any concerns or clinical changes.

## 2019-02-14 ENCOUNTER — CLINICAL SUPPORT (OUTPATIENT)
Dept: OTOLARYNGOLOGY | Facility: CLINIC | Age: 49
End: 2019-02-14
Payer: COMMERCIAL

## 2019-02-14 DIAGNOSIS — J30.9 ALLERGIC RHINITIS, UNSPECIFIED SEASONALITY, UNSPECIFIED TRIGGER: ICD-10-CM

## 2019-02-14 PROCEDURE — 99999 PR PBB SHADOW E&M-EST. PATIENT-LVL II: CPT | Mod: PBBFAC,,,

## 2019-02-14 PROCEDURE — 99999 PR PBB SHADOW E&M-EST. PATIENT-LVL II: ICD-10-PCS | Mod: PBBFAC,,,

## 2019-02-14 PROCEDURE — 95117 PR IMMU2THERAPY, 2+ INJECTIONS: ICD-10-PCS | Mod: S$GLB,,, | Performed by: OTOLARYNGOLOGY

## 2019-02-14 PROCEDURE — 95117 IMMUNOTHERAPY INJECTIONS: CPT | Mod: S$GLB,,, | Performed by: OTOLARYNGOLOGY

## 2019-02-14 NOTE — PROGRESS NOTES
Date of treatment initiation: 04/09/2015  Initial SNOT-20 score: 28  Date of last followup visit with referring physician: 06/10/2016  Date next followup visit is due:06/2017  Most recent SNOT-20 score:  0  Date SNOT-20 is due: January 2019    No Known Drug Allergies    Ordering Physician: Dr. Kurtz      MAINTENANCE VIALS - MANUFACTURED BY [employer]    Mix #1 - LOT# [id number]  Allergens: trees  Mix #2 - LOT# [id number]  Allergens: weeds and grassed  Mix #3 - LOT# [id number]  Allergens: molds, mites, cockroach, jason, dog, feathers      Administered 0.5 mL of red maintenance vial -  mix #1 and #2 in left arm & mix #3 in right arm subcutaneously at 1020 manufactured by [email or web address]. Patient reports that he will wait in the building for 20 minutes after injection, he states that he will contact our office with any questions, concerns, or signs of a reaction.

## 2019-02-20 ENCOUNTER — PATIENT OUTREACH (OUTPATIENT)
Dept: OTHER | Facility: OTHER | Age: 49
End: 2019-02-20

## 2019-02-20 NOTE — PROGRESS NOTES
Last 5 Patient Entered Readings                                      Current 30 Day Average: 130/86     Recent Readings 2/13/2019 2/1/2019 1/29/2019 1/29/2019 1/25/2019    SBP (mmHg) 117 125 139 123 137    DBP (mmHg) 84 82 84 84 87    Pulse 71 82 75 80 79          2/20: LVM.  Will call in 2 weeks.  Patient close to goal.  Will follow up about increasing exercise.

## 2019-02-21 ENCOUNTER — OFFICE VISIT (OUTPATIENT)
Dept: PSYCHIATRY | Facility: CLINIC | Age: 49
End: 2019-02-21
Payer: COMMERCIAL

## 2019-02-21 DIAGNOSIS — F48.9 DEFERRED DIAGNOSIS ON AXIS I: Primary | ICD-10-CM

## 2019-02-21 PROCEDURE — 90834 PSYTX W PT 45 MINUTES: CPT | Mod: S$GLB,,, | Performed by: SOCIAL WORKER

## 2019-02-21 PROCEDURE — 90834 PR PSYCHOTHERAPY W/PATIENT, 45 MIN: ICD-10-PCS | Mod: S$GLB,,, | Performed by: SOCIAL WORKER

## 2019-03-04 RX ORDER — MOXIFLOXACIN 5 MG/ML
1 SOLUTION/ DROPS OPHTHALMIC
Qty: 5 ML | Refills: 3 | OUTPATIENT
Start: 2019-03-04

## 2019-03-06 NOTE — PROGRESS NOTES
Last 5 Patient Entered Readings                                      Current 30 Day Average: 122/84     Recent Readings 3/1/2019 2/23/2019 2/13/2019 2/1/2019 1/29/2019    SBP (mmHg) 127 122 117 125 139    DBP (mmHg) 84 83 84 82 84    Pulse 80 80 71 82 75          Digital Medicine: Health  Follow Up    Lifestyle Modifications:    1.Dietary Modifications (Sodium intake <2,000mg/day, food labels, dining out): States he has been trying to work on reducing salt intake.  States he has been checking food labels and looking for items with less than 500mg.  States he eats a small salad for lunch and noticed it has 800mg of sodium.  States there is chicken and cheese and a prepackaged dressing.  Encouraged patient to be mindful and try to use less of the dressing to cut back on salt.  Patient will try to reduce sodium intake.    2.Physical Activity: Reports he is still going on walks and trying to walk around at his office.  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  States he tries to walk on the weekends as well.  Will set [facility] next encounter.    3.Medication Therapy: Patient has been compliant with the medication regimen.    4.Patient has the following medication side effects/concerns: none  (Frequency/Alleviating factors/Precipitating factors, etc.)     Follow up with Mr. Irineo Betts completed. No further questions or concerns. Will continue to follow up to achieve health goals.

## 2019-03-13 ENCOUNTER — CLINICAL SUPPORT (OUTPATIENT)
Dept: OTOLARYNGOLOGY | Facility: CLINIC | Age: 49
End: 2019-03-13
Payer: COMMERCIAL

## 2019-03-13 DIAGNOSIS — J30.9 ALLERGIC RHINITIS, UNSPECIFIED SEASONALITY, UNSPECIFIED TRIGGER: ICD-10-CM

## 2019-03-13 PROCEDURE — 99999 PR PBB SHADOW E&M-EST. PATIENT-LVL II: ICD-10-PCS | Mod: PBBFAC,,,

## 2019-03-13 PROCEDURE — 99999 PR PBB SHADOW E&M-EST. PATIENT-LVL II: CPT | Mod: PBBFAC,,,

## 2019-03-13 PROCEDURE — 95117 PR IMMU2THERAPY, 2+ INJECTIONS: ICD-10-PCS | Mod: S$GLB,,, | Performed by: OTOLARYNGOLOGY

## 2019-03-13 PROCEDURE — 95117 IMMUNOTHERAPY INJECTIONS: CPT | Mod: S$GLB,,, | Performed by: OTOLARYNGOLOGY

## 2019-03-13 NOTE — PROGRESS NOTES
Date of treatment initiation: 04/09/2015  Initial SNOT-20 score: 28  Date of last followup visit with referring physician: 06/10/2016  Date next followup visit is due:06/2017  Most recent SNOT-20 score:  0    No Known Drug Allergies    Ordering Physician: Dr. Kurtz      MAINTENANCE VIALS - MANUFACTURED BY [facility]    Mix #1 - LOT# [id number]  Allergens: trees  Mix #2 - LOT# [id number]  Allergens: weeds and grasses  Mix #3 - LOT# [id number]  Allergens: molds, mites, cockroach, jason, dog, feathers      Administered 0.05 mL of red therapeutic vial -  mix #1 and #2 in left arm & mix #3 in right arm subcutaneously at 1105 manufactured by [employer]. Patient reports that he will wait in the building for 20 minutes after injection, he states that he will contact our office with any questions, concerns, or signs of a reaction.

## 2019-03-19 ENCOUNTER — OFFICE VISIT (OUTPATIENT)
Dept: PSYCHIATRY | Facility: CLINIC | Age: 49
End: 2019-03-19
Payer: COMMERCIAL

## 2019-03-19 ENCOUNTER — CLINICAL SUPPORT (OUTPATIENT)
Dept: OTOLARYNGOLOGY | Facility: CLINIC | Age: 49
End: 2019-03-19
Payer: COMMERCIAL

## 2019-03-19 DIAGNOSIS — F48.9 DEFERRED DIAGNOSIS ON AXIS I: Primary | ICD-10-CM

## 2019-03-19 DIAGNOSIS — J30.9 ALLERGIC RHINITIS, UNSPECIFIED SEASONALITY, UNSPECIFIED TRIGGER: ICD-10-CM

## 2019-03-19 PROCEDURE — 90834 PR PSYCHOTHERAPY W/PATIENT, 45 MIN: ICD-10-PCS | Mod: S$GLB,,, | Performed by: SOCIAL WORKER

## 2019-03-19 PROCEDURE — 95117 PR IMMU2THERAPY, 2+ INJECTIONS: ICD-10-PCS | Mod: S$GLB,,, | Performed by: OTOLARYNGOLOGY

## 2019-03-19 PROCEDURE — 99999 PR PBB SHADOW E&M-EST. PATIENT-LVL II: CPT | Mod: PBBFAC,,,

## 2019-03-19 PROCEDURE — 90834 PSYTX W PT 45 MINUTES: CPT | Mod: S$GLB,,, | Performed by: SOCIAL WORKER

## 2019-03-19 PROCEDURE — 95117 IMMUNOTHERAPY INJECTIONS: CPT | Mod: S$GLB,,, | Performed by: OTOLARYNGOLOGY

## 2019-03-19 PROCEDURE — 99999 PR PBB SHADOW E&M-EST. PATIENT-LVL II: ICD-10-PCS | Mod: PBBFAC,,,

## 2019-03-19 NOTE — PROGRESS NOTES
Date of treatment initiation: 04/09/2015  Initial SNOT-20 score: 28  Date of last followup visit with referring physician: 06/10/2016  Date next followup visit is due:06/2017  Most recent SNOT-20 score:  0    No Known Drug Allergies    Ordering Physician: Dr. Kurtz      MAINTENANCE VIALS - MANUFACTURED BY [facility]    Mix #1 - LOT# [id number]  Allergens: trees  Mix #2 - LOT# [id number]  Allergens: weeds and grasses  Mix #3 - LOT# [id number]  Allergens: molds, mites, cockroach, jason, dog, feathers      Administered 0.1 mL of red therapeutic vial -  mix #1 and #2 in left arm & mix #3 in right arm subcutaneously at 0910 manufactured by [employer]. Patient reports that he will wait in the building for 20 minutes after injection, he states that he will contact our office with any questions, concerns, or signs of a reaction.

## 2019-04-02 ENCOUNTER — LAB VISIT (OUTPATIENT)
Dept: LAB | Facility: HOSPITAL | Age: 49
End: 2019-04-02
Attending: PEDIATRICS
Payer: COMMERCIAL

## 2019-04-02 ENCOUNTER — OFFICE VISIT (OUTPATIENT)
Dept: PSYCHIATRY | Facility: CLINIC | Age: 49
End: 2019-04-02
Payer: COMMERCIAL

## 2019-04-02 ENCOUNTER — CLINICAL SUPPORT (OUTPATIENT)
Dept: OTOLARYNGOLOGY | Facility: CLINIC | Age: 49
End: 2019-04-02
Payer: COMMERCIAL

## 2019-04-02 DIAGNOSIS — Z00.00 WELL ADULT EXAM: ICD-10-CM

## 2019-04-02 DIAGNOSIS — F48.9 DEFERRED DIAGNOSIS ON AXIS I: Primary | ICD-10-CM

## 2019-04-02 DIAGNOSIS — J30.9 ALLERGIC RHINITIS, UNSPECIFIED SEASONALITY, UNSPECIFIED TRIGGER: ICD-10-CM

## 2019-04-02 LAB
CHOLEST SERPL-MCNC: 202 MG/DL (ref 120–199)
CHOLEST/HDLC SERPL: 4.4 {RATIO} (ref 2–5)
HDLC SERPL-MCNC: 46 MG/DL (ref 40–75)
HDLC SERPL: 22.8 % (ref 20–50)
LDLC SERPL CALC-MCNC: 138 MG/DL (ref 63–159)
NONHDLC SERPL-MCNC: 156 MG/DL
TRIGL SERPL-MCNC: 90 MG/DL (ref 30–150)

## 2019-04-02 PROCEDURE — 90834 PSYTX W PT 45 MINUTES: CPT | Mod: S$GLB,,, | Performed by: SOCIAL WORKER

## 2019-04-02 PROCEDURE — 80061 LIPID PANEL: CPT

## 2019-04-02 PROCEDURE — 36415 COLL VENOUS BLD VENIPUNCTURE: CPT

## 2019-04-02 PROCEDURE — 99999 PR PBB SHADOW E&M-EST. PATIENT-LVL II: CPT | Mod: PBBFAC,,,

## 2019-04-02 PROCEDURE — 95117 PR IMMU2THERAPY, 2+ INJECTIONS: ICD-10-PCS | Mod: S$GLB,,, | Performed by: OTOLARYNGOLOGY

## 2019-04-02 PROCEDURE — 90834 PR PSYCHOTHERAPY W/PATIENT, 45 MIN: ICD-10-PCS | Mod: S$GLB,,, | Performed by: SOCIAL WORKER

## 2019-04-02 PROCEDURE — 99999 PR PBB SHADOW E&M-EST. PATIENT-LVL II: ICD-10-PCS | Mod: PBBFAC,,,

## 2019-04-02 PROCEDURE — 95117 IMMUNOTHERAPY INJECTIONS: CPT | Mod: S$GLB,,, | Performed by: OTOLARYNGOLOGY

## 2019-04-02 NOTE — PROGRESS NOTES
Date of treatment initiation: 04/09/2015  Initial SNOT-20 score: 28  Date of last followup visit with referring physician: 06/10/2016  Date next followup visit is due:06/2017  Most recent SNOT-20 score:  0    No Known Drug Allergies    Ordering Physician: Dr. Kurtz      MAINTENANCE VIALS - MANUFACTURED BY [facility]    Mix #1 - LOT# [id number]  Allergens: trees  Mix #2 - LOT# [id number]  Allergens: weeds and grasses  Mix #3 - LOT# [id number]  Allergens: molds, mites, cockroach, jason, dog, feathers      Administered 0.15 mL of red therapeutic vial -  mix #1 and #2 in left arm & mix #3 in right arm subcutaneously at 0905 manufactured by [employer]. Patient reports that he will wait in the building for 20 minutes after injection, he states that he will contact our office with any questions, concerns, or signs of a reaction.

## 2019-04-03 ENCOUNTER — PATIENT OUTREACH (OUTPATIENT)
Dept: OTHER | Facility: OTHER | Age: 49
End: 2019-04-03

## 2019-04-03 NOTE — PROGRESS NOTES
Last 5 Patient Entered Readings                                      Current 30 Day Average: 122/84     Recent Readings 4/2/2019 3/18/2019 3/1/2019 2/23/2019 2/13/2019    SBP (mmHg) 113 131 127 122 117    DBP (mmHg) 78 89 84 83 84    Pulse 72 75 80 80 71          4/3: LVM.  Will call in 2 weeks.

## 2019-04-04 ENCOUNTER — PATIENT MESSAGE (OUTPATIENT)
Dept: OTOLARYNGOLOGY | Facility: CLINIC | Age: 49
End: 2019-04-04

## 2019-04-10 ENCOUNTER — PATIENT MESSAGE (OUTPATIENT)
Dept: PSYCHIATRY | Facility: CLINIC | Age: 49
End: 2019-04-10

## 2019-04-10 ENCOUNTER — OFFICE VISIT (OUTPATIENT)
Dept: INTERNAL MEDICINE | Facility: CLINIC | Age: 49
End: 2019-04-10
Payer: COMMERCIAL

## 2019-04-10 ENCOUNTER — TELEPHONE (OUTPATIENT)
Dept: INTERNAL MEDICINE | Facility: CLINIC | Age: 49
End: 2019-04-10

## 2019-04-10 VITALS
TEMPERATURE: 97 F | DIASTOLIC BLOOD PRESSURE: 82 MMHG | SYSTOLIC BLOOD PRESSURE: 110 MMHG | BODY MASS INDEX: 29.62 KG/M2 | WEIGHT: 218.69 LBS | HEIGHT: 72 IN | HEART RATE: 66 BPM | OXYGEN SATURATION: 98 %

## 2019-04-10 DIAGNOSIS — I10 ESSENTIAL HYPERTENSION: ICD-10-CM

## 2019-04-10 DIAGNOSIS — F43.21 GRIEF AT LOSS OF CHILD: ICD-10-CM

## 2019-04-10 DIAGNOSIS — Z00.00 WELL ADULT EXAM: Primary | ICD-10-CM

## 2019-04-10 DIAGNOSIS — Z63.4 GRIEF AT LOSS OF CHILD: ICD-10-CM

## 2019-04-10 DIAGNOSIS — E78.00 PURE HYPERCHOLESTEROLEMIA: ICD-10-CM

## 2019-04-10 PROCEDURE — 3074F PR MOST RECENT SYSTOLIC BLOOD PRESSURE < 130 MM HG: ICD-10-PCS | Mod: CPTII,S$GLB,, | Performed by: PEDIATRICS

## 2019-04-10 PROCEDURE — 99999 PR PBB SHADOW E&M-EST. PATIENT-LVL III: ICD-10-PCS | Mod: PBBFAC,,, | Performed by: PEDIATRICS

## 2019-04-10 PROCEDURE — 99396 PR PREVENTIVE VISIT,EST,40-64: ICD-10-PCS | Mod: S$GLB,,, | Performed by: PEDIATRICS

## 2019-04-10 PROCEDURE — 3074F SYST BP LT 130 MM HG: CPT | Mod: CPTII,S$GLB,, | Performed by: PEDIATRICS

## 2019-04-10 PROCEDURE — 3079F DIAST BP 80-89 MM HG: CPT | Mod: CPTII,S$GLB,, | Performed by: PEDIATRICS

## 2019-04-10 PROCEDURE — 3079F PR MOST RECENT DIASTOLIC BLOOD PRESSURE 80-89 MM HG: ICD-10-PCS | Mod: CPTII,S$GLB,, | Performed by: PEDIATRICS

## 2019-04-10 PROCEDURE — 99999 PR PBB SHADOW E&M-EST. PATIENT-LVL III: CPT | Mod: PBBFAC,,, | Performed by: PEDIATRICS

## 2019-04-10 PROCEDURE — 99396 PREV VISIT EST AGE 40-64: CPT | Mod: S$GLB,,, | Performed by: PEDIATRICS

## 2019-04-10 RX ORDER — SERTRALINE HYDROCHLORIDE 50 MG/1
50 TABLET, FILM COATED ORAL DAILY
Qty: 30 TABLET | Refills: 11 | Status: SHIPPED | OUTPATIENT
Start: 2019-04-10 | End: 2020-03-16 | Stop reason: SDUPTHER

## 2019-04-10 SDOH — SOCIAL DETERMINANTS OF HEALTH (SDOH): DISSAPEARANCE AND DEATH OF FAMILY MEMBER: Z63.4

## 2019-04-10 NOTE — TELEPHONE ENCOUNTER
Pt advised per Dr. Hernández after he s/w pt's therapist GIANLUCA Vargas Dr. sent rx to clinic pharmacy for Sertraline 50mg one tablet daily, and after taking rx can increase to 100mg once daily rx at pt's request. Pt voiced understanding and to call back PRN.

## 2019-04-10 NOTE — TELEPHONE ENCOUNTER
Refill request sent to provider for authorization [Chlorthalidone].    LV 04/10/2019  [place] 04/15/2020

## 2019-04-10 NOTE — PROGRESS NOTES
Subjective:       Patient ID: Irineo Betts is a 49 y.o. male.    Chief Complaint: Annual Exam and Results    Annual    PMH/PSH/SH/FH reviewed    HTN: on ARB, doing well, In [facility], B/P normal  Anxiety: on trazodone and sleeping well. In counseling  Lipids: working on D&E, ASCVD has reduced to 3.2 %    LABS REVIEWED AND DISCUSSED WITH PATIENT    Review of Systems   Constitutional: Negative for fever and unexpected weight change.   HENT: Negative for congestion and rhinorrhea.    Eyes: Negative for discharge and redness.   Respiratory: Negative for cough and wheezing.    Cardiovascular: Negative for chest pain, palpitations and leg swelling.   Gastrointestinal: Negative for abdominal pain, constipation, diarrhea and vomiting.   Endocrine: Negative for polydipsia, polyphagia and polyuria.   Genitourinary: Negative for decreased urine volume and difficulty urinating.   Musculoskeletal: Negative for arthralgias and joint swelling.   Skin: Negative for rash and wound.   Neurological: Negative for syncope and headaches.   Psychiatric/Behavioral: Positive for dysphoric mood and sleep disturbance. Negative for behavioral problems, self-injury and suicidal ideas. The patient is nervous/anxious.         Overall improved       Objective:      Physical Exam   Constitutional: He is oriented to person, place, and time. He appears well-developed and well-nourished. No distress.   Neck: No JVD present. No thyromegaly present.   Cardiovascular: Normal rate, regular rhythm and normal heart sounds.   No murmur heard.  Pulmonary/Chest: Effort normal and breath sounds normal. No respiratory distress. He has no wheezes. He has no rales.   Abdominal: Soft. He exhibits no distension and no mass. There is no tenderness. There is no guarding.   Musculoskeletal: He exhibits no edema.   Lymphadenopathy:     He has no cervical adenopathy.   Neurological: He is alert and oriented to person, place, and time. No cranial nerve  deficit. Coordination normal.   Skin: Capillary refill takes less than 2 seconds. No rash noted.   Psychiatric: He has a normal mood and affect. His behavior is normal. Judgment and thought content normal.       Assessment:     wellness in a patient with  1. Essential hypertension    2. Pure hypercholesterolemia    3. Grief at loss of child        Plan:       Essential hypertension  -     Basic metabolic panel; Future; Expected date: 04/10/2019    Pure hypercholesterolemia  -     AST (SGOT); Future; Expected date: 04/10/2019  -     ALT (SGPT); Future; Expected date: 04/10/2019  -     Lipid panel; Future; Expected date: 04/10/2019    Grief at loss of child    HM issues reviewed. He will maintain meds and counseling, if he and counselor think can add low dose SSRI. We discussed his lipids and as ASCVD risk is <7%, he elects to not start statin and continue D&E. F/U yearly.     I spoke with counselor, will start sertraline at 50 and move up to 100 mg if needed.

## 2019-04-11 RX ORDER — CHLORTHALIDONE 25 MG/1
TABLET ORAL
Qty: 90 TABLET | Refills: 3 | Status: SHIPPED | OUTPATIENT
Start: 2019-04-11 | End: 2020-04-08 | Stop reason: SDUPTHER

## 2019-04-17 NOTE — PROGRESS NOTES
Last 5 Patient Entered Readings                                      Current 30 Day Average: 119/82     Recent Readings 4/11/2019 4/4/2019 4/2/2019 3/18/2019 3/1/2019    SBP (mmHg) 116 114 113 131 127    DBP (mmHg) 79 82 78 89 84    Pulse 81 87 72 75 80          4/17: LVM.  Will call in 4 weeks.  Patient close to goal.

## 2019-04-24 ENCOUNTER — CLINICAL SUPPORT (OUTPATIENT)
Dept: OTOLARYNGOLOGY | Facility: CLINIC | Age: 49
End: 2019-04-24
Payer: COMMERCIAL

## 2019-04-24 DIAGNOSIS — J30.9 ALLERGIC RHINITIS, UNSPECIFIED SEASONALITY, UNSPECIFIED TRIGGER: ICD-10-CM

## 2019-04-24 PROCEDURE — 95117 PR IMMU2THERAPY, 2+ INJECTIONS: ICD-10-PCS | Mod: S$GLB,,, | Performed by: OTOLARYNGOLOGY

## 2019-04-24 PROCEDURE — 99999 PR PBB SHADOW E&M-EST. PATIENT-LVL II: CPT | Mod: PBBFAC,,,

## 2019-04-24 PROCEDURE — 95117 IMMUNOTHERAPY INJECTIONS: CPT | Mod: S$GLB,,, | Performed by: OTOLARYNGOLOGY

## 2019-04-24 PROCEDURE — 99999 PR PBB SHADOW E&M-EST. PATIENT-LVL II: ICD-10-PCS | Mod: PBBFAC,,,

## 2019-04-24 NOTE — PROGRESS NOTES
Date of treatment initiation: 04/09/2015  Initial SNOT-20 score: 28  Date of last followup visit with referring physician: 06/10/2016  Date next followup visit is due:06/2017  Most recent SNOT-20 score:  0    No Known Drug Allergies    Ordering Physician: Dr. Kurtz      MAINTENANCE VIALS - MANUFACTURED BY [facility]    Mix #1 - LOT# [id number]  Allergens: trees  Mix #2 - LOT# [id number]  Allergens: weeds and grasses  Mix #3 - LOT# [id number]  Allergens: molds, mites, cockroach, jason, dog, feathers      Administered 0.2 mL of red therapeutic vial -  mix #1 and #2 in left arm & mix #3 in right arm subcutaneously at 1020 manufactured by [employer]. Patient reports that he will wait in the building for 20 minutes after injection, he states that he will contact our office with any questions, concerns, or signs of a reaction.

## 2019-04-29 ENCOUNTER — PATIENT OUTREACH (OUTPATIENT)
Dept: OTHER | Facility: OTHER | Age: 49
End: 2019-04-29

## 2019-04-29 NOTE — PROGRESS NOTES
Last 5 Patient Entered Readings                                      Current 30 Day Average: 117/78     Recent Readings 4/22/2019 4/18/2019 4/11/2019 4/4/2019 4/2/2019    SBP (mmHg) 114 128 116 114 113    DBP (mmHg) 73 77 79 82 78    Pulse 81 67 81 87 72          HPI:  Called patient to follow up. Patient endorses adherence to medication regimen. Patient denies hypotensive s/sx (lightheadedness, dizziness, nausea, fatigue); patient denies hypertensive s/sx (SOB, CP, severe headaches, changes in vision).     Assessment:  Reviewed recent readings. Per 2017 ACC/ AHA HTN guidelines (goal of BP < 130/80), current 30-day average is well controlled.     Plan:  Continue current medication regimen. I will continue to monitor regularly and will follow-up in 6 to 8 weeks, sooner if blood pressure begins to trend upward or downward.     Current medication regimen:  Hypertension Medications             chlorthalidone (HYGROTEN) 25 MG Tab Take 1 tablet by mouth daily for blood pressure    losartan (COZAAR) 50 MG tablet Take 1 tablet (50 mg total) by mouth 2 (two) times daily. (for blood pressure)          Patient denies having questions or concerns. Patient has my contact information and knows to call with any concerns or clinical changes.

## 2019-05-09 ENCOUNTER — CLINICAL SUPPORT (OUTPATIENT)
Dept: OTOLARYNGOLOGY | Facility: CLINIC | Age: 49
End: 2019-05-09
Payer: COMMERCIAL

## 2019-05-09 DIAGNOSIS — J30.9 ALLERGIC RHINITIS, UNSPECIFIED SEASONALITY, UNSPECIFIED TRIGGER: ICD-10-CM

## 2019-05-09 PROCEDURE — 95117 PR IMMU2THERAPY, 2+ INJECTIONS: ICD-10-PCS | Mod: S$GLB,,, | Performed by: OTOLARYNGOLOGY

## 2019-05-09 PROCEDURE — 99999 PR PBB SHADOW E&M-EST. PATIENT-LVL II: ICD-10-PCS | Mod: PBBFAC,,,

## 2019-05-09 PROCEDURE — 95117 IMMUNOTHERAPY INJECTIONS: CPT | Mod: S$GLB,,, | Performed by: OTOLARYNGOLOGY

## 2019-05-09 PROCEDURE — 99999 PR PBB SHADOW E&M-EST. PATIENT-LVL II: CPT | Mod: PBBFAC,,,

## 2019-05-09 NOTE — PROGRESS NOTES
Date of treatment initiation: 04/09/2015  Initial SNOT-20 score: 28  Date of last followup visit with referring physician: 06/10/2016  Date next followup visit is due:06/2017  Most recent SNOT-20 score:  0    No Known Drug Allergies    Ordering Physician: Dr. Kurtz      MAINTENANCE VIALS - MANUFACTURED BY [facility]    Mix #1 - LOT# [id number]  Allergens: trees  Mix #2 - LOT# [id number]  Allergens: weeds and grasses  Mix #3 - LOT# [id number]  Allergens: molds, mites, cockroach, jason, dog, feathers      Administered 0.25 mL of red therapeutic vial -  mix #1 and #2 in left arm & mix #3 in right arm subcutaneously at 1120 manufactured by [employer]. Patient reports that he will wait in the building for 20 minutes after injection, he states that he will contact our office with any questions, concerns, or signs of a reaction.

## 2019-05-13 ENCOUNTER — OFFICE VISIT (OUTPATIENT)
Dept: PSYCHIATRY | Facility: CLINIC | Age: 49
End: 2019-05-13
Payer: COMMERCIAL

## 2019-05-13 DIAGNOSIS — F48.9 DEFERRED DIAGNOSIS ON AXIS I: Primary | ICD-10-CM

## 2019-05-13 PROCEDURE — 90834 PSYTX W PT 45 MINUTES: CPT | Mod: S$GLB,,, | Performed by: SOCIAL WORKER

## 2019-05-13 PROCEDURE — 90834 PR PSYCHOTHERAPY W/PATIENT, 45 MIN: ICD-10-PCS | Mod: S$GLB,,, | Performed by: SOCIAL WORKER

## 2019-05-15 NOTE — PROGRESS NOTES
Last 5 Patient Entered Readings                                      Current 30 Day Average: 119/76     Recent Readings 5/9/2019 4/22/2019 4/18/2019 4/11/2019 4/4/2019    SBP (mmHg) 116 114 128 116 114    DBP (mmHg) 78 73 77 79 82    Pulse 73 81 67 81 87          5/15: LVM.  Dyllan.  Will call in 3 weeks.

## 2019-05-21 DIAGNOSIS — I10 ESSENTIAL HYPERTENSION: ICD-10-CM

## 2019-05-21 RX ORDER — LOSARTAN POTASSIUM 50 MG/1
50 TABLET ORAL 2 TIMES DAILY
Qty: 180 TABLET | Refills: 3 | Status: SHIPPED | OUTPATIENT
Start: 2019-05-21 | End: 2020-06-01 | Stop reason: SDUPTHER

## 2019-05-30 ENCOUNTER — CLINICAL SUPPORT (OUTPATIENT)
Dept: OTOLARYNGOLOGY | Facility: CLINIC | Age: 49
End: 2019-05-30
Payer: COMMERCIAL

## 2019-05-30 DIAGNOSIS — J30.9 ALLERGIC RHINITIS, UNSPECIFIED SEASONALITY, UNSPECIFIED TRIGGER: ICD-10-CM

## 2019-05-30 PROCEDURE — 95117 IMMUNOTHERAPY INJECTIONS: CPT | Mod: S$GLB,,, | Performed by: OTOLARYNGOLOGY

## 2019-05-30 PROCEDURE — 95117 PR IMMU2THERAPY, 2+ INJECTIONS: ICD-10-PCS | Mod: S$GLB,,, | Performed by: OTOLARYNGOLOGY

## 2019-05-30 PROCEDURE — 99999 PR PBB SHADOW E&M-EST. PATIENT-LVL II: ICD-10-PCS | Mod: PBBFAC,,,

## 2019-05-30 PROCEDURE — 99999 PR PBB SHADOW E&M-EST. PATIENT-LVL II: CPT | Mod: PBBFAC,,,

## 2019-06-04 DIAGNOSIS — F41.9 ANXIETY: ICD-10-CM

## 2019-06-04 DIAGNOSIS — F51.04 PSYCHOPHYSIOLOGICAL INSOMNIA: ICD-10-CM

## 2019-06-04 RX ORDER — TRAZODONE HYDROCHLORIDE 100 MG/1
100 TABLET ORAL NIGHTLY
Qty: 30 TABLET | Refills: 11 | Status: SHIPPED | OUTPATIENT
Start: 2019-06-04 | End: 2020-06-01 | Stop reason: SDUPTHER

## 2019-06-04 NOTE — PROGRESS NOTES
Date of treatment initiation: 04/09/2015  Initial SNOT-20 score: 28  Date of last followup visit with referring physician: 06/10/2016  Date next followup visit is due:06/2017  Most recent SNOT-20 score:  0    No Known Drug Allergies    Ordering Physician: Dr. Kurtz      MAINTENANCE VIALS - MANUFACTURED BY [facility]    Mix #1 - LOT# [id number]  Allergens: trees  Mix #2 - LOT# [id number]  Allergens: weeds and grasses  Mix #3 - LOT# [id number]  Allergens: molds, mites, cockroach, jason, dog, feathers      Administered 0.3 mL of red therapeutic vial -  mix #1 and #2 in left arm & mix #3 in right arm subcutaneously at 1025 manufactured by [employer]. Patient reports that he will wait in the building for 20 minutes after injection, he states that he will contact our office with any questions, concerns, or signs of a reaction.

## 2019-06-05 NOTE — PROGRESS NOTES
Last 5 Patient Entered Readings                                      Current 30 Day Average: 114/76     Recent Readings 5/30/2019 5/23/2019 5/16/2019 5/9/2019 4/22/2019    SBP (mmHg) 109 123 109 116 114    DBP (mmHg) 74 78 73 78 73    Pulse 73 78 82 73 81          6/5: LVM.  Will call in 4 weeks.

## 2019-06-06 ENCOUNTER — PATIENT MESSAGE (OUTPATIENT)
Dept: ADMINISTRATIVE | Facility: OTHER | Age: 49
End: 2019-06-06

## 2019-06-14 ENCOUNTER — PATIENT MESSAGE (OUTPATIENT)
Dept: OTOLARYNGOLOGY | Facility: CLINIC | Age: 49
End: 2019-06-14

## 2019-06-14 RX ORDER — FLUTICASONE PROPIONATE 50 MCG
2 SPRAY, SUSPENSION (ML) NASAL 2 TIMES DAILY
Qty: 16 G | Refills: 11 | Status: SHIPPED | OUTPATIENT
Start: 2019-06-14 | End: 2020-06-01 | Stop reason: SDUPTHER

## 2019-06-19 ENCOUNTER — CLINICAL SUPPORT (OUTPATIENT)
Dept: OTOLARYNGOLOGY | Facility: CLINIC | Age: 49
End: 2019-06-19
Payer: COMMERCIAL

## 2019-06-19 DIAGNOSIS — J30.9 ALLERGIC RHINITIS, UNSPECIFIED SEASONALITY, UNSPECIFIED TRIGGER: ICD-10-CM

## 2019-06-19 PROCEDURE — 99999 PR PBB SHADOW E&M-EST. PATIENT-LVL II: CPT | Mod: PBBFAC,,,

## 2019-06-19 PROCEDURE — 95117 PR IMMU2THERAPY, 2+ INJECTIONS: ICD-10-PCS | Mod: S$GLB,,, | Performed by: OTOLARYNGOLOGY

## 2019-06-19 PROCEDURE — 95117 IMMUNOTHERAPY INJECTIONS: CPT | Mod: S$GLB,,, | Performed by: OTOLARYNGOLOGY

## 2019-06-19 PROCEDURE — 99999 PR PBB SHADOW E&M-EST. PATIENT-LVL II: ICD-10-PCS | Mod: PBBFAC,,,

## 2019-06-19 NOTE — PROGRESS NOTES
Date of treatment initiation: 04/09/2015  Initial SNOT-20 score: 28  Date of last followup visit with referring physician: 06/10/2016  Date next followup visit is due:06/2017  Most recent SNOT-20 score:  0    No Known Drug Allergies    Ordering Physician: Dr. Kurtz      MAINTENANCE VIALS - MANUFACTURED BY [facility]    Mix #1 - LOT# [id number]  Allergens: trees  Mix #2 - LOT# [id number]  Allergens: weeds and grasses  Mix #3 - LOT# [id number]  Allergens: molds, mites, cockroach, jason, dog, feathers      Administered 0.35 mL of red therapeutic vial -  mix #1 and #2 in left arm & mix #3 in right arm subcutaneously at 1000 manufactured by [employer]. Patient reports that he will wait in the building for 20 minutes after injection, he states that he will contact our office with any questions, concerns, or signs of a reaction.

## 2019-06-24 ENCOUNTER — PATIENT MESSAGE (OUTPATIENT)
Dept: OTOLARYNGOLOGY | Facility: CLINIC | Age: 49
End: 2019-06-24

## 2019-06-24 DIAGNOSIS — J33.9 NASAL POLYPOSIS: ICD-10-CM

## 2019-06-24 DIAGNOSIS — J32.4 CHRONIC PANSINUSITIS: ICD-10-CM

## 2019-06-24 RX ORDER — MONTELUKAST SODIUM 10 MG/1
10 TABLET ORAL NIGHTLY
Qty: 30 TABLET | Refills: 12 | Status: SHIPPED | OUTPATIENT
Start: 2019-06-24 | End: 2020-07-11 | Stop reason: SDUPTHER

## 2019-06-27 NOTE — PROGRESS NOTES
Last 5 Patient Entered Readings                                      Current 30 Day Average: 115/75     Recent Readings 6/24/2019 6/13/2019 6/6/2019 5/30/2019 5/23/2019    SBP (mmHg) 114 119 116 109 123    DBP (mmHg) 77 72 75 74 78    Pulse 70 70 77 73 78          BP remains at goal. No medication recommendations at this time.

## 2019-07-03 NOTE — PROGRESS NOTES
Last 5 Patient Entered Readings                                      Current 30 Day Average: 116/75     Recent Readings 6/27/2019 6/24/2019 6/13/2019 6/6/2019 5/30/2019    SBP (mmHg) 115 114 119 116 109    DBP (mmHg) 75 77 72 75 74    Pulse 71 70 70 77 73          Digital Medicine: Health  Follow Up    Lifestyle Modifications:    1.Dietary Modifications (Sodium intake <2,000mg/day, food labels, dining out): Reports he has been watching sodium intake.    2.Physical Activity: States he has been exercising more.  States he is making it a habit to walk his dog around the block in the morning and then going on a second walk in the evenings.  Encouraged patient to continue staying active.    3.Medication Therapy: Patient has been compliant with the medication regimen.    4.Patient has the following medication side effects/concerns: none  (Frequency/Alleviating factors/Precipitating factors, etc.)     Follow up with Mr. Irineo Betts completed. No further questions or concerns. Will continue to follow up to achieve health goals.

## 2019-07-18 ENCOUNTER — CLINICAL SUPPORT (OUTPATIENT)
Dept: OTOLARYNGOLOGY | Facility: CLINIC | Age: 49
End: 2019-07-18
Payer: COMMERCIAL

## 2019-07-18 DIAGNOSIS — J30.9 ALLERGIC RHINITIS, UNSPECIFIED SEASONALITY, UNSPECIFIED TRIGGER: ICD-10-CM

## 2019-07-18 PROCEDURE — 99999 PR PBB SHADOW E&M-EST. PATIENT-LVL II: ICD-10-PCS | Mod: PBBFAC,,,

## 2019-07-18 PROCEDURE — 95117 IMMUNOTHERAPY INJECTIONS: CPT | Mod: S$GLB,,, | Performed by: OTOLARYNGOLOGY

## 2019-07-18 PROCEDURE — 99999 PR PBB SHADOW E&M-EST. PATIENT-LVL II: CPT | Mod: PBBFAC,,,

## 2019-07-18 PROCEDURE — 95117 PR IMMU2THERAPY, 2+ INJECTIONS: ICD-10-PCS | Mod: S$GLB,,, | Performed by: OTOLARYNGOLOGY

## 2019-07-18 NOTE — PROGRESS NOTES
Date of treatment initiation: 04/09/2015  Initial SNOT-20 score: 28  Date of last followup visit with referring physician: 06/10/2016  Date next followup visit is due:06/2017  Most recent SNOT-20 score:  0    No Known Drug Allergies    Ordering Physician: Dr. Kurtz      MAINTENANCE VIALS - MANUFACTURED BY [facility]    Mix #1 - LOT# [id number]  Allergens: trees  Mix #2 - LOT# [id number]  Allergens: weeds and grasses  Mix #3 - LOT# [id number]  Allergens: molds, mites, cockroach, jason, dog, feathers      Administered 0.4 mL of red therapeutic vial -  mix #1 and #2 in left arm & mix #3 in right arm subcutaneously at 1030 manufactured by [employer]. Patient reports that he will wait in the building for 20 minutes after injection, he states that he will contact our office with any questions, concerns, or signs of a reaction.

## 2019-07-25 ENCOUNTER — OFFICE VISIT (OUTPATIENT)
Dept: OPHTHALMOLOGY | Facility: CLINIC | Age: 49
End: 2019-07-25
Payer: COMMERCIAL

## 2019-07-25 DIAGNOSIS — H17.9 CORNEAL SCAR, RIGHT EYE: ICD-10-CM

## 2019-07-25 DIAGNOSIS — H01.00A BLEPHARITIS OF BOTH UPPER AND LOWER EYELID OF RIGHT EYE, UNSPECIFIED TYPE: Primary | ICD-10-CM

## 2019-07-25 PROCEDURE — 99999 PR PBB SHADOW E&M-EST. PATIENT-LVL I: ICD-10-PCS | Mod: PBBFAC,,, | Performed by: OPHTHALMOLOGY

## 2019-07-25 PROCEDURE — 92012 PR EYE EXAM, EST PATIENT,INTERMED: ICD-10-PCS | Mod: S$GLB,,, | Performed by: OPHTHALMOLOGY

## 2019-07-25 PROCEDURE — 99999 PR PBB SHADOW E&M-EST. PATIENT-LVL I: CPT | Mod: PBBFAC,,, | Performed by: OPHTHALMOLOGY

## 2019-07-25 PROCEDURE — 92012 INTRM OPH EXAM EST PATIENT: CPT | Mod: S$GLB,,, | Performed by: OPHTHALMOLOGY

## 2019-07-25 NOTE — PROGRESS NOTES
SUBJECTIVE:   Irineo Betts is a 49 y.o. male   Uncorrected distance visual acuity was 20/20 in the right eye and 20/30 in the left eye.   No chief complaint on file.       HPI:  HPI     The patient was last seen in January for a corneal ulcer OD that has   recurred. The patient states Monday when he woke up his right eye was   crusty and irritated. The patient states he had Pred A left and used it   once a day.    1. HX Cornea Ulcer OD    Last edited by Uzma Cheema on 7/25/2019  8:07 AM. (History)        Assessment /Plan :  1. Blepharitis of both upper and lower eyelid of right eye, unspecified type Findings and symptoms consistent with mild blepharitis. The blepharitis instruction sheet was reviewed with the pt, recommending:Warm compresses, Gentle Lid Scrubs and [place] 3 Fish Oils 4546-3832 mg po bid     2. Corneal scar, right eye Stromal scar not visually impairing stable overall            RTC PRN

## 2019-08-08 ENCOUNTER — CLINICAL SUPPORT (OUTPATIENT)
Dept: OTOLARYNGOLOGY | Facility: CLINIC | Age: 49
End: 2019-08-08
Payer: COMMERCIAL

## 2019-08-08 DIAGNOSIS — J30.9 ALLERGIC RHINITIS, UNSPECIFIED SEASONALITY, UNSPECIFIED TRIGGER: ICD-10-CM

## 2019-08-08 PROCEDURE — 99999 PR PBB SHADOW E&M-EST. PATIENT-LVL II: CPT | Mod: PBBFAC,,,

## 2019-08-08 PROCEDURE — 95117 IMMUNOTHERAPY INJECTIONS: CPT | Mod: S$GLB,,, | Performed by: OTOLARYNGOLOGY

## 2019-08-08 PROCEDURE — 99999 PR PBB SHADOW E&M-EST. PATIENT-LVL II: ICD-10-PCS | Mod: PBBFAC,,,

## 2019-08-08 PROCEDURE — 95117 PR IMMU2THERAPY, 2+ INJECTIONS: ICD-10-PCS | Mod: S$GLB,,, | Performed by: OTOLARYNGOLOGY

## 2019-08-08 NOTE — PROGRESS NOTES
Date of treatment initiation: 04/09/2015  Initial SNOT-20 score: 28  Date of last followup visit with referring physician: 06/10/2016  Date next followup visit is due:06/2017  Most recent SNOT-20 score:  0    No Known Drug Allergies    Ordering Physician: Dr. Kurtz      MAINTENANCE VIALS - MANUFACTURED BY [facility]    Mix #1 - LOT# [id number]  Allergens: trees  Mix #2 - LOT# [id number]  Allergens: weeds and grasses  Mix #3 - LOT# [id number]  Allergens: molds, mites, cockroach, jason, dog, feathers      Administered 0.3 mL of red therapeutic vial -  mix #1 and #2 in left arm & mix #3 in right arm subcutaneously at 1315 manufactured by [employer]. Patient reports that he will wait in the building for 20 minutes after injection, he states that he will contact our office with any questions, concerns, or signs of a reaction.

## 2019-08-22 ENCOUNTER — CLINICAL SUPPORT (OUTPATIENT)
Dept: OTOLARYNGOLOGY | Facility: CLINIC | Age: 49
End: 2019-08-22
Payer: COMMERCIAL

## 2019-08-22 DIAGNOSIS — J30.9 ALLERGIC RHINITIS, UNSPECIFIED SEASONALITY, UNSPECIFIED TRIGGER: ICD-10-CM

## 2019-08-22 PROCEDURE — 99999 PR PBB SHADOW E&M-EST. PATIENT-LVL II: CPT | Mod: PBBFAC,,,

## 2019-08-22 PROCEDURE — 99999 PR PBB SHADOW E&M-EST. PATIENT-LVL II: ICD-10-PCS | Mod: PBBFAC,,,

## 2019-08-22 PROCEDURE — 95117 PR IMMU2THERAPY, 2+ INJECTIONS: ICD-10-PCS | Mod: S$GLB,,, | Performed by: OTOLARYNGOLOGY

## 2019-08-22 PROCEDURE — 95117 IMMUNOTHERAPY INJECTIONS: CPT | Mod: S$GLB,,, | Performed by: OTOLARYNGOLOGY

## 2019-08-22 NOTE — PROGRESS NOTES
Date of treatment initiation: 04/09/2015  Initial SNOT-20 score: 28  Date of last followup visit with referring physician: 06/10/2016  Date next followup visit is due:06/2017  Most recent SNOT-20 score:  0    No Known Drug Allergies    Ordering Physician: Dr. Kurtz      MAINTENANCE VIALS - MANUFACTURED BY [facility]    Mix #1 - LOT# [id number]  Allergens: trees  Mix #2 - LOT# [id number]  Allergens: weeds and grasses  Mix #3 - LOT# [id number]  Allergens: molds, mites, cockroach, jason, dog, feathers      Administered 0.35 mL of red therapeutic vial -  mix #1 and #2 in left arm & mix #3 in right arm subcutaneously at 1035 manufactured by [employer]. Patient reports that he will wait in the building for 20 minutes after injection, he states that he will contact our office with any questions, concerns, or signs of a reaction.

## 2019-08-29 ENCOUNTER — HOSPITAL ENCOUNTER (EMERGENCY)
Facility: HOSPITAL | Age: 49
Discharge: HOME | End: 2019-08-29
Attending: EMERGENCY MEDICINE
Payer: COMMERCIAL

## 2019-08-29 VITALS
SYSTOLIC BLOOD PRESSURE: 136 MMHG | OXYGEN SATURATION: 98 % | WEIGHT: 215 LBS | HEIGHT: 72 IN | HEART RATE: 70 BPM | BODY MASS INDEX: 29.12 KG/M2 | DIASTOLIC BLOOD PRESSURE: 89 MMHG | TEMPERATURE: 98 F | RESPIRATION RATE: 20 BRPM

## 2019-08-29 DIAGNOSIS — T18.108A ESOPHAGEAL FOREIGN BODY, INITIAL ENCOUNTER: Primary | ICD-10-CM

## 2019-08-29 PROCEDURE — 99284 EMERGENCY DEPT VISIT MOD MDM: CPT | Mod: 25

## 2019-08-29 PROCEDURE — 96374 THER/PROPH/DIAG INJ IV PUSH: CPT

## 2019-08-29 PROCEDURE — 63600175 PHARM REV CODE 636 W HCPCS: Mod: JG | Performed by: REGISTERED NURSE

## 2019-08-29 RX ORDER — GLUCAGON 1 MG
0.5 KIT INJECTION
Status: DISCONTINUED | OUTPATIENT
Start: 2019-08-29 | End: 2019-08-29

## 2019-08-29 RX ORDER — GLUCAGON 1 MG
1 KIT INJECTION
Status: COMPLETED | OUTPATIENT
Start: 2019-08-29 | End: 2019-08-29

## 2019-08-29 RX ADMIN — GLUCAGON HYDROCHLORIDE 1 MG: KIT at 09:08

## 2019-08-30 NOTE — ED PROVIDER NOTES
"Rui Plummer, CIELO, transfers care of pt to Dr. Yuliya Michael    8/29/2019, 9:45 PM   History obtained from the patient      History of Present Illness: Irineo Betts is a 49 y.o. male patient who presents to the Emergency Department for food bolus which onset PTA. Pt reports he was eating steak and its now stuck in throat. Denies any difficulty breathing, pt stable at this time.       SCRIBE #1 NOTE: I, Georgie Tee, am scribing for, and in the presence of, Yuliya Michael MD. I have scribed the entire note.       History     Chief Complaint   Patient presents with    Foreign Body In Throat     reports was eating steak and began choking, now lodged in throat. unable to swallow in triage and reports feeling SOB     Review of patient's allergies indicates:  No Known Allergies      History of Present Illness     HPI    8/29/2019, 10:23 PM  History obtained from the patient      History of Present Illness: Irineo Betts is a 49 y.o. male patient with a PMHx of HTN, seasonal allergies, and Nephrolithiasis who presents to the Emergency Department for evaluation of foreign body in throat which onset suddenly this evening around 8 PM. Pt states that he was eating a piece of steak tonight at dinner when suddenly it became lodged in his throat. Pt reports difficulty breathing for a few seconds following the incident. Pt also states that his was unable to swallow water and keeps "spitting up". Symptoms are constant and moderate in severity. No mitigating or exacerbating factors reported. Associated sxs include dysphagia. Patient denies any SOB, cough, voice changes, drooling, sore throat, CP, palpations, abdominal pain, n/v/d, fever/ chills, dysuria, hematuria, and all other sxs at this time. No further complaints or concerns at this time.     Arrival mode: Personal vehicle       PCP: TUCKER Hernández Jr, MD        Past Medical History:  Past Medical History:   Diagnosis Date    Allergy     Benign " heart murmur     Corneal ulcer 01/08/2019    right eye    Hypertension     Nephrolithiasis        Past Surgical History:  Past Surgical History:   Procedure Laterality Date    SINUS SURGERY      SINUS SURGERY FUNCTIONAL ENDOSCOPIC WITH NAVIGATION Bilateral 2/12/2015    [facility]    UNDESCENDED TESTICLE EXPLORATION           Family History:  Family History   Problem Relation Age of Onset    Heart disease Father     Cancer Maternal Grandmother     Cancer Maternal Grandfather        Social History:  Social History     Tobacco Use    Smoking status: Never Smoker    Smokeless tobacco: Never Used   Substance and Sexual Activity    Alcohol use: Yes     Comment: occassinally      Drug use: No    Sexual activity: Unknown        Review of Systems     Review of Systems   Constitutional: Negative for chills and fever.   HENT: Positive for trouble swallowing. Negative for drooling, rhinorrhea, sore throat and voice change.         + Foreign body stuck in throat   Respiratory: Negative for cough and shortness of breath.    Cardiovascular: Negative for chest pain and palpitations.   Gastrointestinal: Negative for abdominal pain, diarrhea, nausea and vomiting.   Genitourinary: Negative for dysuria and hematuria.   Musculoskeletal: Negative for back pain and neck pain.   Skin: Negative for rash and wound.   Neurological: Negative for dizziness, weakness, light-headedness and headaches.   Hematological: Does not bruise/bleed easily.   All other systems reviewed and are negative.     Physical Exam     Initial Vitals [08/29/19 2137]   BP Pulse Resp Temp SpO2   (!) 160/89 103 20 98.3 °F (36.8 °C) 98 %      MAP       --          Physical Exam  Nursing Notes and Vital Signs Reviewed.  Constitutional: Patient is in no acute distress. Well-developed and well-nourished.  Head: Atraumatic. Normocephalic.  Eyes: PERRL. EOM intact. Conjunctivae are not pale. No scleral icterus.  ENT: Mucous membranes  are moist. Oropharynx is clear and symmetric.    Neck: Supple. Full ROM. No lymphadenopathy.  Cardiovascular: Regular rate. Regular rhythm. No murmurs, rubs, or gallops. Distal pulses are 2+ and symmetric.  Pulmonary/Chest: No respiratory distress. Clear to auscultation bilaterally. No wheezing or rales.  Abdominal: Soft and non-distended.  There is no tenderness.  No rebound, guarding, or rigidity. Good bowel sounds.  Genitourinary: No CVA tenderness  Musculoskeletal: Moves all extremities. No obvious deformities. No edema. No calf tenderness.  Skin: Warm and dry.  Neurological:  Alert, awake, and appropriate.  Normal speech.  No acute focal neurological deficits are appreciated.  Psychiatric: Normal affect. Good eye contact. Appropriate in content.     ED Course   Procedures  ED Vital Signs:  Vitals:    08/29/19 2137 08/29/19 2230 08/29/19 2301   BP: (!) 160/89 (!) 145/90 136/89   Pulse: 103 85 70   Resp: 20 20 20   Temp: 98.3 °F (36.8 °C)  98 °F (36.7 °C)   TempSrc: Oral  Oral   SpO2: 98% 97% 98%   Weight: 97.5 kg (215 lb)     Height: 6' (1.829 m)         Abnormal Lab Results:  Labs Reviewed - No data to display     All Lab Results:  None.     Imaging Results:  Imaging Results    None                 The Emergency Provider reviewed the vital signs and test results, which are outlined above.     ED Discussion     10:25 PM: Pt received Glucagon 30 minutes ago, but pt denies any sx relief at this time. Ordering po challenge.    10:30 PM: Nurse informed me that pt was able to swallow water.     10:50 PM: Re-evaluated pt. Pt is resting comfortably and is in no acute distress. Gave the pt some water. Pt had a few hiccups and then states that he felt the piece of steak become dislodged from his throat  D/w pt all pertinent results. D/w pt any concerns expressed at this time. Answered all questions. Pt expresses understanding at this time.    11:01 PM: Reassessed pt at this time. Pt is able to tolerate water without  difficulty. Pt states his condition has improved at this time. Discussed with pt all pertinent ED information. Discussed pt dx and plan of tx. Gave pt all f/u and return to the ED instructions. All questions and concerns were addressed at this time. Pt expresses understanding of information and instructions, and is comfortable with plan to discharge. Pt is stable for discharge.    I discussed with patient and/or family/caretaker that evaluation in the ED does not suggest any emergent or life threatening medical conditions requiring immediate intervention beyond what was provided in the ED, and I believe patient is safe for discharge.  Regardless, an unremarkable evaluation in the ED does not preclude the development or presence of a serious of life threatening condition. As such, patient was instructed to return immediately for any worsening or change in current symptoms.                 ED Medication(s):  Medications   glucagon (human recombinant) injection 1 mg (1 mg Intravenous Given 8/29/19 2151)       Discharge Medication List as of 8/29/2019 11:00 PM          Follow-up Information     E Jorge Luis Hernández Jr, MD. Schedule an appointment as soon as possible for a visit in 2 days.    Specialties:  Internal Medicine, Pediatrics  Why:  Can rerturn to the ER, If symptoms worsen  Contact information:  [id number]  [place] [address]  [phone]             Jessy Dias MD. Call in 1 day.    Specialty:  Gastroenterology  Why:  Need to call GI in AM and look at setting up follow up for possible EGD  Contact information:  [id number]  [place] [address]  [phone]                       Scribe Attestation:   Scribe #1: I performed the above scribed service and the documentation accurately describes the services I performed. I attest to the accuracy of the note.     Attending:   Physician Attestation Statement for Scribe #1: I, Yuliya Michael MD, personally performed the services described  in this documentation, as scribed by Georgie Tee, in my presence, and it is both accurate and complete.           Clinical Impression       ICD-10-CM ICD-9-CM   1. Esophageal foreign body, initial encounter T18.108A 935.1     E915       Disposition:   Disposition: Discharged  Condition: Stable           Yuliya Michael MD  08/30/19 0258

## 2019-09-10 ENCOUNTER — PATIENT OUTREACH (OUTPATIENT)
Dept: OTHER | Facility: OTHER | Age: 49
End: 2019-09-10

## 2019-09-12 ENCOUNTER — CLINICAL SUPPORT (OUTPATIENT)
Dept: OTOLARYNGOLOGY | Facility: CLINIC | Age: 49
End: 2019-09-12
Payer: COMMERCIAL

## 2019-09-12 ENCOUNTER — OFFICE VISIT (OUTPATIENT)
Dept: GASTROENTEROLOGY | Facility: CLINIC | Age: 49
End: 2019-09-12
Payer: COMMERCIAL

## 2019-09-12 VITALS — BODY MASS INDEX: 30.4 KG/M2 | HEIGHT: 72 IN | WEIGHT: 224.44 LBS

## 2019-09-12 DIAGNOSIS — R13.10 DYSPHAGIA, UNSPECIFIED TYPE: Primary | ICD-10-CM

## 2019-09-12 DIAGNOSIS — J32.4 CHRONIC PANSINUSITIS: ICD-10-CM

## 2019-09-12 DIAGNOSIS — J30.9 ALLERGIC RHINITIS, UNSPECIFIED SEASONALITY, UNSPECIFIED TRIGGER: ICD-10-CM

## 2019-09-12 PROCEDURE — 99999 PR PBB SHADOW E&M-EST. PATIENT-LVL III: CPT | Mod: PBBFAC,,, | Performed by: NURSE PRACTITIONER

## 2019-09-12 PROCEDURE — 99204 OFFICE O/P NEW MOD 45 MIN: CPT | Mod: S$GLB,,, | Performed by: NURSE PRACTITIONER

## 2019-09-12 PROCEDURE — 99999 PR PBB SHADOW E&M-EST. PATIENT-LVL II: ICD-10-PCS | Mod: PBBFAC,,,

## 2019-09-12 PROCEDURE — 3008F BODY MASS INDEX DOCD: CPT | Mod: CPTII,S$GLB,, | Performed by: NURSE PRACTITIONER

## 2019-09-12 PROCEDURE — 95117 PR IMMU2THERAPY, 2+ INJECTIONS: ICD-10-PCS | Mod: S$GLB,,, | Performed by: OTOLARYNGOLOGY

## 2019-09-12 PROCEDURE — 99999 PR PBB SHADOW E&M-EST. PATIENT-LVL II: CPT | Mod: PBBFAC,,,

## 2019-09-12 PROCEDURE — 99999 PR PBB SHADOW E&M-EST. PATIENT-LVL III: ICD-10-PCS | Mod: PBBFAC,,, | Performed by: NURSE PRACTITIONER

## 2019-09-12 PROCEDURE — 95117 IMMUNOTHERAPY INJECTIONS: CPT | Mod: S$GLB,,, | Performed by: OTOLARYNGOLOGY

## 2019-09-12 PROCEDURE — 3008F PR BODY MASS INDEX (BMI) DOCUMENTED: ICD-10-PCS | Mod: CPTII,S$GLB,, | Performed by: NURSE PRACTITIONER

## 2019-09-12 PROCEDURE — 99204 PR OFFICE/OUTPT VISIT, NEW, LEVL IV, 45-59 MIN: ICD-10-PCS | Mod: S$GLB,,, | Performed by: NURSE PRACTITIONER

## 2019-09-12 NOTE — PROGRESS NOTES
Clinic Consult:  [facility] Consultation Note    Reason for Consult:  The primary encounter diagnosis was Dysphagia, unspecified type. A diagnosis of Chronic pansinusitis was also pertinent to this visit.    PCP: TUCKER Hernández Jr   No address on file    HPI:  This is a 49 y.o. male here for evaluation of the above  Pt was recently in the ER for a food bolus that cleared without intervention.   He states that he has had multiple episodes of mild dysphagia over the last few months. To solids only, mostly bread and meat.   He denies any other episodes of SOB or difficulty breathing.   He has not had any episodes since the ER visit.   PMH includes chronic sinusitis.   Takes daily antihistamines    Review of Systems   Constitutional: Negative for chills, fever, malaise/fatigue and weight loss.   Respiratory: Negative for cough.    Cardiovascular: Negative for chest pain.   Gastrointestinal:        Per HPI   Musculoskeletal: Negative for myalgias.   Skin: Negative for itching and rash.   Neurological: Negative for headaches.   Psychiatric/Behavioral: The patient is not nervous/anxious.        Medical History:   Past Medical History:   Diagnosis Date    Allergy     Benign heart murmur     Corneal ulcer 01/08/2019    right eye    Hypertension     Nephrolithiasis        Surgical History:  Past Surgical History:   Procedure Laterality Date    SINUS SURGERY      SINUS SURGERY FUNCTIONAL ENDOSCOPIC WITH NAVIGATION Bilateral 2/12/2015    Per[facility] by [facility]    UNDESCENDED TESTICLE EXPLORATION         Family History:   Family History   Problem Relation Age of Onset    Heart disease Father     Cancer Maternal Grandmother     Cancer Maternal Grandfather        Social History:   Social History     Tobacco Use    Smoking status: Never Smoker    Smokeless tobacco: Never Used   Substance Use Topics    Alcohol use: Yes     Comment: occassinally      Drug use: No       Allergies:  Reviewed    Home Medications:   Current Outpatient Medications on File Prior to Visit   Medication Sig Dispense Refill    Allergy Mix Therapeutic SCIT - formulation in notes 1 Package 3    aspirin (ECOTRIN) 81 MG EC tablet Take 81 mg by mouth once daily.      chlorthalidone (HYGROTEN) 25 MG Tab Take 1 tablet by mouth daily for blood pressure 90 tablet 3    fluticasone propionate (FLONASE) 50 mcg/actuation nasal spray Instill 2 sprays (100 mcg total) by Each nostril 2 (two) times daily. 16 g 11    losartan (COZAAR) 50 MG tablet Take 1 tablet (50 mg total) by mouth 2 (two) times daily. (for blood pressure). 180 tablet 3    sertraline (ZOLOFT) 50 MG tablet Take 1 tablet (50 mg total) by mouth once daily. 30 tablet 11    traZODone (DESYREL) 100 MG tablet Take 1 tablet (100 mg total) by mouth every evening. 30 tablet 11    montelukast (SINGULAIR) 10 mg tablet Take 1 tablet (10 mg total) by mouth every evening. 30 tablet 12     No current facility-administered medications on file prior to visit.        Physical Exam:  Vital Signs:  Ht 6' (1.829 m)   Wt 101.8 kg (224 lb 6.9 oz)   BMI 30.44 kg/m²   Body mass index is 30.44 kg/m².  Physical Exam   Constitutional: He is oriented to person, place, and time. He appears well-developed and well-nourished.   HENT:   Head: Normocephalic.   Eyes: No scleral icterus.   Neck: Normal range of motion.   Cardiovascular: Normal rate and regular rhythm.   Pulmonary/Chest: Effort normal and breath sounds normal.   Abdominal: Soft. Bowel sounds are normal. He exhibits no distension. There is no tenderness.   Musculoskeletal: Normal range of motion.   Neurological: He is alert and oriented to person, place, and time.   Skin: Skin is warm and dry.   Psychiatric: He has a normal mood and affect.   Vitals reviewed.      Labs: Pertinent labs reviewed.    Assessment:  1. Dysphagia, unspecified type    2. Chronic pansinusitis         Recommendations:  - ? Eoe given the PMH and dysphagia.    Plan for EGD with biopsies   - 6-food elimination discussed.       Follow up to be determined by results of procedure.      Thank you so much for allowing me to participate in the care of BRYSON Ramos

## 2019-09-12 NOTE — PROGRESS NOTES
Date of treatment initiation: 04/09/2015  Initial SNOT-20 score: 28  Date of last followup visit with referring physician: 06/10/2016  Date next followup visit is due:06/2017  Most recent SNOT-20 score:  0    No Known Drug Allergies    Ordering Physician: Dr. Kurtz      MAINTENANCE VIALS - MANUFACTURED BY [facility]    Mix #1 - LOT# [id number]  Allergens: trees  Mix #2 - LOT# [id number]  Allergens: weeds and grasses  Mix #3 - LOT# [id number]  Allergens: molds, mites, cockroach, jason, dog, feathers      Administered 0.4 mL of red therapeutic vial -  mix #1 and #2 in left arm & mix #3 in right arm subcutaneously at 1150 manufactured by [employer]. Patient reports that he will wait in the building for 20 minutes after injection, he states that he will contact our office with any questions, concerns, or signs of a reaction.

## 2019-09-26 ENCOUNTER — CLINICAL SUPPORT (OUTPATIENT)
Dept: OTOLARYNGOLOGY | Facility: CLINIC | Age: 49
End: 2019-09-26
Payer: COMMERCIAL

## 2019-09-26 DIAGNOSIS — J30.9 ALLERGIC RHINITIS, UNSPECIFIED SEASONALITY, UNSPECIFIED TRIGGER: ICD-10-CM

## 2019-09-26 PROCEDURE — 95117 IMMUNOTHERAPY INJECTIONS: CPT | Mod: S$GLB,,, | Performed by: OTOLARYNGOLOGY

## 2019-09-26 PROCEDURE — 99999 PR PBB SHADOW E&M-EST. PATIENT-LVL II: ICD-10-PCS | Mod: PBBFAC,,,

## 2019-09-26 PROCEDURE — 95117 PR IMMU2THERAPY, 2+ INJECTIONS: ICD-10-PCS | Mod: S$GLB,,, | Performed by: OTOLARYNGOLOGY

## 2019-09-26 PROCEDURE — 99999 PR PBB SHADOW E&M-EST. PATIENT-LVL II: CPT | Mod: PBBFAC,,,

## 2019-09-26 NOTE — PROGRESS NOTES
Date of treatment initiation: 04/09/2015  Initial SNOT-20 score: 28  Date of last followup visit with referring physician: 06/10/2016  Date next followup visit is due:06/2017  Most recent SNOT-20 score:  0    No Known Drug Allergies    Ordering Physician: Dr. Kurtz      MAINTENANCE VIALS - MANUFACTURED BY [facility]    Mix #1 - LOT# [id number]  Allergens: trees  Mix #2 - LOT# [id number]  Allergens: weeds and grasses  Mix #3 - LOT# [id number]  Allergens: molds, mites, cockroach, jason, dog, feathers      Administered 0.45 mL of red therapeutic vial -  mix #1 and #2 in left arm & mix #3 in right arm subcutaneously at 1040 manufactured by [employer]. Patient reports that he will wait in the building for 20 minutes after injection, he states that he will contact our office with any questions, concerns, or signs of a reaction.

## 2019-10-10 ENCOUNTER — CLINICAL SUPPORT (OUTPATIENT)
Dept: OTOLARYNGOLOGY | Facility: CLINIC | Age: 49
End: 2019-10-10
Payer: COMMERCIAL

## 2019-10-10 DIAGNOSIS — J30.9 ALLERGIC RHINITIS, UNSPECIFIED SEASONALITY, UNSPECIFIED TRIGGER: ICD-10-CM

## 2019-10-10 PROCEDURE — 95117 PR IMMU2THERAPY, 2+ INJECTIONS: ICD-10-PCS | Mod: S$GLB,,, | Performed by: OTOLARYNGOLOGY

## 2019-10-10 PROCEDURE — 99999 PR PBB SHADOW E&M-EST. PATIENT-LVL II: CPT | Mod: PBBFAC,,,

## 2019-10-10 PROCEDURE — 99999 PR PBB SHADOW E&M-EST. PATIENT-LVL II: ICD-10-PCS | Mod: PBBFAC,,,

## 2019-10-10 PROCEDURE — 95117 IMMUNOTHERAPY INJECTIONS: CPT | Mod: S$GLB,,, | Performed by: OTOLARYNGOLOGY

## 2019-10-10 NOTE — PROGRESS NOTES
Date of treatment initiation: 04/09/2015  Initial SNOT-20 score: 28  Date of last followup visit with referring physician: 06/10/2016  Date next followup visit is due:06/2017  Most recent SNOT-20 score:  0    No Known Drug Allergies    Ordering Physician: Dr. Kurtz      MAINTENANCE VIALS - MANUFACTURED BY [facility]    Mix #1 - LOT# [id number]  Allergens: trees  Mix #2 - LOT# [id number]  Allergens: weeds and grasses  Mix #3 - LOT# [id number]  Allergens: molds, mites, cockroach, jason, dog, feathers      Administered 0.5 mL of red therapeutic vial -  mix #1 and #2 in left arm & mix #3 in right arm subcutaneously at 1115 manufactured by [employer]. Patient reports that he will wait in the building for 20 minutes after injection, he states that he will contact our office with any questions, concerns, or signs of a reaction.

## 2019-10-24 ENCOUNTER — CLINICAL SUPPORT (OUTPATIENT)
Dept: OTOLARYNGOLOGY | Facility: CLINIC | Age: 49
End: 2019-10-24
Payer: COMMERCIAL

## 2019-10-24 DIAGNOSIS — J30.9 ALLERGIC RHINITIS, UNSPECIFIED SEASONALITY, UNSPECIFIED TRIGGER: ICD-10-CM

## 2019-10-24 PROCEDURE — 95117 IMMUNOTHERAPY INJECTIONS: CPT | Mod: S$GLB,,, | Performed by: OTOLARYNGOLOGY

## 2019-10-24 PROCEDURE — 95117 PR IMMU2THERAPY, 2+ INJECTIONS: ICD-10-PCS | Mod: S$GLB,,, | Performed by: OTOLARYNGOLOGY

## 2019-10-24 PROCEDURE — 99999 PR PBB SHADOW E&M-EST. PATIENT-LVL II: CPT | Mod: PBBFAC,,,

## 2019-10-24 PROCEDURE — 99999 PR PBB SHADOW E&M-EST. PATIENT-LVL II: ICD-10-PCS | Mod: PBBFAC,,,

## 2019-10-24 NOTE — PROGRESS NOTES
Date of treatment initiation: 04/09/2015  Initial SNOT-20 score: 28  Date of last followup visit with referring physician: 06/10/2016  Date next followup visit is due:06/2017  Most recent SNOT-20 score:  0    No Known Drug Allergies    Ordering Physician: Dr. Kurtz      MAINTENANCE VIALS - MANUFACTURED BY [facility]    Mix #1 - LOT# [id number]  Allergens: trees  Mix #2 - LOT# [id number]  Allergens: weeds and grasses  Mix #3 - LOT# [id number]  Allergens: molds, mites, cockroach, jason, dog, feathers      Administered 0.5 mL of red therapeutic vial -  mix #1 and #2 in left arm & mix #3 in right arm subcutaneously at 1100 manufactured by [employer]. Patient reports that he will wait in the building for 20 minutes after injection, he states that he will contact our office with any questions, concerns, or signs of a reaction.

## 2019-11-06 ENCOUNTER — CLINICAL SUPPORT (OUTPATIENT)
Dept: OTOLARYNGOLOGY | Facility: CLINIC | Age: 49
End: 2019-11-06
Payer: COMMERCIAL

## 2019-11-06 DIAGNOSIS — J30.9 ALLERGIC RHINITIS, UNSPECIFIED SEASONALITY, UNSPECIFIED TRIGGER: ICD-10-CM

## 2019-11-06 PROCEDURE — 95117 PR IMMU2THERAPY, 2+ INJECTIONS: ICD-10-PCS | Mod: S$GLB,,, | Performed by: OTOLARYNGOLOGY

## 2019-11-06 PROCEDURE — 95165 ANTIGEN THERAPY SERVICES: CPT | Mod: S$GLB,,, | Performed by: OTOLARYNGOLOGY

## 2019-11-06 PROCEDURE — 99999 PR PBB SHADOW E&M-EST. PATIENT-LVL II: ICD-10-PCS | Mod: PBBFAC,,,

## 2019-11-06 PROCEDURE — 95165 PR PROFES SVC,IMMUNOTHER,SINGLE/MULT AGS: ICD-10-PCS | Mod: S$GLB,,, | Performed by: OTOLARYNGOLOGY

## 2019-11-06 PROCEDURE — 95117 IMMUNOTHERAPY INJECTIONS: CPT | Mod: S$GLB,,, | Performed by: OTOLARYNGOLOGY

## 2019-11-06 PROCEDURE — 99999 PR PBB SHADOW E&M-EST. PATIENT-LVL II: CPT | Mod: PBBFAC,,,

## 2019-11-08 ENCOUNTER — PATIENT OUTREACH (OUTPATIENT)
Dept: OTHER | Facility: OTHER | Age: 49
End: 2019-11-08

## 2019-11-08 NOTE — PROGRESS NOTES
Digital Medicine: Clinician Follow-Up    Patient attribute lack of BP readings to putting his cuff out of sight. He has now put it in new location to help him remember to take measurements. He reports charging cuff this week.    He denies questions or concerns about his medications.    The history is provided by the patient.     Follow Up  Follow-up reason(s): reading review          INTERVENTION(S)  reviewed appropriate dose schedule and denied questions    PLAN  patient verbalizes understanding, additional monitoring needed and Health  follow up    Average slightly above goal but need more recent readings to better assess control  Advised patient health  has been attempting contact. He requests she calls later today; health  notified and in agreement.  Continue current regimen.  He has annual labs scheduled in 4/2020.      There are no preventive care reminders to display for this patient.    Last 5 Patient Entered Readings                                      Current 30 Day Average: 131/81     Recent Readings 11/4/2019 10/9/2019 9/20/2019 9/6/2019 8/28/2019    SBP (mmHg) 128 133 121 133 113    DBP (mmHg) 88 74 75 80 68    Pulse 70 90 66 73 73             Hypertension Medications             chlorthalidone (HYGROTEN) 25 MG Tab Take 1 tablet by mouth daily for blood pressure    losartan (COZAAR) 50 MG tablet Take 1 tablet (50 mg total) by mouth 2 (two) times daily. (for blood pressure).                             Medication Adherence Screening     He does not wonder if medications are working.  He knows purpose of medications.

## 2019-11-08 NOTE — PROGRESS NOTES
Digital Medicine: Health  Follow-Up    The history is provided by the patient.     Follow Up  Follow-up reason(s): routine education      Routine Education Topics: physical activity        INTERVENTION(S)  recommend physical activity, reviewed monitoring technique and encouragement/support    PLAN  patient verbalizes understanding      There are no preventive care reminders to display for this patient.    Last 5 Patient Entered Readings                                      Current 30 Day Average: 131/81     Recent Readings 11/4/2019 10/9/2019 9/20/2019 9/6/2019 8/28/2019    SBP (mmHg) 128 133 121 133 113    DBP (mmHg) 88 74 75 80 68    Pulse 70 90 66 73 73                      Physical Activity Screening   When asked if exercising, patient responded: yes    He exercises for 60 minutes per day 3 day(s) a week.      Patient participates in the following activities: elliptical and walking    Reports he has a gym in his apt complex.    Intervention(s): goal tracking       SDOH

## 2019-11-14 PROBLEM — R13.10 DYSPHAGIA: Status: ACTIVE | Noted: 2019-11-14
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
## 2019-11-14 PROBLEM — T18.128A FOOD IMPACTION OF ESOPHAGUS: Status: ACTIVE | Noted: 2019-11-14

## 2019-11-15 ENCOUNTER — HOSPITAL ENCOUNTER (OUTPATIENT)
Facility: HOSPITAL | Age: 49
Discharge: HOME | End: 2019-11-15
Attending: INTERNAL MEDICINE | Admitting: INTERNAL MEDICINE
Payer: COMMERCIAL

## 2019-11-15 ENCOUNTER — ANESTHESIA (OUTPATIENT)
Dept: ENDOSCOPY | Facility: HOSPITAL | Age: 49
End: 2019-11-15
Payer: COMMERCIAL

## 2019-11-15 ENCOUNTER — ANESTHESIA EVENT (OUTPATIENT)
Dept: ENDOSCOPY | Facility: HOSPITAL | Age: 49
End: 2019-11-15
Payer: COMMERCIAL

## 2019-11-15 VITALS
HEIGHT: 72 IN | HEART RATE: 74 BPM | DIASTOLIC BLOOD PRESSURE: 78 MMHG | WEIGHT: 228.38 LBS | TEMPERATURE: 99 F | BODY MASS INDEX: 30.93 KG/M2 | RESPIRATION RATE: 18 BRPM | OXYGEN SATURATION: 97 % | SYSTOLIC BLOOD PRESSURE: 128 MMHG
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
## 2019-11-15 DIAGNOSIS — R13.10 DYSPHAGIA: ICD-10-CM

## 2019-11-15 DIAGNOSIS — T18.128D FOOD IMPACTION OF ESOPHAGUS, SUBSEQUENT ENCOUNTER: ICD-10-CM

## 2019-11-15 DIAGNOSIS — R13.10 DYSPHAGIA, UNSPECIFIED TYPE: Primary | ICD-10-CM

## 2019-11-15 PROCEDURE — 43239 EGD BIOPSY SINGLE/MULTIPLE: CPT | Mod: ,,, | Performed by: INTERNAL MEDICINE

## 2019-11-15 PROCEDURE — 88305 TISSUE EXAM BY PATHOLOGIST: CPT | Mod: 59 | Performed by: PATHOLOGY

## 2019-11-15 PROCEDURE — 37000008 HC ANESTHESIA 1ST 15 MINUTES: Performed by: INTERNAL MEDICINE

## 2019-11-15 PROCEDURE — 88305 TISSUE EXAM BY PATHOLOGIST: ICD-10-PCS | Mod: 26,,, | Performed by: PATHOLOGY

## 2019-11-15 PROCEDURE — 43239 PR EGD, FLEX, W/BIOPSY, SGL/MULTI: ICD-10-PCS | Mod: ,,, | Performed by: INTERNAL MEDICINE

## 2019-11-15 PROCEDURE — 63600175 PHARM REV CODE 636 W HCPCS: Performed by: INTERNAL MEDICINE

## 2019-11-15 PROCEDURE — 37000009 HC ANESTHESIA EA ADD 15 MINS: Performed by: INTERNAL MEDICINE

## 2019-11-15 PROCEDURE — 27201012 HC FORCEPS, HOT/COLD, DISP: Performed by: INTERNAL MEDICINE

## 2019-11-15 PROCEDURE — 88305 TISSUE EXAM BY PATHOLOGIST: CPT | Mod: 26,,, | Performed by: PATHOLOGY

## 2019-11-15 PROCEDURE — 43239 EGD BIOPSY SINGLE/MULTIPLE: CPT | Performed by: INTERNAL MEDICINE

## 2019-11-15 PROCEDURE — 63600175 PHARM REV CODE 636 W HCPCS: Performed by: NURSE ANESTHETIST, CERTIFIED REGISTERED

## 2019-11-15 RX ORDER — PROPOFOL 10 MG/ML
VIAL (ML) INTRAVENOUS
Status: DISCONTINUED | OUTPATIENT
Start: 2019-11-15 | End: 2019-11-15

## 2019-11-15 RX ORDER — SODIUM CHLORIDE, SODIUM LACTATE, POTASSIUM CHLORIDE, CALCIUM CHLORIDE 600; 310; 30; 20 MG/100ML; MG/100ML; MG/100ML; MG/100ML
INJECTION, SOLUTION INTRAVENOUS CONTINUOUS
Status: DISCONTINUED | OUTPATIENT
Start: 2019-11-15 | End: 2019-11-15 | Stop reason: HOSPADM

## 2019-11-15 RX ORDER — SODIUM CHLORIDE 0.9 % (FLUSH) 0.9 %
10 SYRINGE (ML) INJECTION
Status: DISCONTINUED | OUTPATIENT
Start: 2019-11-15 | End: 2019-11-15 | Stop reason: HOSPADM

## 2019-11-15 RX ORDER — LIDOCAINE HCL/PF 100 MG/5ML
SYRINGE (ML) INTRAVENOUS
Status: DISCONTINUED | OUTPATIENT
Start: 2019-11-15 | End: 2019-11-15

## 2019-11-15 RX ORDER — PANTOPRAZOLE SODIUM 40 MG/1
40 TABLET, DELAYED RELEASE ORAL DAILY
Qty: 30 TABLET | Refills: 5 | Status: SHIPPED | OUTPATIENT
Start: 2019-11-15 | End: 2020-05-12 | Stop reason: SDUPTHER

## 2019-11-15 RX ADMIN — PROPOFOL 50 MG: 10 INJECTION, EMULSION INTRAVENOUS at 08:11

## 2019-11-15 RX ADMIN — LIDOCAINE HYDROCHLORIDE 50 MG: 20 INJECTION, SOLUTION INTRAVENOUS at 08:11

## 2019-11-15 RX ADMIN — PROPOFOL 100 MG: 10 INJECTION, EMULSION INTRAVENOUS at 08:11

## 2019-11-15 RX ADMIN — PROPOFOL 25 MG: 10 INJECTION, EMULSION INTRAVENOUS at 08:11

## 2019-11-15 RX ADMIN — SODIUM CHLORIDE, SODIUM LACTATE, POTASSIUM CHLORIDE, AND CALCIUM CHLORIDE: 600; 310; 30; 20 INJECTION, SOLUTION INTRAVENOUS at 08:11

## 2019-11-15 NOTE — [employer] PATIENT INSTRUCTIONS
Discharge Summary/Instructions after an Endoscopic Procedure  Patient Name: Irineo Betts  Patient MRN: [id number]  Patient YOB: 1970  Friday, November 15, 2019 Edilma Fleming MD  RESTRICTIONS:  During your procedure today, you received medications for sedation.  These   medications may affect your judgment, balance and coordination.  Therefore,   for 24 hours, you have the following restrictions:   - DO NOT drive a car, operate machinery, make legal/financial decisions,   sign important papers or drink alcohol.    ACTIVITY:  Today: no heavy lifting, straining or running due to procedural   sedation/anesthesia.  The following day: return to full activity including work.  DIET:  Eat and drink normally unless instructed otherwise.     TREATMENT FOR COMMON SIDE EFFECTS:  - Mild abdominal pain, nausea, belching, bloating or excessive gas:  rest,   eat lightly and use a heating pad.  - Sore Throat: treat with throat lozenges and/or gargle with warm salt   water.  - Because air was used during the procedure, expelling large amounts of air   from your rectum or belching is normal.  - If a bowel prep was taken, you may not have a bowel movement for 1-3 days.    This is normal.  SYMPTOMS TO WATCH FOR AND REPORT TO YOUR PHYSICIAN:  1. Abdominal pain or bloating, other than gas cramps.  2. Chest pain.  3. Back pain.  4. Signs of infection such as: chills or fever occurring within 24 hours   after the procedure.  5. Rectal bleeding, which would show as bright red, maroon, or black stools.   (A tablespoon of blood from the rectum is not serious, especially if   hemorrhoids are present.)  6. Vomiting.  7. Weakness or dizziness.  GO DIRECTLY TO THE NEAREST EMERGENCY ROOM IF YOU HAVE ANY OF THE FOLLOWING:      Difficulty breathing              Chills and/or fever over 101 F   Persistent vomiting and/or vomiting blood   Severe abdominal pain   Severe chest pain   Black, tarry stools   Bleeding- more than one  tablespoon   Any other symptom or condition that you feel may need urgent attention  Your doctor recommends these additional instructions:  If any biopsies were taken, your doctors clinic will contact you in 1 to 2   weeks with any results.  - Discharge patient to home (via wheelchair).   - Resume previous diet.   - Continue present medications.   - Await pathology results.   - Use Protonix (pantoprazole) 40 mg PO daily.   - Patient has a contact number available for emergencies.  The signs and   symptoms of potential delayed complications were discussed with the   patient.  Return to normal activities tomorrow.  Written discharge   instructions were provided to the patient.   - Telephone GI clinic for pathology results in 2 weeks.  For questions, problems or results please call your physician Edilma Fleming MD at Work:  [phone]  If you have any questions about the above instructions, call the GI   department at [phone] or call the endoscopy unit at [phone]   from 7am until 3 pm.  [facility] [place], EMERGENCY ROOM PHONE NUMBER:   [phone]  IF A COMPLICATION OR EMERGENCY SITUATION ARISES AND YOU ARE UNABLE TO REACH   YOUR PHYSICIAN - GO DIRECTLY TO THE EMERGENCY ROOM.  I have read or have had read to me these discharge instructions for my   procedure and have received a written copy.  I understand these   instructions and will follow-up with my physician if I have any questions.     __________________________________       _____________________________________  Nurse Signature                                          Patient/Designated   Responsible Party Signature  MD Edilma Rae MD  11/15/2019 9:00:21 AM  [employer]

## 2019-11-15 NOTE — H&P
Short Stay Endoscopy History and Physical    PCP - TUCKER Hernández Jr, MD    Procedure - EGD  ASA - 2  Mallampati - per anesthesia  History of Anesthesia problems - no  Family history Anesthesia problems -  no     HPI:  This is a 49 y.o. male here for evaluation of :   Active Hospital Problems    Diagnosis  POA    *Dysphagia [R13.10]  Yes    Food impaction of esophagus [T18.128A]  No      Resolved Hospital Problems   No resolved problems to display.         Reflux - no  Dysphagia - yes  Abdominal pain - no  Diarrhea - no  Anemia - no  GI bleeding - no    ROS:  CONSTITUTIONAL: Denies weight change,  fatigue, fevers, chills, night sweats.  CARDIOVASCULAR: Denies chest pain, shortness of breath, orthopnea and edema.  RESPIRATORY: Denies cough, hemoptysis, dyspnea, and wheezing.  GI: See HPI.    Medical History:   Past Medical History:   Diagnosis Date    Allergy     Benign heart murmur     Corneal ulcer 01/08/2019    right eye    Hypertension     Nephrolithiasis        Surgical History:   Past Surgical History:   Procedure Laterality Date    SINUS SURGERY      UNDESCENDED TESTICLE EXPLORATION         Family History:  Family History   Problem Relation Age of Onset    Heart disease Father     Cancer Maternal Grandmother     Cancer Maternal Grandfather        Social History:   Social History     Tobacco Use    Smoking status: Never Smoker    Smokeless tobacco: Never Used   Substance Use Topics    Alcohol use: Yes     Comment: occassinally      Drug use: No       Allergy  Review of patient's allergies indicates:  No Known Allergies    Medications:   No current facility-administered medications on file prior to encounter.      Current Outpatient Medications on File Prior to Encounter   Medication Sig Dispense Refill    aspirin (ECOTRIN) 81 MG EC tablet Take 81 mg by mouth once daily.      chlorthalidone (HYGROTEN) 25 MG Tab Take 1 tablet by mouth daily for blood pressure 90 tablet 3    fluticasone  propionate (FLONASE) 50 mcg/actuation nasal spray Instill 2 sprays (100 mcg total) by Each nostril 2 (two) times daily. 16 g 11    losartan (COZAAR) 50 MG tablet Take 1 tablet (50 mg total) by mouth 2 (two) times daily. (for blood pressure). 180 tablet 3    montelukast (SINGULAIR) 10 mg tablet Take 1 tablet (10 mg total) by mouth every evening. 30 tablet 12    sertraline (ZOLOFT) 50 MG tablet Take 1 tablet (50 mg total) by mouth once daily. 30 tablet 11    traZODone (DESYREL) 100 MG tablet Take 1 tablet (100 mg total) by mouth every evening. 30 tablet 11       Physical Exam:  Vital Signs:   Vitals:    11/15/19 0805   BP: (!) 144/67   Pulse: 94   Resp: 18   Temp: 99 °F (37.2 °C)     General Appearance: Well appearing in no acute distress  ENT: OP clear  Chest: CTA B  CV: RRR, no m/r/g  Abd: s/nt/nd/nabs  Ext: no edema    Labs:  Reviewed    IMP:  Active Hospital Problems    Diagnosis  POA    *Dysphagia [R13.10]  Yes    Food impaction of esophagus [T18.128A]  No      Resolved Hospital Problems   No resolved problems to display.         Plan:  I have explained the risks and benefits of endoscopy procedures to the patient including but not limited to bleeding, perforation, infection, and death. The patient wishes to proceed.

## 2019-11-15 NOTE — PLAN OF CARE
Dr Fleming came to bedside and discussed findings. NO N/V,  no abdominal pain, no GI bleeding, and vitals stable.  Pt discharged from unit.

## 2019-11-15 NOTE — DISCHARGE SUMMARY
Endoscopy Discharge Summary      Admit Date: 11/15/2019    Discharge Date and Time:  11/15/2019 9:00 AM    Attending Physician: Edilma Fleming MD     Discharge Physician: Edilma Fleming MD     Principal Admitting Diagnoses: Dysphagia         Discharge Diagnosis: The primary encounter diagnosis was Dysphagia, unspecified type. Diagnoses of Food impaction of esophagus, subsequent encounter and Dysphagia were also pertinent to this visit.     Discharged Condition: Good    Indication for Admission: Dysphagia     Hospital Course: Patient was admitted for an inpatient procedure and tolerated the procedure well with no complications.    Significant Diagnostic Studies: EGD with biopsies    Pathology (if any):  Specimen (12h ago, onward)    None          Estimated Blood Loss: 1 ml.    Discussed with: patient.    Disposition: Home.    Follow Up/Patient Instructions:   Current Discharge Medication List      CONTINUE these medications which have NOT CHANGED    Details   aspirin (ECOTRIN) 81 MG EC tablet Take 81 mg by mouth once daily.      chlorthalidone (HYGROTEN) 25 MG Tab Take 1 tablet by mouth daily for blood pressure  Qty: 90 tablet, Refills: 3    Associated Diagnoses: Essential hypertension      fluticasone propionate (FLONASE) 50 mcg/actuation nasal spray Instill 2 sprays (100 mcg total) by Each nostril 2 (two) times daily.  Qty: 16 g, Refills: 11      losartan (COZAAR) 50 MG tablet Take 1 tablet (50 mg total) by mouth 2 (two) times daily. (for blood pressure).  Qty: 180 tablet, Refills: 3    Comments: May convert to 100 mg qd  Associated Diagnoses: Essential hypertension      montelukast (SINGULAIR) 10 mg tablet Take 1 tablet (10 mg total) by mouth every evening.  Qty: 30 tablet, Refills: 12    Associated Diagnoses: Nasal polyposis; Chronic pansinusitis      sertraline (ZOLOFT) 50 MG tablet Take 1 tablet (50 mg total) by mouth once daily.  Qty: 30 tablet, Refills: 11      traZODone (DESYREL) 100 MG tablet  Take 1 tablet (100 mg total) by mouth every evening.  Qty: 30 tablet, Refills: 11    Associated Diagnoses: Anxiety; Psychophysiological insomnia      Allergy Mix Therapeutic SCIT refill  Qty: 1 Package, Refills: 3             No discharge procedures on file.        Edilma Fleming MD  Gastroenterology and Hepatology

## 2019-11-15 NOTE — ANESTHESIA POSTPROCEDURE EVALUATION
Anesthesia Post Evaluation    Patient: Irineo Betts    Procedure(s) Performed: Procedure(s) (LRB):  EGD (ESOPHAGOGASTRODUODENOSCOPY) (N/A)    Final Anesthesia Type: MAC    Patient location during evaluation: GI PACU  Patient participation: Yes- Able to Participate  Level of consciousness: awake and alert  Post-procedure vital signs: reviewed and stable  Pain management: adequate  Airway patency: patent    PONV status at discharge: No PONV  Anesthetic complications: no      Cardiovascular status: blood pressure returned to baseline  Respiratory status: unassisted, spontaneous ventilation and room air  Hydration status: euvolemic  Follow-up not needed.          Vitals Value Taken Time   /67 11/15/2019  8:05 AM   Temp 37.2 °C (99 °F) 11/15/2019  8:05 AM   Pulse 94 11/15/2019  8:05 AM   Resp 18 11/15/2019  8:05 AM   SpO2 96 % 11/15/2019  8:05 AM         No case tracking events are documented in the log.      Pain/Nadir Score: No data recorded

## 2019-11-15 NOTE — TRANSFER OF CARE
Anesthesia Transfer of Care Note    Patient: Irineo Betts    Procedure(s) Performed: Procedure(s) (LRB):  EGD (ESOPHAGOGASTRODUODENOSCOPY) (N/A)    Patient location: GI    Anesthesia Type: MAC    Transport from OR: Transported from OR on room air with adequate spontaneous ventilation    Post pain: adequate analgesia    Post assessment: no apparent anesthetic complications    Post vital signs: stable    Level of consciousness: awake and alert    Nausea/Vomiting: no nausea/vomiting    Complications: none    Transfer of care protocol was followed      Last vitals:   Visit Vitals  BP (!) 144/67 (BP Location: Left arm, Patient Position: Lying)   Pulse 94   Temp 37.2 °C (99 °F) (Temporal)   Resp 18   Ht 6' (1.829 m)   Wt 103.6 kg (228 lb 6.3 oz)   SpO2 96%   BMI 30.98 kg/m²

## 2019-11-15 NOTE — ANESTHESIA PREPROCEDURE EVALUATION
11/15/2019  Irineo Betts is a 49 y.o., male.    Anesthesia Evaluation    I have reviewed the Patient Summary Reports.    I have reviewed the Nursing Notes.   I have reviewed the Medications.     Review of Systems  Anesthesia Hx:  Denies Hx of Anesthetic complications  Family Hx of Anesthesia complications:   Denies Personal Hx of Anesthesia complications.   Cardiovascular:   Hypertension    Pulmonary:  Pulmonary Normal        Physical Exam  General:  Well nourished       Chest/Lungs:  Chest/Lungs Clear    Heart/Vascular:  Heart Findings: Normal            Anesthesia Plan  Type of Anesthesia, risks & benefits discussed:  Anesthesia Type:  MAC  Patient's Preference:   Intra-op Monitoring Plan:   Intra-op Monitoring Plan Comments:   Post Op Pain Control Plan:   Post Op Pain Control Plan Comments:   Induction:   IV  Beta Blocker:         Informed Consent: Patient understands risks and agrees with Anesthesia plan.  Questions answered.   ASA Score: 2     Day of Surgery Review of History & Physical: I have interviewed and examined the patient. I have reviewed the patient's H&P dated:  There are no significant changes.          Ready For Surgery From Anesthesia Perspective.

## 2019-11-15 NOTE — DISCHARGE INSTRUCTIONS
Duodenitis    The duodenum is the first part of the small intestine, just past the stomach. Duodenitis is inflammation of the lining of the duodenum. This sheet tells you more about the condition.  Causes of duodenitis  The most common cause of duodenitis is infection by Helicobacter pylori (H. pylori) bacteria. Another common cause is long-term use of NSAIDs (such as aspirin and ibuprofen). Celiac disease, an allergy to gluten, causes a particular type of inflammation in the duodenum along with other changes. Less commonly, duodenitis happens along with another health problem, such as Crohn's disease. Drinking alcohol, smoking, or taking certain medicines also may make duodenitis more likely to happen.   Symptoms of duodenitis  The condition may cause no symptoms. If symptoms do happen, they can include:  · Burning, cramping, or hunger-like pain in your stomach  · Gas or a bloated feeling  · Nausea and vomiting  · Feeling full soon after starting a meal  Diagnosing duodenitis  Here is what will be done to diagnose duodenitis:  · If duodenitis is suspected, an upper endoscopy with biopsy will be done to confirm it. During this test, a thin, flexible tube with a light and camera on the end (endoscope) is used. The scope is moved down your throat to the stomach and into the duodenum. The scope sends images of the duodenum to a video screen. Small samples (biopsy) of the lining of the duodenum may be taken. These samples may be sent to a lab for testing for H. pylori.  · To check for H. pylori or other pathogens, a blood, stool, gastric biopsy, or breath test may be done. Samples of blood or stool are taken and tested in a lab. For a breath test, you swallow a harmless compound. If H. pylori bacteria are present, extra carbon dioxide gas can then be detected in your breath.  · To check for celiac disease, blood tests may be done. If positive, an upper endoscopy with biopsy is usually done to confirm the  diagnosis.   · In rare cases, an upper gastrointestinal (GI) series is done. This gives more information about the digestive tract. This procedure takes X-rays of the upper GI tract from the mouth to the small intestine.  Treating duodenitis  Duodenitis is treated using one or more of the following:  · Antibiotic medicines to kill H. pylori  · Medicines to reduce the amount of acid the stomach makes  · Stopping NSAIDs such as aspirin and ibuprofen. However, if you take aspirin for a medical condition, such as heart disease or stroke, do not stop until you check with your prescribing healthcare provider. If you take NSAIDs for arthritis or pain, check with your healthcare provider about alternatives.  · Adopting a gluten-free diet if celiac disease is the cause.  · Avoiding alcohol  · Stopping smoking  Your healthcare provider can tell you more about what treatments are needed.  Recovery and follow-up  With treatment, most cases of duodenitis clear up completely. In rare cases, duodenitis can be an ongoing (chronic) problem or can develop into a duodenal ulcer. If your symptoms do not improve or if they go away and come back, let your healthcare provider know. In such cases, regular healthcare provider visits and treatments are needed to manage your condition.   When to call the healthcare provider  Call your healthcare provider right away if you have any of the following:  · Fever of 100.4°F (38.0°C) or higher, or as advised by your healthcare provider  · Nausea or vomiting (vomit may be bloody or look like coffee grounds)  · Dark, tarry, or bloody stools  · Sudden or severe stomach pain  · Pain that does not improve with treatment  · Rapid weight loss   Date Last Reviewed: 7/1/2016  © 1716-8093 The "[employer]. [address] [place] [address], [place] [address]. All rights reserved. This information is not intended as a substitute for professional medical care. Always follow your healthcare professional's  instructions.        Gastritis (Adult)    Gastritis is inflammation and irritation of the stomach lining. It can be present for a short time (acute) or be long lasting (chronic). Gastritis is often caused by infection with bacteria called H pylori. More than a third of people in the [place] have this bacteria in their bodies. In many cases, H pylori causes no problems or symptoms. In some people, though, the infection irritates the stomach lining and causes gastritis. Other causes of stomach irritation include drinking alcohol or taking pain-relieving medicines called NSAIDs (such as aspirin or ibuprofen).   Symptoms of gastritis can include:  · Abdominal pain or bloating  · Loss of appetite  · Nausea or vomiting  · Vomiting blood or having black stools  · Feeling more tired than usual  An inflamed and irritated stomach lining is more likely to develop a sore called an ulcer. To help prevent this, gastritis should be treated.  Home care  If needed, medicines may be prescribed. If you have H pylori infection, treating it will likely relieve your symptoms. Other changes can help reduce stomach irritation and help it heal.  · If you have been prescribed medicines for H pylori infection, take them as directed. Take all of the medicine until it is finished or your healthcare provider tells you to stop, even if you feel better.  · Your healthcare provider may recommend avoiding NSAIDs. If you take daily aspirin for your heart or other medical reasons, do not stop without talking to your healthcare provider first.  · Avoid drinking alcohol.  · Stop smoking. Smoking can irritate the stomach and delay healing. As much as possible, stay away from second hand smoke.  Follow-up care  Follow up with your healthcare provider, or as advised by our staff. Testing may be needed to check for inflammation or an ulcer.  When to seek medical advice  Call your healthcare provider for any of the following:  · Stomach pain that gets worse or  moves to the lower right abdomen (appendix area)  · Chest pain that appears or gets worse, or spreads to the back, neck, shoulder, or arm  · Frequent vomiting (cant keep down liquids)  · Blood in the stool or vomit (red or black in color)  · Feeling weak or dizzy  · Fever of 100.4ºF (38ºC) or higher, or as directed by your healthcare provider  Date Last Reviewed: 6/22/2015  © 3652-2626 [employer]. [address] [place] [address]. All rights reserved. This information is not intended as a substitute for professional medical care. Always follow your healthcare professional's instructions.

## 2019-11-15 NOTE — OR NURSING
Final time-out done and agreed with all staff.  Pt. Adequately sedated.  See ANESTHESIA  For all medications and vital signs.

## 2019-11-21 ENCOUNTER — CLINICAL SUPPORT (OUTPATIENT)
Dept: OTOLARYNGOLOGY | Facility: CLINIC | Age: 49
End: 2019-11-21
Payer: COMMERCIAL

## 2019-11-21 DIAGNOSIS — J30.9 ALLERGIC RHINITIS, UNSPECIFIED SEASONALITY, UNSPECIFIED TRIGGER: ICD-10-CM

## 2019-11-21 LAB
FINAL PATHOLOGIC DIAGNOSIS: NORMAL
GROSS: NORMAL

## 2019-11-21 PROCEDURE — 95117 IMMUNOTHERAPY INJECTIONS: CPT | Mod: S$GLB,,, | Performed by: OTOLARYNGOLOGY

## 2019-11-21 PROCEDURE — 95117 PR IMMU2THERAPY, 2+ INJECTIONS: ICD-10-PCS | Mod: S$GLB,,, | Performed by: OTOLARYNGOLOGY

## 2019-11-21 PROCEDURE — 99999 PR PBB SHADOW E&M-EST. PATIENT-LVL II: ICD-10-PCS | Mod: PBBFAC,,,

## 2019-11-21 PROCEDURE — 99999 PR PBB SHADOW E&M-EST. PATIENT-LVL II: CPT | Mod: PBBFAC,,,

## 2019-11-21 NOTE — PROGRESS NOTES
Date of treatment initiation: 04/09/2015  Initial SNOT-20 score: 28  Date of last followup visit with referring physician: 06/10/2016  Date next followup visit is due:06/2017  Most recent SNOT-20 score:  0    No Known Drug Allergies    Ordering Physician: Dr. Kurtz      MAINTENANCE VIALS - MANUFACTURED BY [facility]    Mix #1 - LOT# [id number]  Allergens: trees  Mix #2 - LOT# [id number]  Allergens: weeds and grasses  Mix #3 - LOT# [id number]  Allergens: molds, mites, cockroach, jason, dog, feathers      Administered 0.1 mL of red therapeutic vial -  mix #1 and #2 in left arm & mix #3 in right arm subcutaneously at 1030 manufactured by [employer]. Patient reports that he will wait in the building for 20 minutes after injection, he states that he will contact our office with any questions, concerns, or signs of a reaction.

## 2019-12-12 ENCOUNTER — CLINICAL SUPPORT (OUTPATIENT)
Dept: OTOLARYNGOLOGY | Facility: CLINIC | Age: 49
End: 2019-12-12
Payer: COMMERCIAL

## 2019-12-12 DIAGNOSIS — J30.9 ALLERGIC RHINITIS, UNSPECIFIED SEASONALITY, UNSPECIFIED TRIGGER: ICD-10-CM

## 2019-12-12 PROCEDURE — 95117 PR IMMU2THERAPY, 2+ INJECTIONS: ICD-10-PCS | Mod: S$GLB,,, | Performed by: OTOLARYNGOLOGY

## 2019-12-12 PROCEDURE — 95117 IMMUNOTHERAPY INJECTIONS: CPT | Mod: S$GLB,,, | Performed by: OTOLARYNGOLOGY

## 2019-12-12 PROCEDURE — 99999 PR PBB SHADOW E&M-EST. PATIENT-LVL II: CPT | Mod: PBBFAC,,,

## 2019-12-12 PROCEDURE — 99999 PR PBB SHADOW E&M-EST. PATIENT-LVL II: ICD-10-PCS | Mod: PBBFAC,,,

## 2019-12-12 NOTE — PROGRESS NOTES
Date of treatment initiation: 04/09/2015  Initial SNOT-20 score: 28  Date of last followup visit with referring physician: 06/10/2016  Date next followup visit is due:06/2017  Most recent SNOT-20 score:  0    No Known Drug Allergies    Ordering Physician: Dr. Kurtz      MAINTENANCE VIALS - MANUFACTURED BY [facility]    Mix #1 - LOT# [id number]  Allergens: trees  Mix #2 - LOT# [id number]  Allergens: weeds and grasses  Mix #3 - LOT# [id number]  Allergens: molds, mites, cockroach, jason, dog, feathers      Administered 0.2 mL of red therapeutic vial -  mix #1 and #2 in left arm & mix #3 in right arm subcutaneously at 1000 manufactured by [employer]. Patient reports that he will wait in the building for 20 minutes after injection, he states that he will contact our office with any questions, concerns, or signs of a reaction.

## 2019-12-27 ENCOUNTER — PATIENT OUTREACH (OUTPATIENT)
Dept: OTHER | Facility: OTHER | Age: 49
End: 2019-12-27

## 2020-01-07 ENCOUNTER — PATIENT MESSAGE (OUTPATIENT)
Dept: ADMINISTRATIVE | Facility: OTHER | Age: 50
End: 2020-01-07

## 2020-01-09 ENCOUNTER — CLINICAL SUPPORT (OUTPATIENT)
Dept: OTOLARYNGOLOGY | Facility: CLINIC | Age: 50
End: 2020-01-09
Payer: COMMERCIAL

## 2020-01-09 DIAGNOSIS — J30.9 ALLERGIC RHINITIS, UNSPECIFIED SEASONALITY, UNSPECIFIED TRIGGER: ICD-10-CM

## 2020-01-09 PROCEDURE — 99999 PR PBB SHADOW E&M-EST. PATIENT-LVL II: ICD-10-PCS | Mod: PBBFAC,,,

## 2020-01-09 PROCEDURE — 95117 PR IMMU2THERAPY, 2+ INJECTIONS: ICD-10-PCS | Mod: S$GLB,,, | Performed by: OTOLARYNGOLOGY

## 2020-01-09 PROCEDURE — 95117 IMMUNOTHERAPY INJECTIONS: CPT | Mod: S$GLB,,, | Performed by: OTOLARYNGOLOGY

## 2020-01-09 PROCEDURE — 99999 PR PBB SHADOW E&M-EST. PATIENT-LVL II: CPT | Mod: PBBFAC,,,

## 2020-01-09 NOTE — PROGRESS NOTES
Date of treatment initiation: 04/09/2015  Initial SNOT-20 score: 28  Date of last followup visit with referring physician: 06/10/2016  Date next followup visit is due:06/2017  Most recent SNOT-20 score:  0    No Known Drug Allergies    Ordering Physician: Dr. Kurtz      MAINTENANCE VIALS - MANUFACTURED BY [facility]    Mix #1 - LOT# [id number]  Allergens: trees  Mix #2 - LOT# [id number]  Allergens: weeds and grasses  Mix #3 - LOT# [id number]  Allergens: molds, mites, cockroach, jason, dog, feathers      Administered 0.2 mL of red therapeutic vial -  mix #1 and #2 in left arm & mix #3 in right arm subcutaneously at 1020 manufactured by [employer]. Patient reports that he will wait in the building for 20 minutes after injection, he states that he will contact our office with any questions, concerns, or signs of a reaction.

## 2020-01-22 ENCOUNTER — CLINICAL SUPPORT (OUTPATIENT)
Dept: OTOLARYNGOLOGY | Facility: CLINIC | Age: 50
End: 2020-01-22
Payer: COMMERCIAL

## 2020-01-22 DIAGNOSIS — J30.9 ALLERGIC RHINITIS, UNSPECIFIED SEASONALITY, UNSPECIFIED TRIGGER: ICD-10-CM

## 2020-01-22 PROCEDURE — 95117 PR IMMU2THERAPY, 2+ INJECTIONS: ICD-10-PCS | Mod: S$GLB,,, | Performed by: OTOLARYNGOLOGY

## 2020-01-22 PROCEDURE — 99999 PR PBB SHADOW E&M-EST. PATIENT-LVL II: CPT | Mod: PBBFAC,,,

## 2020-01-22 PROCEDURE — 99999 PR PBB SHADOW E&M-EST. PATIENT-LVL II: ICD-10-PCS | Mod: PBBFAC,,,

## 2020-01-22 PROCEDURE — 95117 IMMUNOTHERAPY INJECTIONS: CPT | Mod: S$GLB,,, | Performed by: OTOLARYNGOLOGY

## 2020-01-22 NOTE — PROGRESS NOTES
Date of treatment initiation: 04/09/2015  Initial SNOT-20 score: 28  Date of last followup visit with referring physician: 06/10/2016  Date next followup visit is due:06/2017  Most recent SNOT-20 score:  0    No Known Drug Allergies    Ordering Physician: Dr. Kurtz      MAINTENANCE VIALS - MANUFACTURED BY [facility]    Mix #1 - LOT# [id number]  Allergens: trees  Mix #2 - LOT# [id number]  Allergens: weeds and grasses  Mix #3 - LOT# [id number]  Allergens: molds, mites, cockroach, jason, dog, feathers      Administered 0.25 mL of red therapeutic vial -  mix #1 and #2 in left arm & mix #3 in right arm subcutaneously at 0930 manufactured by [employer]. Patient reports that he will wait in the building for 20 minutes after injection, he states that he will contact our office with any questions, concerns, or signs of a reaction.

## 2020-01-29 NOTE — PROGRESS NOTES
Infrequent BP readings, defer to upcoming health  outreach  BP slightly above goal, average 132/80 mmHg  Labs pending 4/8/20    Hypertension Medications             chlorthalidone (HYGROTEN) 25 MG Tab Take 1 tablet by mouth daily for blood pressure    losartan (COZAAR) 50 MG tablet Take 1 tablet (50 mg total) by mouth 2 (two) times daily. (for blood pressure).

## 2020-01-30 NOTE — PROGRESS NOTES
"Digital Medicine: Health  Follow-Up    Patient reports he and his wife have been working together on increasing exercise and focusing on the Paleo diet.    The history is provided by the patient.     Follow Up  Follow-up reason(s): routine education      Routine Education Topics: eating patterns, meal planning and physical activity        INTERVENTION(S)  recommended diet modifications, recommend physical activity, reviewed monitoring technique and encouragement/support    PLAN  patient verbalizes understanding      There are no preventive care reminders to display for this patient.    Last 5 Patient Entered Readings                                      Current 30 Day Average: 132/80     Recent Readings 1/26/2020 1/22/2020 1/7/2020 12/29/2019 12/19/2019    SBP (mmHg) 124 140 131 121 111    DBP (mmHg) 86 81 74 69 72    Pulse 77 85 72 77 73                      Diet Screening   He cooks for self and has meals prepared by family.      Reports he has been working on portion sizes and not getting "seconds."  States he and his wife meal prep on Sundays.    Intervention(s): portion control, meal planning and reducing sodium intake    Physical Activity Screening   When asked if exercising, patient responded: yes4 day(s) a week.      Patient participates in the following activities: walking    Reports he walks around the neighborhood with his dog every other day.      SDOH  "

## 2020-02-27 ENCOUNTER — CLINICAL SUPPORT (OUTPATIENT)
Dept: OTOLARYNGOLOGY | Facility: CLINIC | Age: 50
End: 2020-02-27
Payer: COMMERCIAL

## 2020-02-27 DIAGNOSIS — J30.9 ALLERGIC RHINITIS, UNSPECIFIED SEASONALITY, UNSPECIFIED TRIGGER: ICD-10-CM

## 2020-02-27 PROCEDURE — 95117 IMMUNOTHERAPY INJECTIONS: CPT | Mod: S$GLB,,, | Performed by: OTOLARYNGOLOGY

## 2020-02-27 PROCEDURE — 99999 PR PBB SHADOW E&M-EST. PATIENT-LVL II: CPT | Mod: PBBFAC,,,

## 2020-02-27 PROCEDURE — 95117 PR IMMU2THERAPY, 2+ INJECTIONS: ICD-10-PCS | Mod: S$GLB,,, | Performed by: OTOLARYNGOLOGY

## 2020-02-27 PROCEDURE — 99999 PR PBB SHADOW E&M-EST. PATIENT-LVL II: ICD-10-PCS | Mod: PBBFAC,,,

## 2020-02-27 NOTE — PROGRESS NOTES
Date of treatment initiation: 04/09/2015  Initial SNOT-20 score: 28  Date of last followup visit with referring physician: 06/10/2016  Date next followup visit is due:06/2017  Most recent SNOT-20 score:  0    No Known Drug Allergies    Ordering Physician: Dr. Kurtz      MAINTENANCE VIALS - MANUFACTURED BY [facility]    Mix #1 - LOT# [id number]  Allergens: trees  Mix #2 - LOT# [id number]  Allergens: weeds and grasses  Mix #3 - LOT# [id number]  Allergens: molds, mites, cockroach, jason, dog, feathers      Administered 0.2 mL of red therapeutic vial -  mix #1 and #2 in left arm & mix #3 in right arm subcutaneously at 1425 manufactured by [employer]. Patient reports that he will wait in the building for 20 minutes after injection, he states that he will contact our office with any questions, concerns, or signs of a reaction.

## 2020-03-03 ENCOUNTER — PATIENT MESSAGE (OUTPATIENT)
Dept: OTOLARYNGOLOGY | Facility: CLINIC | Age: 50
End: 2020-03-03

## 2020-03-12 ENCOUNTER — CLINICAL SUPPORT (OUTPATIENT)
Dept: OTOLARYNGOLOGY | Facility: CLINIC | Age: 50
End: 2020-03-12
Payer: COMMERCIAL

## 2020-03-12 DIAGNOSIS — J30.9 ALLERGIC RHINITIS, UNSPECIFIED SEASONALITY, UNSPECIFIED TRIGGER: ICD-10-CM

## 2020-03-12 PROCEDURE — 95117 PR IMMU2THERAPY, 2+ INJECTIONS: ICD-10-PCS | Mod: S$GLB,,, | Performed by: OTOLARYNGOLOGY

## 2020-03-12 PROCEDURE — 95117 IMMUNOTHERAPY INJECTIONS: CPT | Mod: S$GLB,,, | Performed by: OTOLARYNGOLOGY

## 2020-03-12 PROCEDURE — 99999 PR PBB SHADOW E&M-EST. PATIENT-LVL II: CPT | Mod: PBBFAC,,,

## 2020-03-12 PROCEDURE — 99999 PR PBB SHADOW E&M-EST. PATIENT-LVL II: ICD-10-PCS | Mod: PBBFAC,,,

## 2020-03-12 NOTE — PROGRESS NOTES
Date of treatment initiation: 04/09/2015  Initial SNOT-20 score: 28  Date of last followup visit with referring physician: 06/10/2016  Date next followup visit is due:06/2017  Most recent SNOT-20 score:  0    No Known Drug Allergies    Ordering Physician: Dr. Kurtz      MAINTENANCE VIALS - MANUFACTURED BY [facility]    Mix #1 - LOT# [id number]  Allergens: trees  Mix #2 - LOT# [id number]  Allergens: weeds and grasses  Mix #3 - LOT# [id number]  Allergens: molds, mites, cockroach, jason, dog, feathers      Administered 0.25 mL of red therapeutic vial -  mix #1 and #2 in left arm & mix #3 in right arm subcutaneously at 1040manufactured by [employer]. Patient reports that he will wait in the building for 20 minutes after injection, he states that he will contact our office with any questions, concerns, or signs of a reaction.

## 2020-03-16 DIAGNOSIS — Z63.4 GRIEF AT LOSS OF CHILD: Primary | ICD-10-CM

## 2020-03-16 DIAGNOSIS — F43.21 GRIEF AT LOSS OF CHILD: Primary | ICD-10-CM

## 2020-03-16 RX ORDER — SERTRALINE HYDROCHLORIDE 50 MG/1
50 TABLET, FILM COATED ORAL DAILY
Qty: 90 TABLET | Refills: 3 | Status: SHIPPED | OUTPATIENT
Start: 2020-03-16 | End: 2021-01-11 | Stop reason: SDUPTHER

## 2020-03-16 SDOH — SOCIAL DETERMINANTS OF HEALTH (SDOH): DISSAPEARANCE AND DEATH OF FAMILY MEMBER: Z63.4

## 2020-03-23 ENCOUNTER — PATIENT OUTREACH (OUTPATIENT)
Dept: OTHER | Facility: OTHER | Age: 50
End: 2020-03-23

## 2020-03-26 NOTE — PROGRESS NOTES
Infrequent BP readings, health  addressing  Average 132/86 mmHg  4/8/20 labs may be deferred due to COVID exposure risk concerns    Hypertension Medications             chlorthalidone (HYGROTEN) 25 MG Tab Take 1 tablet by mouth daily for blood pressure    losartan (COZAAR) 50 MG tablet Take 1 tablet (50 mg total) by mouth 2 (two) times daily. (for blood pressure).

## 2020-04-08 DIAGNOSIS — I10 ESSENTIAL HYPERTENSION: ICD-10-CM

## 2020-04-09 RX ORDER — CHLORTHALIDONE 25 MG/1
TABLET ORAL
Qty: 90 TABLET | Refills: 3 | Status: SHIPPED | OUTPATIENT
Start: 2020-04-09 | End: 2021-04-26 | Stop reason: SDUPTHER

## 2020-04-22 NOTE — PROGRESS NOTES
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    The history is provided by the patient.     Follow Up  Follow-up reason(s): reading review and routine education      Readings are trending up       INTERVENTION(S)  encouragement/support and denied questions    PLAN  patient verbalizes understanding and continue monitoring          Topic    Lipid (Cholesterol) Test        Last 5 Patient Entered Readings                                      Current 30 Day Average: 134/83     Recent Readings 4/13/2020 4/8/2020 3/26/2020 3/9/2020 2/16/2020    SBP (mmHg) 140 128 133 132 129    DBP (mmHg) 79 86 85 86 81    Pulse 72 74 76 86 77                  Screenings    SDOH

## 2020-04-28 ENCOUNTER — CLINICAL SUPPORT (OUTPATIENT)
Dept: OTOLARYNGOLOGY | Facility: CLINIC | Age: 50
End: 2020-04-28
Payer: COMMERCIAL

## 2020-04-28 DIAGNOSIS — J30.9 ALLERGIC RHINITIS, UNSPECIFIED SEASONALITY, UNSPECIFIED TRIGGER: ICD-10-CM

## 2020-04-28 PROCEDURE — 95165 ANTIGEN THERAPY SERVICES: CPT | Mod: S$GLB,,, | Performed by: OTOLARYNGOLOGY

## 2020-04-28 PROCEDURE — 95165 PR PROFES SVC,IMMUNOTHER,SINGLE/MULT AGS: ICD-10-PCS | Mod: S$GLB,,, | Performed by: OTOLARYNGOLOGY

## 2020-04-28 PROCEDURE — 95117 IMMUNOTHERAPY INJECTIONS: CPT | Mod: S$GLB,,, | Performed by: OTOLARYNGOLOGY

## 2020-04-28 PROCEDURE — 95117 PR IMMU2THERAPY, 2+ INJECTIONS: ICD-10-PCS | Mod: S$GLB,,, | Performed by: OTOLARYNGOLOGY

## 2020-04-28 PROCEDURE — 99999 PR PBB SHADOW E&M-EST. PATIENT-LVL II: CPT | Mod: PBBFAC,,,

## 2020-04-28 PROCEDURE — 99999 PR PBB SHADOW E&M-EST. PATIENT-LVL II: ICD-10-PCS | Mod: PBBFAC,,,

## 2020-04-29 NOTE — PROGRESS NOTES
Date of treatment initiation: 04/09/2015  Initial SNOT-20 score: 28  Date of last followup visit with referring physician: 06/10/2016  Date next followup visit is due:06/2017  Most recent SNOT-20 score:  0    No Known Drug Allergies    Ordering Physician: Dr. Kurtz      MAINTENANCE VIALS - MANUFACTURED BY [facility]    Mix #1 - LOT# [id number]  Exp: 10/29/20 Allergens: trees  Mix #2 - LOT# [id number]  Exp: 10/29/20 Allergens: weeds and grasses  Mix #3 - LOT# [id number]  Exp: 10/29/20 Allergens: molds, mites, cockroach, jason, dog, feathers      Administered 0.05 mL of red therapeutic vial -  mix #1 and #2 in left arm & mix #3 in right arm subcutaneously at 1225 manufactured by [employer]. Patient reports that he will wait for 20 minutes after injection, he states that he will contact our office with any questions, concerns, or signs of a reaction.     Please note that this is the first dose of this patient's allergen refills - he is expected to receive 66 doses from these vials.

## 2020-05-06 ENCOUNTER — PATIENT OUTREACH (OUTPATIENT)
Dept: OTHER | Facility: OTHER | Age: 50
End: 2020-05-06

## 2020-05-06 NOTE — PROGRESS NOTES
Left voicemail requesting call back    BP readings trending down    Average approaching goal, 132/81 mmHg    Hypertension Medications             chlorthalidone (HYGROTEN) 25 MG Tab Take 1 tablet by mouth daily for blood pressure    losartan (COZAAR) 50 MG tablet Take 1 tablet (50 mg total) by mouth 2 (two) times daily. (for blood pressure).

## 2020-05-13 RX ORDER — PANTOPRAZOLE SODIUM 40 MG/1
40 TABLET, DELAYED RELEASE ORAL DAILY
Qty: 30 TABLET | Refills: 5 | Status: SHIPPED | OUTPATIENT
Start: 2020-05-13 | End: 2020-11-10 | Stop reason: SDUPTHER

## 2020-06-01 DIAGNOSIS — F41.9 ANXIETY: ICD-10-CM

## 2020-06-01 DIAGNOSIS — I10 ESSENTIAL HYPERTENSION: ICD-10-CM

## 2020-06-01 DIAGNOSIS — F51.04 PSYCHOPHYSIOLOGICAL INSOMNIA: ICD-10-CM

## 2020-06-02 RX ORDER — FLUTICASONE PROPIONATE 50 MCG
2 SPRAY, SUSPENSION (ML) NASAL 2 TIMES DAILY
Qty: 16 G | Refills: 11 | Status: SHIPPED | OUTPATIENT
Start: 2020-06-02 | End: 2021-05-26 | Stop reason: SDUPTHER

## 2020-06-02 NOTE — TELEPHONE ENCOUNTER
S/w pt and sched for fasting lab appt 06/04/20 and f/u with Dr. Hernández 06/10/20, pt confirmed appt date/times. Advised pt refill requests sent to Dr. Hernández for auth, pt voiced understanding.    LV 04/10/19  NV 06/10/20

## 2020-06-03 RX ORDER — LOSARTAN POTASSIUM 50 MG/1
50 TABLET ORAL 2 TIMES DAILY
Qty: 180 TABLET | Refills: 3 | Status: SHIPPED | OUTPATIENT
Start: 2020-06-03 | End: 2021-07-09 | Stop reason: SDUPTHER

## 2020-06-03 RX ORDER — TRAZODONE HYDROCHLORIDE 100 MG/1
100 TABLET ORAL NIGHTLY
Qty: 90 TABLET | Refills: 3 | Status: SHIPPED | OUTPATIENT
Start: 2020-06-03 | End: 2021-05-26 | Stop reason: SDUPTHER

## 2020-06-04 ENCOUNTER — LAB VISIT (OUTPATIENT)
Dept: LAB | Facility: HOSPITAL | Age: 50
End: 2020-06-04
Attending: PEDIATRICS
Payer: COMMERCIAL

## 2020-06-04 DIAGNOSIS — E78.00 PURE HYPERCHOLESTEROLEMIA: ICD-10-CM

## 2020-06-04 DIAGNOSIS — I10 ESSENTIAL HYPERTENSION: ICD-10-CM

## 2020-06-04 LAB
ALT SERPL W/O P-5'-P-CCNC: 39 U/L (ref 10–44)
ANION GAP SERPL CALC-SCNC: 9 MMOL/L (ref 8–16)
AST SERPL-CCNC: 23 U/L (ref 10–40)
BUN SERPL-MCNC: 16 MG/DL (ref 6–20)
CALCIUM SERPL-MCNC: 9.6 MG/DL (ref 8.7–10.5)
CHLORIDE SERPL-SCNC: 100 MMOL/L (ref 95–110)
CHOLEST SERPL-MCNC: 207 MG/DL (ref 120–199)
CHOLEST/HDLC SERPL: 5 {RATIO} (ref 2–5)
CO2 SERPL-SCNC: 30 MMOL/L (ref 23–29)
CREAT SERPL-MCNC: 0.9 MG/DL (ref 0.5–1.4)
EST. GFR  (AFRICAN AMERICAN): >60 ML/MIN/1.73 M^2
EST. GFR  (NON AFRICAN AMERICAN): >60 ML/MIN/1.73 M^2
GLUCOSE SERPL-MCNC: 93 MG/DL (ref 70–110)
HDLC SERPL-MCNC: 41 MG/DL (ref 40–75)
HDLC SERPL: 19.8 % (ref 20–50)
LDLC SERPL CALC-MCNC: 145 MG/DL (ref 63–159)
NONHDLC SERPL-MCNC: 166 MG/DL
POTASSIUM SERPL-SCNC: 3.8 MMOL/L (ref 3.5–5.1)
SODIUM SERPL-SCNC: 139 MMOL/L (ref 136–145)
TRIGL SERPL-MCNC: 105 MG/DL (ref 30–150)

## 2020-06-04 PROCEDURE — 84460 ALANINE AMINO (ALT) (SGPT): CPT

## 2020-06-04 PROCEDURE — 36415 COLL VENOUS BLD VENIPUNCTURE: CPT

## 2020-06-04 PROCEDURE — 80061 LIPID PANEL: CPT

## 2020-06-04 PROCEDURE — 84450 TRANSFERASE (AST) (SGOT): CPT

## 2020-06-04 PROCEDURE — 80048 BASIC METABOLIC PNL TOTAL CA: CPT

## 2020-06-10 ENCOUNTER — OFFICE VISIT (OUTPATIENT)
Dept: INTERNAL MEDICINE | Facility: CLINIC | Age: 50
End: 2020-06-10
Payer: COMMERCIAL

## 2020-06-10 VITALS
WEIGHT: 228.19 LBS | TEMPERATURE: 97 F | HEIGHT: 72 IN | HEART RATE: 70 BPM | SYSTOLIC BLOOD PRESSURE: 124 MMHG | OXYGEN SATURATION: 98 % | BODY MASS INDEX: 30.91 KG/M2 | DIASTOLIC BLOOD PRESSURE: 84 MMHG

## 2020-06-10 DIAGNOSIS — K21.00 GERD WITH ESOPHAGITIS: ICD-10-CM

## 2020-06-10 DIAGNOSIS — Z63.4 GRIEF AT LOSS OF CHILD: ICD-10-CM

## 2020-06-10 DIAGNOSIS — E78.00 PURE HYPERCHOLESTEROLEMIA: ICD-10-CM

## 2020-06-10 DIAGNOSIS — Z00.00 WELL ADULT EXAM: Primary | ICD-10-CM

## 2020-06-10 DIAGNOSIS — I10 ESSENTIAL HYPERTENSION: ICD-10-CM

## 2020-06-10 DIAGNOSIS — F43.21 GRIEF AT LOSS OF CHILD: ICD-10-CM

## 2020-06-10 PROCEDURE — 3074F PR MOST RECENT SYSTOLIC BLOOD PRESSURE < 130 MM HG: ICD-10-PCS | Mod: CPTII,S$GLB,, | Performed by: PEDIATRICS

## 2020-06-10 PROCEDURE — 3079F DIAST BP 80-89 MM HG: CPT | Mod: CPTII,S$GLB,, | Performed by: PEDIATRICS

## 2020-06-10 PROCEDURE — 3074F SYST BP LT 130 MM HG: CPT | Mod: CPTII,S$GLB,, | Performed by: PEDIATRICS

## 2020-06-10 PROCEDURE — 99999 PR PBB SHADOW E&M-EST. PATIENT-LVL III: CPT | Mod: PBBFAC,,, | Performed by: PEDIATRICS

## 2020-06-10 PROCEDURE — 99396 PREV VISIT EST AGE 40-64: CPT | Mod: S$GLB,,, | Performed by: PEDIATRICS

## 2020-06-10 PROCEDURE — 3079F PR MOST RECENT DIASTOLIC BLOOD PRESSURE 80-89 MM HG: ICD-10-PCS | Mod: CPTII,S$GLB,, | Performed by: PEDIATRICS

## 2020-06-10 PROCEDURE — 99999 PR PBB SHADOW E&M-EST. PATIENT-LVL III: ICD-10-PCS | Mod: PBBFAC,,, | Performed by: PEDIATRICS

## 2020-06-10 PROCEDURE — 99396 PR PREVENTIVE VISIT,EST,40-64: ICD-10-PCS | Mod: S$GLB,,, | Performed by: PEDIATRICS

## 2020-06-10 SDOH — SOCIAL DETERMINANTS OF HEALTH (SDOH): DISSAPEARANCE AND DEATH OF FAMILY MEMBER: Z63.4

## 2020-06-10 NOTE — PROGRESS NOTES
Subjective:       Patient ID: Irineo Betts is a 50 y.o. male.    Chief Complaint: Annual Exam    HTN: on ARB, doing well, In dig htn clinic, B/P high normal. Weight is up.  Anxiety/grief: on trazodone and sertraline and sleeping well. No HI/SI. Stopped counseling  Lipids: working on D&E, ASCVD has reduced to 3.2 %  GERD with esophagitis: Confirmed by EGD. On PPI with no symptoms.     LABS REVIEWED AND DISCUSSED WITH PATIENT    Review of Systems   Constitutional: Negative for fever and unexpected weight change.   HENT: Positive for congestion (followed by ENT), postnasal drip and rhinorrhea.    Eyes: Negative for discharge and redness.   Respiratory: Negative for cough, shortness of breath and wheezing.    Cardiovascular: Negative for chest pain, palpitations and leg swelling.   Gastrointestinal: Negative for abdominal pain, constipation, diarrhea and vomiting.   Endocrine: Negative for polydipsia, polyphagia and polyuria.   Genitourinary: Negative for decreased urine volume and difficulty urinating.   Musculoskeletal: Negative for arthralgias and joint swelling.   Skin: Negative for rash and wound.   Neurological: Negative for syncope and headaches.   Psychiatric/Behavioral: Negative for behavioral problems, dysphoric mood, self-injury, sleep disturbance and suicidal ideas. The patient is not nervous/anxious.        Objective:      Physical Exam   Constitutional: He is oriented to person, place, and time. He appears well-developed and well-nourished. No distress.   HENT:   Right Ear: External ear normal.   Left Ear: External ear normal.   Nose: Nose normal.   Mouth/Throat: Oropharynx is clear and moist. No oropharyngeal exudate.   Eyes: Pupils are equal, round, and reactive to light. Conjunctivae and EOM are normal.   Neck: Normal range of motion. No JVD present. No thyromegaly present.   Cardiovascular: Normal rate, regular rhythm and normal heart sounds.   No murmur heard.  Pulmonary/Chest: Effort normal  and breath sounds normal. No respiratory distress. He has no wheezes. He has no rales.   Abdominal: Soft. He exhibits no distension and no mass. There is no tenderness. There is no rebound and no guarding.   Musculoskeletal: He exhibits no edema.   Lymphadenopathy:     He has no cervical adenopathy.   Neurological: He is alert and oriented to person, place, and time. No cranial nerve deficit. Coordination normal.   Skin: Capillary refill takes less than 2 seconds. No rash noted.   Psychiatric: He has a normal mood and affect. His behavior is normal. Judgment and thought content normal.       Assessment:       1. Well adult exam    2. Essential hypertension    3. Pure hypercholesterolemia    4. Grief at loss of child    5. GERD with esophagitis        Plan:       Well adult exam    Essential hypertension  -     Basic metabolic panel; Future; Expected date: 06/10/2020    Pure hypercholesterolemia  -     Lipid Panel; Future; Expected date: 06/10/2020  -     ALT (SGPT); Future; Expected date: 06/10/2020  -     AST (SGOT); Future; Expected date: 06/10/2020    Grief at loss of child    GERD with esophagitis    Overall he is stable, he needs to work on 15# weight loss. This should take edge off B/P and lipids. ASCVD risk is 4.1% below statin intervention. He will try to taper off sertraline. Maintain other meds, f/u yearly f/u. Maintain HTN DIG med. HM isssues reviewed.

## 2020-06-16 ENCOUNTER — OFFICE VISIT (OUTPATIENT)
Dept: OPHTHALMOLOGY | Facility: CLINIC | Age: 50
End: 2020-06-16
Payer: COMMERCIAL

## 2020-06-16 ENCOUNTER — OFFICE VISIT (OUTPATIENT)
Dept: DERMATOLOGY | Facility: CLINIC | Age: 50
End: 2020-06-16
Payer: COMMERCIAL

## 2020-06-16 DIAGNOSIS — H53.8 BLURRED VISION, BILATERAL: Primary | ICD-10-CM

## 2020-06-16 DIAGNOSIS — D18.01 CHERRY ANGIOMA: ICD-10-CM

## 2020-06-16 DIAGNOSIS — Z12.83 SCREENING EXAM FOR SKIN CANCER: ICD-10-CM

## 2020-06-16 DIAGNOSIS — H52.13 MYOPIA WITH ASTIGMATISM AND PRESBYOPIA, BILATERAL: ICD-10-CM

## 2020-06-16 DIAGNOSIS — H52.4 MYOPIA WITH ASTIGMATISM AND PRESBYOPIA, BILATERAL: ICD-10-CM

## 2020-06-16 DIAGNOSIS — L91.8 ACROCHORDON: ICD-10-CM

## 2020-06-16 DIAGNOSIS — L82.1 SEBORRHEIC KERATOSIS: ICD-10-CM

## 2020-06-16 DIAGNOSIS — D22.9 MULTIPLE BENIGN NEVI: Primary | ICD-10-CM

## 2020-06-16 DIAGNOSIS — H52.203 MYOPIA WITH ASTIGMATISM AND PRESBYOPIA, BILATERAL: ICD-10-CM

## 2020-06-16 PROCEDURE — 99999 PR PBB SHADOW E&M-EST. PATIENT-LVL III: CPT | Mod: PBBFAC,,, | Performed by: DERMATOLOGY

## 2020-06-16 PROCEDURE — 92015 DETERMINE REFRACTIVE STATE: CPT | Mod: S$GLB,,, | Performed by: OPTOMETRIST

## 2020-06-16 PROCEDURE — 99202 PR OFFICE/OUTPT VISIT, NEW, LEVL II, 15-29 MIN: ICD-10-PCS | Mod: S$GLB,,, | Performed by: DERMATOLOGY

## 2020-06-16 PROCEDURE — 99999 PR PBB SHADOW E&M-EST. PATIENT-LVL II: ICD-10-PCS | Mod: PBBFAC,,, | Performed by: OPTOMETRIST

## 2020-06-16 PROCEDURE — 99999 PR PBB SHADOW E&M-EST. PATIENT-LVL III: ICD-10-PCS | Mod: PBBFAC,,, | Performed by: DERMATOLOGY

## 2020-06-16 PROCEDURE — 92014 PR EYE EXAM, EST PATIENT,COMPREHESV: ICD-10-PCS | Mod: S$GLB,,, | Performed by: OPTOMETRIST

## 2020-06-16 PROCEDURE — 92014 COMPRE OPH EXAM EST PT 1/>: CPT | Mod: S$GLB,,, | Performed by: OPTOMETRIST

## 2020-06-16 PROCEDURE — 92015 PR REFRACTION: ICD-10-PCS | Mod: S$GLB,,, | Performed by: OPTOMETRIST

## 2020-06-16 PROCEDURE — 99202 OFFICE O/P NEW SF 15 MIN: CPT | Mod: S$GLB,,, | Performed by: DERMATOLOGY

## 2020-06-16 PROCEDURE — 99999 PR PBB SHADOW E&M-EST. PATIENT-LVL II: CPT | Mod: PBBFAC,,, | Performed by: OPTOMETRIST

## 2020-06-16 NOTE — PROGRESS NOTES
Subjective:       Patient ID:  Irineo Betts is a 50 y.o. male who presents for   Chief Complaint   Patient presents with    Skin Check     History of Present Illness: The patient presents with chief complaint of moles.  Location: R dorsal hand  Duration: years  Signs/Symptoms: none    Prior treatments: none    Patient complains of lesion(s): skin tag  Location: R medial thigh  Duration: years  Symptoms: none  Relieving factors/Previous treatments: none    Denies personal/family history of skin cancer.  Patient requests skin cancer screening.    Thinks mole to upper abdomen has been present for years and has not changed.      Review of Systems   Skin: Positive for activity-related sunscreen use and recent sunburn. Negative for daily sunscreen use.   Hematologic/Lymphatic: Adenopathy: on ASA. Bruises/bleeds easily.        Objective:    Physical Exam   Constitutional: He appears well-developed and well-nourished. No distress.   Neurological: He is alert and oriented to person, place, and time. He is not disoriented.   Psychiatric: He has a normal mood and affect.   Skin:   Areas Examined (abnormalities noted in diagram):   Scalp / Hair Palpated and Inspected  Head / Face Inspection Performed  Neck Inspection Performed  Chest / Axilla Inspection Performed  Abdomen Inspection Performed  Back Inspection Performed  RUE Inspected  LUE Inspection Performed                   Diagram Legend     Erythematous scaling macule/papule c/w actinic keratosis       Vascular papule c/w angioma      Pigmented verrucoid papule/plaque c/w seborrheic keratosis      Yellow umbilicated papule c/w sebaceous hyperplasia      Irregularly shaped tan macule c/w lentigo     1-2 mm smooth white papules consistent with Milia      Movable subcutaneous cyst with punctum c/w epidermal inclusion cyst      Subcutaneous movable cyst c/w pilar cyst      Firm pink to brown papule c/w dermatofibroma      Pedunculated fleshy papule(s) c/w skin  "tag(s)      Evenly pigmented macule c/w junctional nevus     Mildly variegated pigmented, slightly irregular-bordered macule c/w mildly atypical nevus      Flesh colored to evenly pigmented papule c/w intradermal nevus       Pink pearly papule/plaque c/w basal cell carcinoma      Erythematous hyperkeratotic cursted plaque c/w SCC      Surgical scar with no sign of skin cancer recurrence      Open and closed comedones      Inflammatory papules and pustules      Verrucoid papule consistent consistent with wart     Erythematous eczematous patches and plaques     Dystrophic onycholytic nail with subungual debris c/w onychomycosis     Umbilicated papule    Erythematous-base heme-crusted tan verrucoid plaque consistent with inflamed seborrheic keratosis     Erythematous Silvery Scaling Plaque c/w Psoriasis     See annotation          Assessment / Plan:        Multiple benign nevi  Upper body skin examination performed today including at least 6 points as noted in physical examination. No lesions suspicious for malignancy noted.  Reassurance provided.  Instructed patient to observe lesion(s) for changes and follow up in clinic if changes are noted. Discussed ABCDE's of moles and brochure provided.    Offered biopsy of upper abdomen nevus given criteria of asymmetry, size >6mm, "ugly duckling" sign, but it has been present for many years without changing per patient, so elect to monitor in 3-6 months for any change.    Seborrheic keratosis  These are benign inherited growths without a malignant potential. Reassurance given to patient. No treatment is necessary.     Cherry angioma  This is a benign vascular lesion. Reassurance given. No treatment required.     Screening exam for skin cancer  Upper body skin examination performed today including at least 6 points as noted in physical examination. No lesions suspicious for malignancy noted.    Acrochordon- R scrotum  This is a benign lesion. No further treatment is necessary. "          **20 minutes spent in counseling and coordination of care**      Follow up in about 3 months (around 9/16/2020).

## 2020-06-16 NOTE — PROGRESS NOTES
HPI     Eye Exam     Comments: Yearly              Comments     NP to DNL  Last seen by [facility] on 7/25/19 for blepharitis OD  Patient here today for yearly eye exam  HPI    Any vision changes since last exam: No   Eye pain: No  Other ocular symptoms: No    Do you wear currently wear glasses or contacts? Glasses PAL    Interested in contacts today? No    Do you plan on getting new glasses today? If needed                Last edited by Elizabeth Ann, PCT on 6/16/2020  8:23 AM. (History)              Assessment /Plan     For exam results, see Encounter Report.    Blurred vision, bilateral    Myopia with astigmatism and presbyopia, bilateral      Eyeglass Final Rx     Eyeglass Final Rx       Sphere Cylinder Axis Add    Right -0.75 +1.00 020 +1.75    Left -1.00 +1.00 155 +1.75    Expiration Date: 6/17/2021                RTC 1 yr for undilated eye exam or PRN if any problems.   Discussed above and answered questions.

## 2020-06-16 NOTE — PATIENT INSTRUCTIONS
Monitoring Moles     Don't forget to check your feet.   Moles, also called nevi, are small, colored (pigmented) marks on the skin. They have no known purpose. Many moles appear before age 30, but they also increase frequently as people age. Moles most often are not cancer (benign) and are harmless. But some become cancerous (malignant). Thats why you need to watch the moles on your body and tell your healthcare provider about any that concern you.  What are moles?  Moles are a type of pigmented pedro. Freckles are another type of pigmented pedro. They are often sprinkled across the bridge of the nose, the cheeks, and the arms. Moles can appear on any part of the body. There are many types, sizes, and shapes of moles. Most moles are solid brown. In most cases they are flat or dome-shaped, smooth, and have well-defined edges.  Why worry about moles?  Most moles are benign and dont require treatment. You can have moles removed if you dont like the way they look or feel. But moles may become a problem if they appear after you are 30, or if they change in certain ways. These moles may turn into melanoma, a type of skin cancer. Melanoma is one of the fastest growing cancers in the [place] It is often curable if caught early. But this disease can be life-threatening, particularly when not diagnosed early. Your risk for melanoma is higher if you:  · Have a lot of moles  · Have had more lifetime exposure to the sun  · Have had severe blistering sunburns  · Use tanning beds  · Have a personal or family history of skin cancer  To manage your risk, its smart to check your moles for changes and ask your healthcare provider to do a thorough skin exam when you have a physical exam. To do this, you first need to learn where your moles are. Then, be sure to check your moles each month.  Checking your moles  You can check many of your moles each month. You can do this right after you shower and before you get dressed. Check your  body from head to toe. Then, make a list of your moles. If you find any new moles or changes in your moles, call your healthcare provider. To check your moles, youll need:  · A full-length mirror  · A stool or chair to sit on while you check your feet  If you have a lot of moles, take digital photos of them. Make sure to take photos both up close and from a distance. These can help you see if any moles change over time.  When to seek medical treatment  See your healthcare provider if your moles hurt, itch, ooze, bleed, thicken, become crusty, or show other changes. Also, be sure to call your health care provider if your moles show any of the following signs of melanoma:  · A change in size, shape, color, or height  · The sides dont match (asymmetry)  · Ragged, notched, or blurred borders  · Different colors within the same mole  · Size is larger than 5 mm or 6 mm in diameter (the size of a pencil eraser)  Date Last Reviewed: 2/1/2017 © 2000-2017 [employer]. [address] [place] [address]. All rights reserved. This information is not intended as a substitute for professional medical care. Always follow your healthcare professional's instructions.

## 2020-06-17 ENCOUNTER — PATIENT OUTREACH (OUTPATIENT)
Dept: OTHER | Facility: OTHER | Age: 50
End: 2020-06-17

## 2020-07-01 ENCOUNTER — CLINICAL SUPPORT (OUTPATIENT)
Dept: OTOLARYNGOLOGY | Facility: CLINIC | Age: 50
End: 2020-07-01
Payer: COMMERCIAL

## 2020-07-01 DIAGNOSIS — J30.9 ALLERGIC RHINITIS, UNSPECIFIED SEASONALITY, UNSPECIFIED TRIGGER: ICD-10-CM

## 2020-07-01 PROCEDURE — 99999 PR PBB SHADOW E&M-EST. PATIENT-LVL II: ICD-10-PCS | Mod: PBBFAC,,,

## 2020-07-01 PROCEDURE — 99999 PR PBB SHADOW E&M-EST. PATIENT-LVL II: CPT | Mod: PBBFAC,,,

## 2020-07-01 PROCEDURE — 99499 UNLISTED E&M SERVICE: CPT | Mod: S$GLB,,, | Performed by: OTOLARYNGOLOGY

## 2020-07-01 PROCEDURE — 99499 NO LOS: ICD-10-PCS | Mod: S$GLB,,, | Performed by: OTOLARYNGOLOGY

## 2020-07-01 PROCEDURE — 95117 IMMUNOTHERAPY INJECTIONS: CPT | Mod: S$GLB,,, | Performed by: OTOLARYNGOLOGY

## 2020-07-01 PROCEDURE — 95117 PR IMMU2THERAPY, 2+ INJECTIONS: ICD-10-PCS | Mod: S$GLB,,, | Performed by: OTOLARYNGOLOGY

## 2020-07-01 NOTE — PROGRESS NOTES
Date of treatment initiation: 04/09/2015  Initial SNOT-20 score: 28  Date of last followup visit with referring physician: 06/10/2016  Date next followup visit is due:06/2017  Most recent SNOT-20 score:  0    No Known Drug Allergies    Ordering Physician: Dr. Kurtz      MAINTENANCE VIALS - MANUFACTURED BY [facility]    Mix #1 - LOT# [id number]  Exp: 10/29/20 Allergens: trees  Mix #2 - LOT# [id number]  Exp: 10/29/20 Allergens: weeds and grasses  Mix #3 - LOT# [id number]  Exp: 10/29/20 Allergens: molds, mites, cockroach, jason, dog, feathers      Administered 0.2 mL of red therapeutic vial -  mix #1 and #2 in left arm & mix #3 in right arm subcutaneously at 1355 manufactured by [employer]. Patient reports that he will wait for 20 minutes after injection, he states that he will contact our office with any questions, concerns, or signs of a reaction.     Please note that this is the first dose of this patient's allergen refills - he is expected to receive 66 doses from these vials.

## 2020-07-11 DIAGNOSIS — J33.9 NASAL POLYPOSIS: ICD-10-CM

## 2020-07-11 DIAGNOSIS — J32.4 CHRONIC PANSINUSITIS: ICD-10-CM

## 2020-07-13 RX ORDER — MONTELUKAST SODIUM 10 MG/1
10 TABLET ORAL NIGHTLY
Qty: 30 TABLET | Refills: 12 | Status: SHIPPED | OUTPATIENT
Start: 2020-07-13 | End: 2021-07-29 | Stop reason: SDUPTHER

## 2020-07-23 ENCOUNTER — PATIENT OUTREACH (OUTPATIENT)
Dept: OTHER | Facility: OTHER | Age: 50
End: 2020-07-23

## 2020-07-30 NOTE — PROGRESS NOTES
Infrequent BP readings but most recent close to goal at 131/81 mmHg  Health  attempting contact  Seen by Dr. Hernández 6/10/20 and encouraged to lose 15 pounds. BP in office 124/84 mmHg.  Defer outreach    BP average 135/84 mmHg

## 2020-08-12 ENCOUNTER — CLINICAL SUPPORT (OUTPATIENT)
Dept: OTOLARYNGOLOGY | Facility: CLINIC | Age: 50
End: 2020-08-12
Payer: COMMERCIAL

## 2020-08-12 DIAGNOSIS — J30.9 ALLERGIC RHINITIS, UNSPECIFIED SEASONALITY, UNSPECIFIED TRIGGER: ICD-10-CM

## 2020-08-12 PROCEDURE — 95117 PR IMMU2THERAPY, 2+ INJECTIONS: ICD-10-PCS | Mod: S$GLB,,, | Performed by: OTOLARYNGOLOGY

## 2020-08-12 PROCEDURE — 95117 IMMUNOTHERAPY INJECTIONS: CPT | Mod: S$GLB,,, | Performed by: OTOLARYNGOLOGY

## 2020-08-12 PROCEDURE — 99499 NO LOS: ICD-10-PCS | Mod: S$GLB,,, | Performed by: OTOLARYNGOLOGY

## 2020-08-12 PROCEDURE — 99999 PR PBB SHADOW E&M-EST. PATIENT-LVL II: ICD-10-PCS | Mod: PBBFAC,,,

## 2020-08-12 PROCEDURE — 99499 UNLISTED E&M SERVICE: CPT | Mod: S$GLB,,, | Performed by: OTOLARYNGOLOGY

## 2020-08-12 PROCEDURE — 99999 PR PBB SHADOW E&M-EST. PATIENT-LVL II: CPT | Mod: PBBFAC,,,

## 2020-08-12 NOTE — PROGRESS NOTES
Date of treatment initiation: 04/09/2015  Initial SNOT-20 score: 28  Date of last followup visit with referring physician: 06/10/2016  Date next followup visit is due:06/2017  Most recent SNOT-20 score:  0    No Known Drug Allergies    Ordering Physician: Dr. Kurtz      MAINTENANCE VIALS - MANUFACTURED BY [facility]    Mix #1 - LOT# [id number]  Exp: 10/29/20 Allergens: trees  Mix #2 - LOT# [id number]  Exp: 10/29/20 Allergens: weeds and grasses  Mix #3 - LOT# [id number]  Exp: 10/29/20 Allergens: molds, mites, cockroach, jason, dog, feathers      Administered 0.25 mL of red therapeutic vial -  mix #1 and #2 in left arm & mix #3 in right arm subcutaneously at 0900 manufactured by [employer]. Patient reports that he will wait for 20 minutes after injection, he states that he will contact our office with any questions, concerns, or signs of a reaction.     Please note that this is the first dose of this patient's allergen refills - he is expected to receive 66 doses from these vials.

## 2020-08-26 ENCOUNTER — CLINICAL SUPPORT (OUTPATIENT)
Dept: OTOLARYNGOLOGY | Facility: CLINIC | Age: 50
End: 2020-08-26
Payer: COMMERCIAL

## 2020-08-26 DIAGNOSIS — J30.9 ALLERGIC RHINITIS, UNSPECIFIED SEASONALITY, UNSPECIFIED TRIGGER: ICD-10-CM

## 2020-08-26 PROCEDURE — 95117 PR IMMU2THERAPY, 2+ INJECTIONS: ICD-10-PCS | Mod: S$GLB,,, | Performed by: OTOLARYNGOLOGY

## 2020-08-26 PROCEDURE — 99499 UNLISTED E&M SERVICE: CPT | Mod: S$GLB,,, | Performed by: OTOLARYNGOLOGY

## 2020-08-26 PROCEDURE — 99999 PR PBB SHADOW E&M-EST. PATIENT-LVL II: ICD-10-PCS | Mod: PBBFAC,,,

## 2020-08-26 PROCEDURE — 99999 PR PBB SHADOW E&M-EST. PATIENT-LVL II: CPT | Mod: PBBFAC,,,

## 2020-08-26 PROCEDURE — 95117 IMMUNOTHERAPY INJECTIONS: CPT | Mod: S$GLB,,, | Performed by: OTOLARYNGOLOGY

## 2020-08-26 PROCEDURE — 99499 NO LOS: ICD-10-PCS | Mod: S$GLB,,, | Performed by: OTOLARYNGOLOGY

## 2020-08-26 NOTE — PROGRESS NOTES
Date of treatment initiation: 04/09/2015  Initial SNOT-20 score: 28  Date of last followup visit with referring physician: 06/10/2016  Date next followup visit is due:06/2017  Most recent SNOT-20 score:  0    No Known Drug Allergies    Ordering Physician: Dr. Kurtz      MAINTENANCE VIALS - MANUFACTURED BY [facility]    Mix #1 - LOT# [id number]  Exp: 10/29/20 Allergens: trees  Mix #2 - LOT# [id number]  Exp: 10/29/20 Allergens: weeds and grasses  Mix #3 - LOT# [id number]  Exp: 10/29/20 Allergens: molds, mites, cockroach, jason, dog, feathers      Administered 0.3 mL of red therapeutic vial -  mix #1 and #2 in left arm & mix #3 in right arm subcutaneously at 1020 manufactured by [employer]. Patient reports that he will wait for 20 minutes after injection, he states that he will contact our office with any questions, concerns, or signs of a reaction.     Please note that this is the first dose of this patient's allergen refills - he is expected to receive 66 doses from these vials.

## 2020-09-23 NOTE — PROGRESS NOTES
Digital Medicine: Clinician Follow-Up    Patient states he is feeling well    The history is provided by the patient.      Review of patient's allergies indicates:  No Known Allergies  Follow-up reason(s): routine follow up.     Patient is not experiencing signs/symptoms of hypotension.  Patient is not experiencing signs/symptoms of hypertension.          Last 5 Patient Entered Readings                                      Current 30 Day Average: 129/80     Recent Readings 9/20/2020 9/2/2020 8/20/2020 8/10/2020 7/27/2020    SBP (mmHg) 131 126 117 131 131    DBP (mmHg) 81 79 82 78 81    Pulse 75 75 75 72 67                 Depression Screening  Did not address depression screening.    Sleep Apnea Screening    Did not address sleep apnea screening.     Medication Affordability Screening  Did not address medication affordability screening.     Medication Adherence-Medication adherence was assessed.        Denies missed doses      ASSESSMENT(S)  Patients BP average is 129/80 mmHg, which is at goal. Patient's BP goal is less than or equal to 130/80 per 2017 ACC/AHA Hypertension Guidelines.    6/4/20 BMP reviewed      Hypertension Plan  Continue current therapy.       Addressed patient questions and patient has my contact information if needed prior to next outreach. Patient verbalizes understanding.            There are no preventive care reminders to display for this patient.  There are no preventive care reminders to display for this patient.    Hypertension Medications             chlorthalidone (HYGROTEN) 25 MG Tab Take 1 tablet by mouth daily for blood pressure    losartan (COZAAR) 50 MG tablet Take 1 tablet (50 mg total) by mouth 2 (two) times daily. (for blood pressure).

## 2020-10-06 ENCOUNTER — OFFICE VISIT (OUTPATIENT)
Dept: DERMATOLOGY | Facility: CLINIC | Age: 50
End: 2020-10-06
Payer: COMMERCIAL

## 2020-10-06 DIAGNOSIS — D48.5 NEOPLASM OF UNCERTAIN BEHAVIOR OF SKIN: Primary | ICD-10-CM

## 2020-10-06 PROCEDURE — 11102 TANGNTL BX SKIN SINGLE LES: CPT | Mod: S$GLB,,, | Performed by: DERMATOLOGY

## 2020-10-06 PROCEDURE — 88342 CHG IMMUNOCYTOCHEMISTRY: ICD-10-PCS | Mod: 26,,, | Performed by: PATHOLOGY

## 2020-10-06 PROCEDURE — 88305 TISSUE EXAM BY PATHOLOGIST: CPT | Mod: 26,,, | Performed by: PATHOLOGY

## 2020-10-06 PROCEDURE — 99999 PR PBB SHADOW E&M-EST. PATIENT-LVL III: ICD-10-PCS | Mod: PBBFAC,,, | Performed by: DERMATOLOGY

## 2020-10-06 PROCEDURE — 88305 TISSUE EXAM BY PATHOLOGIST: ICD-10-PCS | Mod: 26,,, | Performed by: PATHOLOGY

## 2020-10-06 PROCEDURE — 88342 IMHCHEM/IMCYTCHM 1ST ANTB: CPT | Mod: 26,,, | Performed by: PATHOLOGY

## 2020-10-06 PROCEDURE — 99999 PR PBB SHADOW E&M-EST. PATIENT-LVL III: CPT | Mod: PBBFAC,,, | Performed by: DERMATOLOGY

## 2020-10-06 PROCEDURE — 99499 UNLISTED E&M SERVICE: CPT | Mod: S$GLB,,, | Performed by: DERMATOLOGY

## 2020-10-06 PROCEDURE — 11102 PR TANGENTIAL BIOPSY, SKIN, SINGLE LESION: ICD-10-PCS | Mod: S$GLB,,, | Performed by: DERMATOLOGY

## 2020-10-06 PROCEDURE — 88342 IMHCHEM/IMCYTCHM 1ST ANTB: CPT | Performed by: PATHOLOGY

## 2020-10-06 PROCEDURE — 88305 TISSUE EXAM BY PATHOLOGIST: CPT | Performed by: PATHOLOGY

## 2020-10-06 PROCEDURE — 99499 NO LOS: ICD-10-PCS | Mod: S$GLB,,, | Performed by: DERMATOLOGY

## 2020-10-06 NOTE — PROGRESS NOTES
Subjective:       Patient ID:  Irineo Betts is a 50 y.o. male who presents for   Chief Complaint   Patient presents with    Follow-up     History of Present Illness: The patient presents for follow up of mole.    The patient was last seen on: 6/16/2020 for FBSE and at that time elected to monitor nevus to abdomen. Returns to day for re-eval and he would like it removed. Does not think it has changed much. Denies any new/changing/growing/bleeding/painful lesions.    Other skin complaints: none        Review of Systems   Skin: Positive for activity-related sunscreen use. Negative for daily sunscreen use and recent sunburn.   Hematologic/Lymphatic: Bruises/bleeds easily (on ASA 81mg daily).        Objective:    Physical Exam   Constitutional: He appears well-developed and well-nourished. No distress.   Neurological: He is alert and oriented to person, place, and time. He is not disoriented.   Psychiatric: He has a normal mood and affect.   Skin:   Areas Examined (abnormalities noted in diagram):   Abdomen Inspection Performed              Diagram Legend     Erythematous scaling macule/papule c/w actinic keratosis       Vascular papule c/w angioma      Pigmented verrucoid papule/plaque c/w seborrheic keratosis      Yellow umbilicated papule c/w sebaceous hyperplasia      Irregularly shaped tan macule c/w lentigo     1-2 mm smooth white papules consistent with Milia      Movable subcutaneous cyst with punctum c/w epidermal inclusion cyst      Subcutaneous movable cyst c/w pilar cyst      Firm pink to brown papule c/w dermatofibroma      Pedunculated fleshy papule(s) c/w skin tag(s)      Evenly pigmented macule c/w junctional nevus     Mildly variegated pigmented, slightly irregular-bordered macule c/w mildly atypical nevus      Flesh colored to evenly pigmented papule c/w intradermal nevus       Pink pearly papule/plaque c/w basal cell carcinoma      Erythematous hyperkeratotic cursted plaque c/w SCC       Surgical scar with no sign of skin cancer recurrence      Open and closed comedones      Inflammatory papules and pustules      Verrucoid papule consistent consistent with wart     Erythematous eczematous patches and plaques     Dystrophic onycholytic nail with subungual debris c/w onychomycosis     Umbilicated papule    Erythematous-base heme-crusted tan verrucoid plaque consistent with inflamed seborrheic keratosis     Erythematous Silvery Scaling Plaque c/w Psoriasis     See annotation          Assessment / Plan:      Pathology Orders:     Normal Orders This Visit    Specimen to Pathology, Dermatology     Questions:    Procedure Type: Dermatology and skin neoplasms    Number of Specimens: 1    ------------------------: -------------------------    Spec 1 Procedure: Biopsy    Spec 1 Clinical Impression: nevus r/o atypia    Spec 1 Source: abdomen        Neoplasm of uncertain behavior of skin  -     Specimen to Pathology, Dermatology    Shave biopsy procedure note:    Shave biopsy performed after verbal consent including risk of infection, scar, recurrence, need for additional treatment of site. Area prepped with alcohol, anesthetized with approximately 1.0cc of 1% lidocaine with epinephrine. Lesional tissue shaved with razor blade. Hemostasis achieved with application of aluminum chloride followed by hyfrecation. No complications. Dressing applied. Wound care explained.             Follow up in about 1 year (around 10/6/2021).

## 2020-10-06 NOTE — PATIENT INSTRUCTIONS

## 2020-10-12 LAB
FINAL PATHOLOGIC DIAGNOSIS: NORMAL
GROSS: NORMAL
MICROSCOPIC EXAM: NORMAL

## 2020-11-02 ENCOUNTER — PATIENT OUTREACH (OUTPATIENT)
Dept: OTHER | Facility: OTHER | Age: 50
End: 2020-11-02

## 2020-11-04 ENCOUNTER — IMMUNIZATION (OUTPATIENT)
Dept: PHARMACY | Facility: CLINIC | Age: 50
End: 2020-11-04
Payer: COMMERCIAL

## 2020-11-04 ENCOUNTER — CLINICAL SUPPORT (OUTPATIENT)
Dept: OTOLARYNGOLOGY | Facility: CLINIC | Age: 50
End: 2020-11-04
Payer: COMMERCIAL

## 2020-11-04 ENCOUNTER — PATIENT MESSAGE (OUTPATIENT)
Dept: ADMINISTRATIVE | Facility: OTHER | Age: 50
End: 2020-11-04

## 2020-11-04 DIAGNOSIS — J30.9 ALLERGIC RHINITIS, UNSPECIFIED SEASONALITY, UNSPECIFIED TRIGGER: Primary | ICD-10-CM

## 2020-11-04 PROCEDURE — 95117 PR IMMU2THERAPY, 2+ INJECTIONS: ICD-10-PCS | Mod: S$GLB,,, | Performed by: OTOLARYNGOLOGY

## 2020-11-04 PROCEDURE — 99499 UNLISTED E&M SERVICE: CPT | Mod: S$GLB,,, | Performed by: OTOLARYNGOLOGY

## 2020-11-04 PROCEDURE — 95117 IMMUNOTHERAPY INJECTIONS: CPT | Mod: S$GLB,,, | Performed by: OTOLARYNGOLOGY

## 2020-11-04 PROCEDURE — 99999 PR PBB SHADOW E&M-EST. PATIENT-LVL II: ICD-10-PCS | Mod: PBBFAC,,,

## 2020-11-04 PROCEDURE — 99999 PR PBB SHADOW E&M-EST. PATIENT-LVL II: CPT | Mod: PBBFAC,,,

## 2020-11-04 PROCEDURE — 99499 NO LOS: ICD-10-PCS | Mod: S$GLB,,, | Performed by: OTOLARYNGOLOGY

## 2020-11-04 NOTE — PROGRESS NOTES
Date of treatment initiation: 04/09/2015  Initial SNOT-20 score: 28  Date of last followup visit with referring physician: 06/10/2016  Date next followup visit is due:06/2017  Most recent SNOT-20 score:  0    No Known Drug Allergies    Ordering Physician: Dr. Kurtz      MAINTENANCE VIALS - MANUFACTURED BY [facility]    Mix #1 - LOT# [id number]  Exp: 10/04/21 Allergens: trees  Mix #2 - LOT# [id number]  Exp: 10/04/21 Allergens: weeds and grasses  Mix #3 - LOT# [id number]  Exp: 10/04/21 Allergens: molds, mites, cockroach, jason, dog, feathers      Administered 0.05 mL of red therapeutic vial -  mix #1 and #2 in left arm & mix #3 in right arm subcutaneously at 1015 manufactured by [employer]. Patient reports that he will wait for 20 minutes after injection, he states that he will contact our office with any questions, concerns, or signs of a reaction.     Please note that this is the first dose of this patient's allergen refills - he is expected to receive 66 doses from these vials.

## 2020-11-09 ENCOUNTER — PATIENT MESSAGE (OUTPATIENT)
Dept: GASTROENTEROLOGY | Facility: CLINIC | Age: 50
End: 2020-11-09

## 2020-11-10 RX ORDER — PANTOPRAZOLE SODIUM 40 MG/1
40 TABLET, DELAYED RELEASE ORAL DAILY
Qty: 30 TABLET | Refills: 6 | Status: SHIPPED | OUTPATIENT
Start: 2020-11-10 | End: 2021-07-29 | Stop reason: SDUPTHER

## 2020-11-25 ENCOUNTER — CLINICAL SUPPORT (OUTPATIENT)
Dept: OTOLARYNGOLOGY | Facility: CLINIC | Age: 50
End: 2020-11-25
Payer: COMMERCIAL

## 2020-11-25 DIAGNOSIS — J30.9 ALLERGIC RHINITIS, UNSPECIFIED SEASONALITY, UNSPECIFIED TRIGGER: ICD-10-CM

## 2020-11-25 PROCEDURE — 99999 PR PBB SHADOW E&M-EST. PATIENT-LVL II: ICD-10-PCS | Mod: PBBFAC,,,

## 2020-11-25 PROCEDURE — 99499 UNLISTED E&M SERVICE: CPT | Mod: S$GLB,,, | Performed by: OTOLARYNGOLOGY

## 2020-11-25 PROCEDURE — 99499 NO LOS: ICD-10-PCS | Mod: S$GLB,,, | Performed by: OTOLARYNGOLOGY

## 2020-11-25 PROCEDURE — 99999 PR PBB SHADOW E&M-EST. PATIENT-LVL II: CPT | Mod: PBBFAC,,,

## 2020-11-25 PROCEDURE — 95117 PR IMMU2THERAPY, 2+ INJECTIONS: ICD-10-PCS | Mod: S$GLB,,, | Performed by: OTOLARYNGOLOGY

## 2020-11-25 PROCEDURE — 95117 IMMUNOTHERAPY INJECTIONS: CPT | Mod: S$GLB,,, | Performed by: OTOLARYNGOLOGY

## 2020-11-25 NOTE — PROGRESS NOTES
Date of treatment initiation: 04/09/2015  Initial SNOT-20 score: 28  Date of last followup visit with referring physician: 06/10/2016  Date next followup visit is due:06/2017  Most recent SNOT-20 score:  0    No Known Drug Allergies    Ordering Physician: Dr. Kurtz      MAINTENANCE VIALS - MANUFACTURED BY [facility]    Mix #1 - LOT# [id number]  Exp: 10/04/21 Allergens: trees  Mix #2 - LOT# [id number]  Exp: 10/04/21 Allergens: weeds and grasses  Mix #3 - LOT# [id number]  Exp: 10/04/21 Allergens: molds, mites, cockroach, jason, dog, feathers      Administered 0.1 mL of red therapeutic vial -  mix #1 and #2 in left arm & mix #3 in right arm subcutaneously at 0810 manufactured by [employer]. Patient reports that he will wait for 20 minutes after injection, he states that he will contact our office with any questions, concerns, or signs of a reaction.     Please note that this is the first dose of this patient's allergen refills - he is expected to receive 66 doses from these vials.

## 2020-11-30 NOTE — PROGRESS NOTES
Digital Medicine: Health  Follow-Up    The history is provided by the patient.                 Patient needs assistance troubleshooting: patient reminder needed.    Additional Follow-up details: Patient reports he is doing well.  States he plans to take a BP reading today.            Diet-Not assessed          Physical Activity-Not assessed    Medication Adherence-Medication Adherence not addressed.      Substance, Sleep, Stress-Not assessed      Additional monitoring needed.  Continue current diet/physical activity routine.       Addressed patient questions and patient has my contact information if needed prior to next outreach. Patient verbalizes understanding.      Explained the importance of self-monitoring and medication adherence. Encouraged the patient to communicate with their health  for lifestyle modifications to help improve or maintain a healthy lifestyle.               There are no preventive care reminders to display for this patient.      Last 5 Patient Entered Readings                                      Current 30 Day Average: 135/84     Recent Readings 11/17/2020 11/16/2020 10/27/2020 10/15/2020 10/7/2020    SBP (mmHg) 135 135 127 129 133    DBP (mmHg) 92 75 85 86 75    Pulse 75 84 80 80 70

## 2020-12-16 ENCOUNTER — CLINICAL SUPPORT (OUTPATIENT)
Dept: OTOLARYNGOLOGY | Facility: CLINIC | Age: 50
End: 2020-12-16
Payer: COMMERCIAL

## 2020-12-16 DIAGNOSIS — J30.9 ALLERGIC RHINITIS, UNSPECIFIED SEASONALITY, UNSPECIFIED TRIGGER: ICD-10-CM

## 2020-12-16 PROCEDURE — 99499 UNLISTED E&M SERVICE: CPT | Mod: S$GLB,,, | Performed by: OTOLARYNGOLOGY

## 2020-12-16 PROCEDURE — 99999 PR PBB SHADOW E&M-EST. PATIENT-LVL II: ICD-10-PCS | Mod: PBBFAC,,,

## 2020-12-16 PROCEDURE — 99499 NO LOS: ICD-10-PCS | Mod: S$GLB,,, | Performed by: OTOLARYNGOLOGY

## 2020-12-16 PROCEDURE — 95117 IMMUNOTHERAPY INJECTIONS: CPT | Mod: S$GLB,,, | Performed by: OTOLARYNGOLOGY

## 2020-12-16 PROCEDURE — 95117 PR IMMU2THERAPY, 2+ INJECTIONS: ICD-10-PCS | Mod: S$GLB,,, | Performed by: OTOLARYNGOLOGY

## 2020-12-16 PROCEDURE — 99999 PR PBB SHADOW E&M-EST. PATIENT-LVL II: CPT | Mod: PBBFAC,,,

## 2020-12-16 NOTE — PROGRESS NOTES
Date of treatment initiation: 04/09/2015  Initial SNOT-20 score: 28  Date of last followup visit with referring physician: 06/10/2016  Date next followup visit is due:06/2017  Most recent SNOT-20 score:  0    No Known Drug Allergies    Ordering Physician: Dr. Kurtz      MAINTENANCE VIALS - MANUFACTURED BY [facility]    Mix #1 - LOT# [id number]  Exp: 10/04/21 Allergens: trees  Mix #2 - LOT# [id number]  Exp: 10/04/21 Allergens: weeds and grasses  Mix #3 - LOT# [id number]  Exp: 10/04/21 Allergens: molds, mites, cockroach, jason, dog, feathers      Administered 0.15 mL of red therapeutic vial -  mix #1 and #2 in left arm & mix #3 in right arm subcutaneously at 0750 manufactured by [employer]. Patient reports that he will wait for 20 minutes after injection, he states that he will contact our office with any questions, concerns, or signs of a reaction.     Please note that this is the first dose of this patient's allergen refills - he is expected to receive 66 doses from these vials.

## 2021-01-06 ENCOUNTER — CLINICAL SUPPORT (OUTPATIENT)
Dept: OTOLARYNGOLOGY | Facility: CLINIC | Age: 51
End: 2021-01-06
Payer: COMMERCIAL

## 2021-01-06 DIAGNOSIS — J30.9 ALLERGIC RHINITIS, UNSPECIFIED SEASONALITY, UNSPECIFIED TRIGGER: ICD-10-CM

## 2021-01-06 PROCEDURE — 95117 IMMUNOTHERAPY INJECTIONS: CPT | Mod: S$GLB,,, | Performed by: OTOLARYNGOLOGY

## 2021-01-06 PROCEDURE — 99999 PR PBB SHADOW E&M-EST. PATIENT-LVL II: ICD-10-PCS | Mod: PBBFAC,,,

## 2021-01-06 PROCEDURE — 99999 PR PBB SHADOW E&M-EST. PATIENT-LVL II: CPT | Mod: PBBFAC,,,

## 2021-01-06 PROCEDURE — 95117 PR IMMU2THERAPY, 2+ INJECTIONS: ICD-10-PCS | Mod: S$GLB,,, | Performed by: OTOLARYNGOLOGY

## 2021-01-06 PROCEDURE — 99499 NO LOS: ICD-10-PCS | Mod: S$GLB,,, | Performed by: OTOLARYNGOLOGY

## 2021-01-06 PROCEDURE — 99499 UNLISTED E&M SERVICE: CPT | Mod: S$GLB,,, | Performed by: OTOLARYNGOLOGY

## 2021-01-11 DIAGNOSIS — F43.21 GRIEF AT LOSS OF CHILD: ICD-10-CM

## 2021-01-11 DIAGNOSIS — Z63.4 GRIEF AT LOSS OF CHILD: ICD-10-CM

## 2021-01-11 SDOH — SOCIAL DETERMINANTS OF HEALTH (SDOH): DISSAPEARANCE AND DEATH OF FAMILY MEMBER: Z63.4

## 2021-01-13 RX ORDER — SERTRALINE HYDROCHLORIDE 50 MG/1
50 TABLET, FILM COATED ORAL DAILY
Qty: 90 TABLET | Refills: 3 | Status: SHIPPED | OUTPATIENT
Start: 2021-01-13 | End: 2022-02-06 | Stop reason: SDUPTHER

## 2021-01-20 ENCOUNTER — CLINICAL SUPPORT (OUTPATIENT)
Dept: OTOLARYNGOLOGY | Facility: CLINIC | Age: 51
End: 2021-01-20
Payer: COMMERCIAL

## 2021-01-20 DIAGNOSIS — J30.9 ALLERGIC RHINITIS, UNSPECIFIED SEASONALITY, UNSPECIFIED TRIGGER: ICD-10-CM

## 2021-01-20 PROCEDURE — 99999 PR PBB SHADOW E&M-EST. PATIENT-LVL II: ICD-10-PCS | Mod: PBBFAC,,,

## 2021-01-20 PROCEDURE — 99499 UNLISTED E&M SERVICE: CPT | Mod: S$GLB,,, | Performed by: OTOLARYNGOLOGY

## 2021-01-20 PROCEDURE — 95117 IMMUNOTHERAPY INJECTIONS: CPT | Mod: S$GLB,,, | Performed by: OTOLARYNGOLOGY

## 2021-01-20 PROCEDURE — 99499 NO LOS: ICD-10-PCS | Mod: S$GLB,,, | Performed by: OTOLARYNGOLOGY

## 2021-01-20 PROCEDURE — 99999 PR PBB SHADOW E&M-EST. PATIENT-LVL II: CPT | Mod: PBBFAC,,,

## 2021-01-20 PROCEDURE — 95117 PR IMMU2THERAPY, 2+ INJECTIONS: ICD-10-PCS | Mod: S$GLB,,, | Performed by: OTOLARYNGOLOGY

## 2021-02-10 ENCOUNTER — CLINICAL SUPPORT (OUTPATIENT)
Dept: OTOLARYNGOLOGY | Facility: CLINIC | Age: 51
End: 2021-02-10
Payer: COMMERCIAL

## 2021-02-10 DIAGNOSIS — J30.9 ALLERGIC RHINITIS, UNSPECIFIED SEASONALITY, UNSPECIFIED TRIGGER: ICD-10-CM

## 2021-02-10 PROCEDURE — 99999 PR PBB SHADOW E&M-EST. PATIENT-LVL II: CPT | Mod: PBBFAC,,,

## 2021-02-10 PROCEDURE — 95117 PR IMMU2THERAPY, 2+ INJECTIONS: ICD-10-PCS | Mod: S$GLB,,, | Performed by: OTOLARYNGOLOGY

## 2021-02-10 PROCEDURE — 99999 PR PBB SHADOW E&M-EST. PATIENT-LVL II: ICD-10-PCS | Mod: PBBFAC,,,

## 2021-02-10 PROCEDURE — 99499 UNLISTED E&M SERVICE: CPT | Mod: S$GLB,,, | Performed by: OTOLARYNGOLOGY

## 2021-02-10 PROCEDURE — 99499 NO LOS: ICD-10-PCS | Mod: S$GLB,,, | Performed by: OTOLARYNGOLOGY

## 2021-02-10 PROCEDURE — 95117 IMMUNOTHERAPY INJECTIONS: CPT | Mod: S$GLB,,, | Performed by: OTOLARYNGOLOGY

## 2021-02-18 ENCOUNTER — OFFICE VISIT (OUTPATIENT)
Dept: OPHTHALMOLOGY | Facility: CLINIC | Age: 51
End: 2021-02-18
Payer: COMMERCIAL

## 2021-02-18 DIAGNOSIS — H52.203 MYOPIA WITH ASTIGMATISM AND PRESBYOPIA, BILATERAL: Primary | ICD-10-CM

## 2021-02-18 DIAGNOSIS — H52.13 MYOPIA WITH ASTIGMATISM AND PRESBYOPIA, BILATERAL: Primary | ICD-10-CM

## 2021-02-18 DIAGNOSIS — H52.4 MYOPIA WITH ASTIGMATISM AND PRESBYOPIA, BILATERAL: Primary | ICD-10-CM

## 2021-02-18 PROCEDURE — 99999 PR PBB SHADOW E&M-EST. PATIENT-LVL II: ICD-10-PCS | Mod: PBBFAC,,, | Performed by: OPTOMETRIST

## 2021-02-18 PROCEDURE — 99999 PR PBB SHADOW E&M-EST. PATIENT-LVL II: CPT | Mod: PBBFAC,,, | Performed by: OPTOMETRIST

## 2021-02-18 PROCEDURE — 99499 NO LOS: ICD-10-PCS | Mod: S$GLB,,, | Performed by: OPTOMETRIST

## 2021-02-18 PROCEDURE — 99499 UNLISTED E&M SERVICE: CPT | Mod: S$GLB,,, | Performed by: OPTOMETRIST

## 2021-03-31 ENCOUNTER — CLINICAL SUPPORT (OUTPATIENT)
Dept: OTOLARYNGOLOGY | Facility: CLINIC | Age: 51
End: 2021-03-31
Payer: COMMERCIAL

## 2021-03-31 DIAGNOSIS — J30.9 ALLERGIC RHINITIS, UNSPECIFIED SEASONALITY, UNSPECIFIED TRIGGER: ICD-10-CM

## 2021-03-31 PROCEDURE — 95117 IMMUNOTHERAPY INJECTIONS: CPT | Mod: S$GLB,,, | Performed by: OTOLARYNGOLOGY

## 2021-03-31 PROCEDURE — 99999 PR PBB SHADOW E&M-EST. PATIENT-LVL II: ICD-10-PCS | Mod: PBBFAC,,,

## 2021-03-31 PROCEDURE — 95117 PR IMMU2THERAPY, 2+ INJECTIONS: ICD-10-PCS | Mod: S$GLB,,, | Performed by: OTOLARYNGOLOGY

## 2021-03-31 PROCEDURE — 99499 UNLISTED E&M SERVICE: CPT | Mod: S$GLB,,, | Performed by: OTOLARYNGOLOGY

## 2021-03-31 PROCEDURE — 99499 NO LOS: ICD-10-PCS | Mod: S$GLB,,, | Performed by: OTOLARYNGOLOGY

## 2021-03-31 PROCEDURE — 99999 PR PBB SHADOW E&M-EST. PATIENT-LVL II: CPT | Mod: PBBFAC,,,

## 2021-04-21 ENCOUNTER — CLINICAL SUPPORT (OUTPATIENT)
Dept: OTOLARYNGOLOGY | Facility: CLINIC | Age: 51
End: 2021-04-21
Payer: COMMERCIAL

## 2021-04-21 DIAGNOSIS — J30.9 ALLERGIC RHINITIS, UNSPECIFIED SEASONALITY, UNSPECIFIED TRIGGER: ICD-10-CM

## 2021-04-21 PROCEDURE — 95117 IMMUNOTHERAPY INJECTIONS: CPT | Mod: S$GLB,,, | Performed by: OTOLARYNGOLOGY

## 2021-04-21 PROCEDURE — 99999 PR PBB SHADOW E&M-EST. PATIENT-LVL II: CPT | Mod: PBBFAC,,,

## 2021-04-21 PROCEDURE — 99499 UNLISTED E&M SERVICE: CPT | Mod: S$GLB,,, | Performed by: OTOLARYNGOLOGY

## 2021-04-21 PROCEDURE — 99999 PR PBB SHADOW E&M-EST. PATIENT-LVL II: ICD-10-PCS | Mod: PBBFAC,,,

## 2021-04-21 PROCEDURE — 95117 PR IMMU2THERAPY, 2+ INJECTIONS: ICD-10-PCS | Mod: S$GLB,,, | Performed by: OTOLARYNGOLOGY

## 2021-04-21 PROCEDURE — 99499 NO LOS: ICD-10-PCS | Mod: S$GLB,,, | Performed by: OTOLARYNGOLOGY

## 2021-04-26 DIAGNOSIS — I10 ESSENTIAL HYPERTENSION: ICD-10-CM

## 2021-04-27 RX ORDER — CHLORTHALIDONE 25 MG/1
TABLET ORAL
Qty: 90 TABLET | Refills: 3 | Status: SHIPPED | OUTPATIENT
Start: 2021-04-27 | End: 2022-05-15 | Stop reason: SDUPTHER

## 2021-05-03 ENCOUNTER — PATIENT MESSAGE (OUTPATIENT)
Dept: ADMINISTRATIVE | Facility: OTHER | Age: 51
End: 2021-05-03

## 2021-05-05 ENCOUNTER — CLINICAL SUPPORT (OUTPATIENT)
Dept: OTOLARYNGOLOGY | Facility: CLINIC | Age: 51
End: 2021-05-05
Payer: COMMERCIAL

## 2021-05-05 DIAGNOSIS — J30.9 ALLERGIC RHINITIS, UNSPECIFIED SEASONALITY, UNSPECIFIED TRIGGER: ICD-10-CM

## 2021-05-05 PROCEDURE — 99499 NO LOS: ICD-10-PCS | Mod: S$GLB,,, | Performed by: OTOLARYNGOLOGY

## 2021-05-05 PROCEDURE — 95117 PR IMMU2THERAPY, 2+ INJECTIONS: ICD-10-PCS | Mod: S$GLB,,, | Performed by: OTOLARYNGOLOGY

## 2021-05-05 PROCEDURE — 95117 IMMUNOTHERAPY INJECTIONS: CPT | Mod: S$GLB,,, | Performed by: OTOLARYNGOLOGY

## 2021-05-05 PROCEDURE — 99999 PR PBB SHADOW E&M-EST. PATIENT-LVL II: CPT | Mod: PBBFAC,,,

## 2021-05-05 PROCEDURE — 99499 UNLISTED E&M SERVICE: CPT | Mod: S$GLB,,, | Performed by: OTOLARYNGOLOGY

## 2021-05-05 PROCEDURE — 99999 PR PBB SHADOW E&M-EST. PATIENT-LVL II: ICD-10-PCS | Mod: PBBFAC,,,

## 2021-05-26 ENCOUNTER — CLINICAL SUPPORT (OUTPATIENT)
Dept: OTOLARYNGOLOGY | Facility: CLINIC | Age: 51
End: 2021-05-26
Payer: COMMERCIAL

## 2021-05-26 DIAGNOSIS — J30.9 ALLERGIC RHINITIS, UNSPECIFIED SEASONALITY, UNSPECIFIED TRIGGER: ICD-10-CM

## 2021-05-26 DIAGNOSIS — F51.04 PSYCHOPHYSIOLOGICAL INSOMNIA: ICD-10-CM

## 2021-05-26 DIAGNOSIS — F41.9 ANXIETY: ICD-10-CM

## 2021-05-26 PROCEDURE — 99999 PR PBB SHADOW E&M-EST. PATIENT-LVL II: ICD-10-PCS | Mod: PBBFAC,,,

## 2021-05-26 PROCEDURE — 95117 IMMUNOTHERAPY INJECTIONS: CPT | Mod: S$GLB,,, | Performed by: OTOLARYNGOLOGY

## 2021-05-26 PROCEDURE — 99499 NO LOS: ICD-10-PCS | Mod: S$GLB,,, | Performed by: OTOLARYNGOLOGY

## 2021-05-26 PROCEDURE — 99499 UNLISTED E&M SERVICE: CPT | Mod: S$GLB,,, | Performed by: OTOLARYNGOLOGY

## 2021-05-26 PROCEDURE — 95117 PR IMMU2THERAPY, 2+ INJECTIONS: ICD-10-PCS | Mod: S$GLB,,, | Performed by: OTOLARYNGOLOGY

## 2021-05-26 PROCEDURE — 99999 PR PBB SHADOW E&M-EST. PATIENT-LVL II: CPT | Mod: PBBFAC,,,

## 2021-05-26 RX ORDER — TRAZODONE HYDROCHLORIDE 100 MG/1
100 TABLET ORAL NIGHTLY
Qty: 90 TABLET | Refills: 3 | Status: SHIPPED | OUTPATIENT
Start: 2021-05-26 | End: 2022-05-15 | Stop reason: SDUPTHER

## 2021-05-26 RX ORDER — FLUTICASONE PROPIONATE 50 MCG
2 SPRAY, SUSPENSION (ML) NASAL 2 TIMES DAILY
Qty: 16 G | Refills: 11 | Status: SHIPPED | OUTPATIENT
Start: 2021-05-26 | End: 2022-06-14 | Stop reason: SDUPTHER

## 2021-06-03 ENCOUNTER — LAB VISIT (OUTPATIENT)
Dept: LAB | Facility: HOSPITAL | Age: 51
End: 2021-06-03
Attending: PEDIATRICS
Payer: COMMERCIAL

## 2021-06-03 DIAGNOSIS — E78.00 PURE HYPERCHOLESTEROLEMIA: ICD-10-CM

## 2021-06-03 DIAGNOSIS — I10 ESSENTIAL HYPERTENSION: ICD-10-CM

## 2021-06-03 LAB
ALT SERPL W/O P-5'-P-CCNC: 46 U/L (ref 10–44)
ANION GAP SERPL CALC-SCNC: 10 MMOL/L (ref 8–16)
AST SERPL-CCNC: 25 U/L (ref 10–40)
BUN SERPL-MCNC: 18 MG/DL (ref 6–20)
CALCIUM SERPL-MCNC: 10.2 MG/DL (ref 8.7–10.5)
CHLORIDE SERPL-SCNC: 99 MMOL/L (ref 95–110)
CHOLEST SERPL-MCNC: 219 MG/DL (ref 120–199)
CHOLEST/HDLC SERPL: 5.5 {RATIO} (ref 2–5)
CO2 SERPL-SCNC: 31 MMOL/L (ref 23–29)
CREAT SERPL-MCNC: 0.8 MG/DL (ref 0.5–1.4)
EST. GFR  (AFRICAN AMERICAN): >60 ML/MIN/1.73 M^2
EST. GFR  (NON AFRICAN AMERICAN): >60 ML/MIN/1.73 M^2
GLUCOSE SERPL-MCNC: 94 MG/DL (ref 70–110)
HDLC SERPL-MCNC: 40 MG/DL (ref 40–75)
HDLC SERPL: 18.3 % (ref 20–50)
LDLC SERPL CALC-MCNC: 149.4 MG/DL (ref 63–159)
NONHDLC SERPL-MCNC: 179 MG/DL
POTASSIUM SERPL-SCNC: 3.8 MMOL/L (ref 3.5–5.1)
SODIUM SERPL-SCNC: 140 MMOL/L (ref 136–145)
TRIGL SERPL-MCNC: 148 MG/DL (ref 30–150)

## 2021-06-03 PROCEDURE — 84460 ALANINE AMINO (ALT) (SGPT): CPT | Performed by: PEDIATRICS

## 2021-06-03 PROCEDURE — 80048 BASIC METABOLIC PNL TOTAL CA: CPT | Performed by: PEDIATRICS

## 2021-06-03 PROCEDURE — 36415 COLL VENOUS BLD VENIPUNCTURE: CPT | Performed by: PEDIATRICS

## 2021-06-03 PROCEDURE — 84450 TRANSFERASE (AST) (SGOT): CPT | Performed by: PEDIATRICS

## 2021-06-03 PROCEDURE — 80061 LIPID PANEL: CPT | Performed by: PEDIATRICS

## 2021-06-17 ENCOUNTER — CLINICAL SUPPORT (OUTPATIENT)
Dept: OTOLARYNGOLOGY | Facility: CLINIC | Age: 51
End: 2021-06-17
Payer: COMMERCIAL

## 2021-06-17 ENCOUNTER — OFFICE VISIT (OUTPATIENT)
Dept: INTERNAL MEDICINE | Facility: CLINIC | Age: 51
End: 2021-06-17
Payer: COMMERCIAL

## 2021-06-17 VITALS
TEMPERATURE: 98 F | WEIGHT: 237.63 LBS | SYSTOLIC BLOOD PRESSURE: 118 MMHG | HEART RATE: 67 BPM | OXYGEN SATURATION: 97 % | DIASTOLIC BLOOD PRESSURE: 78 MMHG | BODY MASS INDEX: 32.19 KG/M2 | HEIGHT: 72 IN | RESPIRATION RATE: 16 BRPM

## 2021-06-17 DIAGNOSIS — J30.89 NON-SEASONAL ALLERGIC RHINITIS DUE TO OTHER ALLERGIC TRIGGER: ICD-10-CM

## 2021-06-17 DIAGNOSIS — Z12.5 PROSTATE CANCER SCREENING: ICD-10-CM

## 2021-06-17 DIAGNOSIS — Z00.00 WELL ADULT EXAM: Primary | ICD-10-CM

## 2021-06-17 DIAGNOSIS — K21.00 GASTROESOPHAGEAL REFLUX DISEASE WITH ESOPHAGITIS WITHOUT HEMORRHAGE: ICD-10-CM

## 2021-06-17 DIAGNOSIS — J30.9 ALLERGIC RHINITIS, UNSPECIFIED SEASONALITY, UNSPECIFIED TRIGGER: ICD-10-CM

## 2021-06-17 DIAGNOSIS — Z63.4 GRIEF AT LOSS OF CHILD: ICD-10-CM

## 2021-06-17 DIAGNOSIS — F43.21 GRIEF AT LOSS OF CHILD: ICD-10-CM

## 2021-06-17 DIAGNOSIS — I10 ESSENTIAL HYPERTENSION: ICD-10-CM

## 2021-06-17 DIAGNOSIS — E78.00 PURE HYPERCHOLESTEROLEMIA: ICD-10-CM

## 2021-06-17 PROCEDURE — 95117 PR IMMU2THERAPY, 2+ INJECTIONS: ICD-10-PCS | Mod: S$GLB,,, | Performed by: OTOLARYNGOLOGY

## 2021-06-17 PROCEDURE — 99396 PREV VISIT EST AGE 40-64: CPT | Mod: S$GLB,,, | Performed by: PEDIATRICS

## 2021-06-17 PROCEDURE — 3008F PR BODY MASS INDEX (BMI) DOCUMENTED: ICD-10-PCS | Mod: CPTII,S$GLB,, | Performed by: PEDIATRICS

## 2021-06-17 PROCEDURE — 99499 UNLISTED E&M SERVICE: CPT | Mod: S$GLB,,, | Performed by: OTOLARYNGOLOGY

## 2021-06-17 PROCEDURE — 99999 PR PBB SHADOW E&M-EST. PATIENT-LVL II: ICD-10-PCS | Mod: PBBFAC,,,

## 2021-06-17 PROCEDURE — 99499 NO LOS: ICD-10-PCS | Mod: S$GLB,,, | Performed by: OTOLARYNGOLOGY

## 2021-06-17 PROCEDURE — 1126F PR PAIN SEVERITY QUANTIFIED, NO PAIN PRESENT: ICD-10-PCS | Mod: S$GLB,,, | Performed by: PEDIATRICS

## 2021-06-17 PROCEDURE — 99999 PR PBB SHADOW E&M-EST. PATIENT-LVL IV: ICD-10-PCS | Mod: PBBFAC,,, | Performed by: PEDIATRICS

## 2021-06-17 PROCEDURE — 1126F AMNT PAIN NOTED NONE PRSNT: CPT | Mod: S$GLB,,, | Performed by: PEDIATRICS

## 2021-06-17 PROCEDURE — 3008F BODY MASS INDEX DOCD: CPT | Mod: CPTII,S$GLB,, | Performed by: PEDIATRICS

## 2021-06-17 PROCEDURE — 95117 IMMUNOTHERAPY INJECTIONS: CPT | Mod: S$GLB,,, | Performed by: OTOLARYNGOLOGY

## 2021-06-17 PROCEDURE — 99999 PR PBB SHADOW E&M-EST. PATIENT-LVL IV: CPT | Mod: PBBFAC,,, | Performed by: PEDIATRICS

## 2021-06-17 PROCEDURE — 99396 PR PREVENTIVE VISIT,EST,40-64: ICD-10-PCS | Mod: S$GLB,,, | Performed by: PEDIATRICS

## 2021-06-17 PROCEDURE — 99999 PR PBB SHADOW E&M-EST. PATIENT-LVL II: CPT | Mod: PBBFAC,,,

## 2021-06-17 RX ORDER — GLUCOSAM/CHONDRO/HERB 149/HYAL 750-100 MG
1 TABLET ORAL 2 TIMES DAILY
COMMUNITY

## 2021-06-17 SDOH — SOCIAL DETERMINANTS OF HEALTH (SDOH): DISSAPEARANCE AND DEATH OF FAMILY MEMBER: Z63.4

## 2021-07-09 DIAGNOSIS — I10 ESSENTIAL HYPERTENSION: ICD-10-CM

## 2021-07-09 RX ORDER — LOSARTAN POTASSIUM 50 MG/1
50 TABLET ORAL 2 TIMES DAILY
Qty: 180 TABLET | Refills: 3 | Status: SHIPPED | OUTPATIENT
Start: 2021-07-09 | End: 2022-01-27 | Stop reason: ALTCHOICE

## 2021-07-24 ENCOUNTER — OFFICE VISIT (OUTPATIENT)
Dept: URGENT CARE | Facility: CLINIC | Age: 51
End: 2021-07-24
Payer: COMMERCIAL

## 2021-07-24 VITALS
DIASTOLIC BLOOD PRESSURE: 81 MMHG | HEIGHT: 72 IN | SYSTOLIC BLOOD PRESSURE: 125 MMHG | TEMPERATURE: 99 F | HEART RATE: 89 BPM | OXYGEN SATURATION: 96 % | WEIGHT: 234.38 LBS | BODY MASS INDEX: 31.74 KG/M2

## 2021-07-24 DIAGNOSIS — I10 ESSENTIAL HYPERTENSION: ICD-10-CM

## 2021-07-24 DIAGNOSIS — U07.1 COVID-19 VIRUS DETECTED: ICD-10-CM

## 2021-07-24 DIAGNOSIS — R68.89 FLU-LIKE SYMPTOMS: ICD-10-CM

## 2021-07-24 DIAGNOSIS — Z20.822 CLOSE EXPOSURE TO COVID-19 VIRUS: Primary | ICD-10-CM

## 2021-07-24 LAB
CTP QC/QA: YES
SARS-COV-2 RDRP RESP QL NAA+PROBE: POSITIVE

## 2021-07-24 PROCEDURE — 99999 PR PBB SHADOW E&M-EST. PATIENT-LVL V: CPT | Mod: PBBFAC,,, | Performed by: NURSE PRACTITIONER

## 2021-07-24 PROCEDURE — 3074F SYST BP LT 130 MM HG: CPT | Mod: CPTII,S$GLB,, | Performed by: NURSE PRACTITIONER

## 2021-07-24 PROCEDURE — 3079F DIAST BP 80-89 MM HG: CPT | Mod: CPTII,S$GLB,, | Performed by: NURSE PRACTITIONER

## 2021-07-24 PROCEDURE — 99999 PR PBB SHADOW E&M-EST. PATIENT-LVL V: ICD-10-PCS | Mod: PBBFAC,,, | Performed by: NURSE PRACTITIONER

## 2021-07-24 PROCEDURE — U0002 COVID-19 LAB TEST NON-CDC: HCPCS | Mod: QW,S$GLB,, | Performed by: NURSE PRACTITIONER

## 2021-07-24 PROCEDURE — 3008F PR BODY MASS INDEX (BMI) DOCUMENTED: ICD-10-PCS | Mod: CPTII,S$GLB,, | Performed by: NURSE PRACTITIONER

## 2021-07-24 PROCEDURE — 1126F AMNT PAIN NOTED NONE PRSNT: CPT | Mod: CPTII,S$GLB,, | Performed by: NURSE PRACTITIONER

## 2021-07-24 PROCEDURE — 99214 OFFICE O/P EST MOD 30 MIN: CPT | Mod: S$GLB,CS,, | Performed by: NURSE PRACTITIONER

## 2021-07-24 PROCEDURE — 3074F PR MOST RECENT SYSTOLIC BLOOD PRESSURE < 130 MM HG: ICD-10-PCS | Mod: CPTII,S$GLB,, | Performed by: NURSE PRACTITIONER

## 2021-07-24 PROCEDURE — 1126F PR PAIN SEVERITY QUANTIFIED, NO PAIN PRESENT: ICD-10-PCS | Mod: CPTII,S$GLB,, | Performed by: NURSE PRACTITIONER

## 2021-07-24 PROCEDURE — U0002: ICD-10-PCS | Mod: QW,S$GLB,, | Performed by: NURSE PRACTITIONER

## 2021-07-24 PROCEDURE — 3008F BODY MASS INDEX DOCD: CPT | Mod: CPTII,S$GLB,, | Performed by: NURSE PRACTITIONER

## 2021-07-24 PROCEDURE — 99214 PR OFFICE/OUTPT VISIT, EST, LEVL IV, 30-39 MIN: ICD-10-PCS | Mod: S$GLB,CS,, | Performed by: NURSE PRACTITIONER

## 2021-07-24 PROCEDURE — 3079F PR MOST RECENT DIASTOLIC BLOOD PRESSURE 80-89 MM HG: ICD-10-PCS | Mod: CPTII,S$GLB,, | Performed by: NURSE PRACTITIONER

## 2021-07-24 RX ORDER — PROMETHAZINE HYDROCHLORIDE AND DEXTROMETHORPHAN HYDROBROMIDE 6.25; 15 MG/5ML; MG/5ML
5 SYRUP ORAL
Qty: 180 ML | Refills: 0 | Status: SHIPPED | OUTPATIENT
Start: 2021-07-24 | End: 2023-04-13

## 2021-07-28 ENCOUNTER — INFUSION (OUTPATIENT)
Dept: INFECTIOUS DISEASES | Facility: HOSPITAL | Age: 51
End: 2021-07-28
Attending: NURSE PRACTITIONER
Payer: COMMERCIAL

## 2021-07-28 VITALS
SYSTOLIC BLOOD PRESSURE: 107 MMHG | TEMPERATURE: 98 F | RESPIRATION RATE: 18 BRPM | OXYGEN SATURATION: 98 % | DIASTOLIC BLOOD PRESSURE: 59 MMHG | HEART RATE: 74 BPM

## 2021-07-28 DIAGNOSIS — Z20.822 CLOSE EXPOSURE TO COVID-19 VIRUS: ICD-10-CM

## 2021-07-28 DIAGNOSIS — U07.1 COVID-19 VIRUS DETECTED: ICD-10-CM

## 2021-07-28 DIAGNOSIS — R68.89 FLU-LIKE SYMPTOMS: ICD-10-CM

## 2021-07-28 PROCEDURE — 25000003 PHARM REV CODE 250: Performed by: INTERNAL MEDICINE

## 2021-07-28 PROCEDURE — 63600175 PHARM REV CODE 636 W HCPCS: Performed by: INTERNAL MEDICINE

## 2021-07-28 PROCEDURE — M0243 CASIRIVI AND IMDEVI INFUSION: HCPCS | Performed by: INTERNAL MEDICINE

## 2021-07-28 RX ORDER — DIPHENHYDRAMINE HYDROCHLORIDE 50 MG/ML
25 INJECTION INTRAMUSCULAR; INTRAVENOUS ONCE AS NEEDED
Status: DISCONTINUED | OUTPATIENT
Start: 2021-07-28 | End: 2022-03-07

## 2021-07-28 RX ORDER — SODIUM CHLORIDE 0.9 % (FLUSH) 0.9 %
10 SYRINGE (ML) INJECTION
Status: DISCONTINUED | OUTPATIENT
Start: 2021-07-28 | End: 2022-03-07

## 2021-07-28 RX ORDER — ACETAMINOPHEN 325 MG/1
650 TABLET ORAL ONCE AS NEEDED
Status: DISCONTINUED | OUTPATIENT
Start: 2021-07-28 | End: 2022-03-07

## 2021-07-28 RX ORDER — EPINEPHRINE 0.3 MG/.3ML
0.3 INJECTION SUBCUTANEOUS
Status: DISCONTINUED | OUTPATIENT
Start: 2021-07-28 | End: 2022-03-07

## 2021-07-28 RX ORDER — ONDANSETRON 4 MG/1
4 TABLET, ORALLY DISINTEGRATING ORAL ONCE AS NEEDED
Status: DISCONTINUED | OUTPATIENT
Start: 2021-07-28 | End: 2023-10-05

## 2021-07-28 RX ORDER — ALBUTEROL SULFATE 90 UG/1
2 AEROSOL, METERED RESPIRATORY (INHALATION)
Status: DISCONTINUED | OUTPATIENT
Start: 2021-07-28 | End: 2022-03-07

## 2021-07-28 RX ADMIN — CASIRIVIMAB AND IMDEVIMAB 600 MG: 600; 600 INJECTION, SOLUTION, CONCENTRATE INTRAVENOUS at 10:07

## 2021-07-29 DIAGNOSIS — J32.4 CHRONIC PANSINUSITIS: ICD-10-CM

## 2021-07-29 DIAGNOSIS — J33.9 NASAL POLYPOSIS: ICD-10-CM

## 2021-07-29 RX ORDER — MONTELUKAST SODIUM 10 MG/1
10 TABLET ORAL NIGHTLY
Qty: 30 TABLET | Refills: 12 | Status: SHIPPED | OUTPATIENT
Start: 2021-07-29 | End: 2022-09-09 | Stop reason: SDUPTHER

## 2021-07-30 RX ORDER — PANTOPRAZOLE SODIUM 40 MG/1
40 TABLET, DELAYED RELEASE ORAL DAILY
Qty: 30 TABLET | Refills: 6 | Status: SHIPPED | OUTPATIENT
Start: 2021-07-30 | End: 2022-03-15 | Stop reason: SDUPTHER

## 2021-10-15 ENCOUNTER — OFFICE VISIT (OUTPATIENT)
Dept: DERMATOLOGY | Facility: CLINIC | Age: 51
End: 2021-10-15
Payer: COMMERCIAL

## 2021-10-15 DIAGNOSIS — D48.5 NEOPLASM OF UNCERTAIN BEHAVIOR OF SKIN: Primary | ICD-10-CM

## 2021-10-15 PROCEDURE — 88305 TISSUE EXAM BY PATHOLOGIST: CPT | Performed by: PATHOLOGY

## 2021-10-15 PROCEDURE — 88305 TISSUE EXAM BY PATHOLOGIST: ICD-10-PCS | Mod: 26,,, | Performed by: PATHOLOGY

## 2021-10-15 PROCEDURE — 1159F PR MEDICATION LIST DOCUMENTED IN MEDICAL RECORD: ICD-10-PCS | Mod: CPTII,S$GLB,, | Performed by: STUDENT IN AN ORGANIZED HEALTH CARE EDUCATION/TRAINING PROGRAM

## 2021-10-15 PROCEDURE — 1160F PR REVIEW ALL MEDS BY PRESCRIBER/CLIN PHARMACIST DOCUMENTED: ICD-10-PCS | Mod: CPTII,S$GLB,, | Performed by: STUDENT IN AN ORGANIZED HEALTH CARE EDUCATION/TRAINING PROGRAM

## 2021-10-15 PROCEDURE — 4010F ACE/ARB THERAPY RXD/TAKEN: CPT | Mod: CPTII,S$GLB,, | Performed by: STUDENT IN AN ORGANIZED HEALTH CARE EDUCATION/TRAINING PROGRAM

## 2021-10-15 PROCEDURE — 4010F PR ACE/ARB THEARPY RXD/TAKEN: ICD-10-PCS | Mod: CPTII,S$GLB,, | Performed by: STUDENT IN AN ORGANIZED HEALTH CARE EDUCATION/TRAINING PROGRAM

## 2021-10-15 PROCEDURE — 88305 TISSUE EXAM BY PATHOLOGIST: CPT | Mod: 26,,, | Performed by: PATHOLOGY

## 2021-10-15 PROCEDURE — 99999 PR PBB SHADOW E&M-EST. PATIENT-LVL III: ICD-10-PCS | Mod: PBBFAC,,, | Performed by: STUDENT IN AN ORGANIZED HEALTH CARE EDUCATION/TRAINING PROGRAM

## 2021-10-15 PROCEDURE — 88341 PR IHC OR ICC EACH ADD'L SINGLE ANTIBODY  STAINPR: ICD-10-PCS | Mod: 26,,, | Performed by: PATHOLOGY

## 2021-10-15 PROCEDURE — 99213 OFFICE O/P EST LOW 20 MIN: CPT | Mod: 25,S$GLB,, | Performed by: STUDENT IN AN ORGANIZED HEALTH CARE EDUCATION/TRAINING PROGRAM

## 2021-10-15 PROCEDURE — 11102 TANGNTL BX SKIN SINGLE LES: CPT | Mod: S$GLB,,, | Performed by: STUDENT IN AN ORGANIZED HEALTH CARE EDUCATION/TRAINING PROGRAM

## 2021-10-15 PROCEDURE — 99213 PR OFFICE/OUTPT VISIT, EST, LEVL III, 20-29 MIN: ICD-10-PCS | Mod: 25,S$GLB,, | Performed by: STUDENT IN AN ORGANIZED HEALTH CARE EDUCATION/TRAINING PROGRAM

## 2021-10-15 PROCEDURE — 88342 IMHCHEM/IMCYTCHM 1ST ANTB: CPT | Mod: 26,,, | Performed by: PATHOLOGY

## 2021-10-15 PROCEDURE — 88341 IMHCHEM/IMCYTCHM EA ADD ANTB: CPT | Mod: 26,,, | Performed by: PATHOLOGY

## 2021-10-15 PROCEDURE — 88341 IMHCHEM/IMCYTCHM EA ADD ANTB: CPT | Performed by: PATHOLOGY

## 2021-10-15 PROCEDURE — 88342 IMHCHEM/IMCYTCHM 1ST ANTB: CPT | Mod: 91 | Performed by: PATHOLOGY

## 2021-10-15 PROCEDURE — 88342 CHG IMMUNOCYTOCHEMISTRY: ICD-10-PCS | Mod: 26,,, | Performed by: PATHOLOGY

## 2021-10-15 PROCEDURE — 11102 PR TANGENTIAL BIOPSY, SKIN, SINGLE LESION: ICD-10-PCS | Mod: S$GLB,,, | Performed by: STUDENT IN AN ORGANIZED HEALTH CARE EDUCATION/TRAINING PROGRAM

## 2021-10-15 PROCEDURE — 88271 CYTOGENETICS DNA PROBE: CPT | Performed by: PATHOLOGY

## 2021-10-15 PROCEDURE — 88341 IMHCHEM/IMCYTCHM EA ADD ANTB: CPT | Mod: 59 | Performed by: PATHOLOGY

## 2021-10-15 PROCEDURE — 1159F MED LIST DOCD IN RCRD: CPT | Mod: CPTII,S$GLB,, | Performed by: STUDENT IN AN ORGANIZED HEALTH CARE EDUCATION/TRAINING PROGRAM

## 2021-10-15 PROCEDURE — 88342 IMHCHEM/IMCYTCHM 1ST ANTB: CPT | Performed by: PATHOLOGY

## 2021-10-15 PROCEDURE — 88275 CYTOGENETICS 100-300: CPT | Performed by: PATHOLOGY

## 2021-10-15 PROCEDURE — 1160F RVW MEDS BY RX/DR IN RCRD: CPT | Mod: CPTII,S$GLB,, | Performed by: STUDENT IN AN ORGANIZED HEALTH CARE EDUCATION/TRAINING PROGRAM

## 2021-10-15 PROCEDURE — 99999 PR PBB SHADOW E&M-EST. PATIENT-LVL III: CPT | Mod: PBBFAC,,, | Performed by: STUDENT IN AN ORGANIZED HEALTH CARE EDUCATION/TRAINING PROGRAM

## 2021-10-29 ENCOUNTER — IMMUNIZATION (OUTPATIENT)
Dept: PHARMACY | Facility: CLINIC | Age: 51
End: 2021-10-29
Payer: COMMERCIAL

## 2021-10-29 DIAGNOSIS — Z23 NEED FOR VACCINATION: Primary | ICD-10-CM

## 2021-11-07 ENCOUNTER — PATIENT MESSAGE (OUTPATIENT)
Dept: DERMATOLOGY | Facility: CLINIC | Age: 51
End: 2021-11-07
Payer: COMMERCIAL

## 2021-11-08 ENCOUNTER — TELEPHONE (OUTPATIENT)
Dept: DERMATOLOGY | Facility: CLINIC | Age: 51
End: 2021-11-08
Payer: COMMERCIAL

## 2021-11-26 ENCOUNTER — IMMUNIZATION (OUTPATIENT)
Dept: PHARMACY | Facility: CLINIC | Age: 51
End: 2021-11-26
Payer: COMMERCIAL

## 2021-11-26 DIAGNOSIS — Z23 NEED FOR VACCINATION: Primary | ICD-10-CM

## 2021-12-01 LAB
FINAL PATHOLOGIC DIAGNOSIS: NORMAL
GROSS: NORMAL
Lab: NORMAL

## 2021-12-02 ENCOUNTER — PATIENT MESSAGE (OUTPATIENT)
Dept: DERMATOLOGY | Facility: CLINIC | Age: 51
End: 2021-12-02
Payer: COMMERCIAL

## 2021-12-07 ENCOUNTER — PROCEDURE VISIT (OUTPATIENT)
Dept: DERMATOLOGY | Facility: CLINIC | Age: 51
End: 2021-12-07
Payer: COMMERCIAL

## 2021-12-07 DIAGNOSIS — D23.9 HIDRADENOMA: Primary | ICD-10-CM

## 2021-12-07 PROCEDURE — 88305 TISSUE EXAM BY PATHOLOGIST: ICD-10-PCS | Mod: 26,,, | Performed by: PATHOLOGY

## 2021-12-07 PROCEDURE — 11442 EXC FACE-MM B9+MARG 1.1-2 CM: CPT | Mod: S$GLB,,, | Performed by: STUDENT IN AN ORGANIZED HEALTH CARE EDUCATION/TRAINING PROGRAM

## 2021-12-07 PROCEDURE — 99499 NO LOS: ICD-10-PCS | Mod: S$GLB,,, | Performed by: STUDENT IN AN ORGANIZED HEALTH CARE EDUCATION/TRAINING PROGRAM

## 2021-12-07 PROCEDURE — 12051 PR INTERMED WOUND REPAIR FACE/EAR/EYELID/NOSE/LIP/MUC MEBR, 2.5CM OR LESS: ICD-10-PCS | Mod: 51,S$GLB,, | Performed by: STUDENT IN AN ORGANIZED HEALTH CARE EDUCATION/TRAINING PROGRAM

## 2021-12-07 PROCEDURE — 99499 UNLISTED E&M SERVICE: CPT | Mod: S$GLB,,, | Performed by: STUDENT IN AN ORGANIZED HEALTH CARE EDUCATION/TRAINING PROGRAM

## 2021-12-07 PROCEDURE — 12051 INTMD RPR FACE/MM 2.5 CM/<: CPT | Mod: 51,S$GLB,, | Performed by: STUDENT IN AN ORGANIZED HEALTH CARE EDUCATION/TRAINING PROGRAM

## 2021-12-07 PROCEDURE — 88305 TISSUE EXAM BY PATHOLOGIST: CPT | Mod: 26,,, | Performed by: PATHOLOGY

## 2021-12-07 PROCEDURE — 88305 TISSUE EXAM BY PATHOLOGIST: CPT | Performed by: PATHOLOGY

## 2021-12-07 PROCEDURE — 11442 PR EXC SKIN BENIG 1.1-2 CM FACE,FACIAL: ICD-10-PCS | Mod: S$GLB,,, | Performed by: STUDENT IN AN ORGANIZED HEALTH CARE EDUCATION/TRAINING PROGRAM

## 2021-12-10 LAB
FINAL PATHOLOGIC DIAGNOSIS: NORMAL
GROSS: NORMAL
Lab: NORMAL
MICROSCOPIC EXAM: NORMAL

## 2021-12-14 ENCOUNTER — CLINICAL SUPPORT (OUTPATIENT)
Dept: DERMATOLOGY | Facility: CLINIC | Age: 51
End: 2021-12-14
Payer: COMMERCIAL

## 2021-12-14 DIAGNOSIS — Z48.02 VISIT FOR SUTURE REMOVAL: Primary | ICD-10-CM

## 2021-12-14 PROCEDURE — 99999 PR PBB SHADOW E&M-EST. PATIENT-LVL III: ICD-10-PCS | Mod: PBBFAC,,,

## 2021-12-14 PROCEDURE — 99999 PR PBB SHADOW E&M-EST. PATIENT-LVL III: CPT | Mod: PBBFAC,,,

## 2022-02-06 DIAGNOSIS — F43.21 GRIEF AT LOSS OF CHILD: ICD-10-CM

## 2022-02-06 DIAGNOSIS — Z63.4 GRIEF AT LOSS OF CHILD: ICD-10-CM

## 2022-02-06 SDOH — SOCIAL DETERMINANTS OF HEALTH (SDOH): DISSAPEARANCE AND DEATH OF FAMILY MEMBER: Z63.4

## 2022-02-07 RX ORDER — SERTRALINE HYDROCHLORIDE 50 MG/1
50 TABLET, FILM COATED ORAL DAILY
Qty: 90 TABLET | Refills: 3 | Status: SHIPPED | OUTPATIENT
Start: 2022-02-07 | End: 2022-03-07

## 2022-02-07 NOTE — TELEPHONE ENCOUNTER
No new care gaps identified.  Powered by [email or web address]. Reference number: [id number].   2/06/2022 8:24:46 PM CST

## 2022-02-28 ENCOUNTER — OFFICE VISIT (OUTPATIENT)
Dept: INTERNAL MEDICINE | Facility: CLINIC | Age: 52
End: 2022-02-28
Payer: COMMERCIAL

## 2022-02-28 ENCOUNTER — LAB VISIT (OUTPATIENT)
Dept: LAB | Facility: HOSPITAL | Age: 52
End: 2022-02-28
Attending: PEDIATRICS
Payer: COMMERCIAL

## 2022-02-28 VITALS
BODY MASS INDEX: 32.14 KG/M2 | DIASTOLIC BLOOD PRESSURE: 84 MMHG | SYSTOLIC BLOOD PRESSURE: 110 MMHG | OXYGEN SATURATION: 98 % | HEART RATE: 94 BPM | WEIGHT: 237 LBS | RESPIRATION RATE: 18 BRPM

## 2022-02-28 DIAGNOSIS — Z12.5 PROSTATE CANCER SCREENING: ICD-10-CM

## 2022-02-28 DIAGNOSIS — T14.8XXA PULLED MUSCLE: Primary | ICD-10-CM

## 2022-02-28 DIAGNOSIS — E78.00 PURE HYPERCHOLESTEROLEMIA: ICD-10-CM

## 2022-02-28 DIAGNOSIS — I10 ESSENTIAL HYPERTENSION: ICD-10-CM

## 2022-02-28 LAB
ALBUMIN SERPL BCP-MCNC: 4.3 G/DL (ref 3.5–5.2)
ALP SERPL-CCNC: 77 U/L (ref 55–135)
ALT SERPL W/O P-5'-P-CCNC: 50 U/L (ref 10–44)
ANION GAP SERPL CALC-SCNC: 10 MMOL/L (ref 8–16)
AST SERPL-CCNC: 27 U/L (ref 10–40)
BILIRUB SERPL-MCNC: 1.2 MG/DL (ref 0.1–1)
BUN SERPL-MCNC: 18 MG/DL (ref 6–20)
CALCIUM SERPL-MCNC: 9.9 MG/DL (ref 8.7–10.5)
CHLORIDE SERPL-SCNC: 99 MMOL/L (ref 95–110)
CHOLEST SERPL-MCNC: 235 MG/DL (ref 120–199)
CHOLEST/HDLC SERPL: 5.1 {RATIO} (ref 2–5)
CO2 SERPL-SCNC: 29 MMOL/L (ref 23–29)
COMPLEXED PSA SERPL-MCNC: 0.82 NG/ML (ref 0–4)
CREAT SERPL-MCNC: 0.8 MG/DL (ref 0.5–1.4)
EST. GFR  (AFRICAN AMERICAN): >60 ML/MIN/1.73 M^2
EST. GFR  (NON AFRICAN AMERICAN): >60 ML/MIN/1.73 M^2
GLUCOSE SERPL-MCNC: 96 MG/DL (ref 70–110)
HDLC SERPL-MCNC: 46 MG/DL (ref 40–75)
HDLC SERPL: 19.6 % (ref 20–50)
LDLC SERPL CALC-MCNC: 158.6 MG/DL (ref 63–159)
NONHDLC SERPL-MCNC: 189 MG/DL
POTASSIUM SERPL-SCNC: 3.8 MMOL/L (ref 3.5–5.1)
PROT SERPL-MCNC: 7.6 G/DL (ref 6–8.4)
SODIUM SERPL-SCNC: 138 MMOL/L (ref 136–145)
TRIGL SERPL-MCNC: 152 MG/DL (ref 30–150)

## 2022-02-28 PROCEDURE — 84153 ASSAY OF PSA TOTAL: CPT | Performed by: PEDIATRICS

## 2022-02-28 PROCEDURE — 3074F SYST BP LT 130 MM HG: CPT | Mod: CPTII,S$GLB,, | Performed by: NURSE PRACTITIONER

## 2022-02-28 PROCEDURE — 1159F MED LIST DOCD IN RCRD: CPT | Mod: CPTII,S$GLB,, | Performed by: NURSE PRACTITIONER

## 2022-02-28 PROCEDURE — 4010F ACE/ARB THERAPY RXD/TAKEN: CPT | Mod: CPTII,S$GLB,, | Performed by: NURSE PRACTITIONER

## 2022-02-28 PROCEDURE — 3074F PR MOST RECENT SYSTOLIC BLOOD PRESSURE < 130 MM HG: ICD-10-PCS | Mod: CPTII,S$GLB,, | Performed by: NURSE PRACTITIONER

## 2022-02-28 PROCEDURE — 1159F PR MEDICATION LIST DOCUMENTED IN MEDICAL RECORD: ICD-10-PCS | Mod: CPTII,S$GLB,, | Performed by: NURSE PRACTITIONER

## 2022-02-28 PROCEDURE — 99999 PR PBB SHADOW E&M-EST. PATIENT-LVL V: CPT | Mod: PBBFAC,,, | Performed by: NURSE PRACTITIONER

## 2022-02-28 PROCEDURE — 3008F BODY MASS INDEX DOCD: CPT | Mod: CPTII,S$GLB,, | Performed by: NURSE PRACTITIONER

## 2022-02-28 PROCEDURE — 80061 LIPID PANEL: CPT | Performed by: PEDIATRICS

## 2022-02-28 PROCEDURE — 3079F DIAST BP 80-89 MM HG: CPT | Mod: CPTII,S$GLB,, | Performed by: NURSE PRACTITIONER

## 2022-02-28 PROCEDURE — 36415 COLL VENOUS BLD VENIPUNCTURE: CPT | Performed by: PEDIATRICS

## 2022-02-28 PROCEDURE — 99213 OFFICE O/P EST LOW 20 MIN: CPT | Mod: S$GLB,,, | Performed by: NURSE PRACTITIONER

## 2022-02-28 PROCEDURE — 3079F PR MOST RECENT DIASTOLIC BLOOD PRESSURE 80-89 MM HG: ICD-10-PCS | Mod: CPTII,S$GLB,, | Performed by: NURSE PRACTITIONER

## 2022-02-28 PROCEDURE — 4010F PR ACE/ARB THEARPY RXD/TAKEN: ICD-10-PCS | Mod: CPTII,S$GLB,, | Performed by: NURSE PRACTITIONER

## 2022-02-28 PROCEDURE — 3008F PR BODY MASS INDEX (BMI) DOCUMENTED: ICD-10-PCS | Mod: CPTII,S$GLB,, | Performed by: NURSE PRACTITIONER

## 2022-02-28 PROCEDURE — 80053 COMPREHEN METABOLIC PANEL: CPT | Performed by: PEDIATRICS

## 2022-02-28 PROCEDURE — 99999 PR PBB SHADOW E&M-EST. PATIENT-LVL V: ICD-10-PCS | Mod: PBBFAC,,, | Performed by: NURSE PRACTITIONER

## 2022-02-28 PROCEDURE — 99213 PR OFFICE/OUTPT VISIT, EST, LEVL III, 20-29 MIN: ICD-10-PCS | Mod: S$GLB,,, | Performed by: NURSE PRACTITIONER

## 2022-02-28 RX ORDER — CYCLOBENZAPRINE HCL 5 MG
5 TABLET ORAL NIGHTLY PRN
Qty: 10 TABLET | Refills: 0 | Status: SHIPPED | OUTPATIENT
Start: 2022-02-28 | End: 2022-03-10

## 2022-02-28 RX ORDER — PREDNISONE 20 MG/1
TABLET ORAL
Qty: 10 TABLET | Refills: 0 | Status: SHIPPED | OUTPATIENT
Start: 2022-02-28 | End: 2022-03-07

## 2022-02-28 NOTE — PROGRESS NOTES
Subjective:       Patient ID: Irineo Betts is a 52 y.o. male.    Chief Complaint: pain in left side    HPI    Pt reports L upper hip/lower back painful with movement post bending to dry himself of after a shower- he states he has had pain in this area before (rare) but usually resolves after OTC meds. Only painful with certain movements.  Reports using advil/icy hot patch helped minimally    Past Medical History:   Diagnosis Date    Allergy     Benign heart murmur     Corneal ulcer 01/08/2019    right eye    Hypertension     Nephrolithiasis      Past Surgical History:   Procedure Laterality Date    ESOPHAGOGASTRODUODENOSCOPY N/A 11/15/2019    Procedure: EGD (ESOPHAGOGASTRODUODENOSCOPY);  Surgeon: Edilma Fleming MD;  Location: [facility];  Service: Endoscopy;  Laterality: N/A;    SINUS SURGERY      UNDESCENDED TESTICLE EXPLORATION       Social History     Socioeconomic History    Marital status:    Tobacco Use    Smoking status: Never Smoker    Smokeless tobacco: Never Used   Substance and Sexual Activity    Alcohol use: Yes     Comment: occassinally      Drug use: No   Social History Narrative    No smokers in household, 1 dog and 1 cat.     Review of patient's allergies indicates:  No Known Allergies  Current Outpatient Medications   Medication Sig    Allergy Mix Therapeutic SCIT refill    aspirin (ECOTRIN) 81 MG EC tablet Take 81 mg by mouth once daily.    chlorthalidone (HYGROTEN) 25 MG Tab Take 1 tablet by mouth daily for blood pressure    fluoride, sodium, (PREVIDENT 5000 PLUS) 1.1 % Crea Apply to teeth at bed time.    fluticasone propionate (FLONASE) 50 mcg/actuation nasal spray Instill 2 sprays (100 mcg total) by Each nostril 2 (two) times daily. (Patient taking differently: 2 sprays by Each Nostril route as needed.)    montelukast (SINGULAIR) 10 mg tablet Take 1 tablet (10 mg total) by mouth every evening.    omega 3-dha-epa-fish oil 1,000 mg (120 mg-180 mg) Cap Take  1 capsule by mouth 2 (two) times a day.    pantoprazole (PROTONIX) 40 MG tablet Take 1 tablet (40 mg total) by mouth once daily.    promethazine-dextromethorphan (PROMETHAZINE-DM) 6.25-15 mg/5 mL Syrp Take 5 mLs by mouth every 6 to 8 hours as needed.    sertraline (ZOLOFT) 50 MG tablet Take 1 tablet (50 mg total) by mouth once daily.    telmisartan (MICARDIS) 80 MG Tab Take 1 tablet (80 mg total) by mouth every evening. (replaces losartan for BP)    traZODone (DESYREL) 100 MG tablet Take 1 tablet (100 mg total) by mouth every evening.    cyclobenzaprine (FLEXERIL) 5 MG tablet Take 1 tablet (5 mg total) by mouth nightly as needed for Muscle spasms.    predniSONE (DELTASONE) 20 MG tablet Take 3 tablets by mouth today, then take 2 tablets daily for 2 days, 1 tablet dailyl for 2 days, then 1/2 tablet daily for 2 days     Current Facility-Administered Medications   Medication    acetaminophen tablet 650 mg    albuterol inhaler 2 puff    diphenhydrAMINE injection 25 mg    EPINEPHrine (EPIPEN) 0.3 mg/0.3 mL pen injection 0.3 mg    ondansetron disintegrating tablet 4 mg    sodium chloride 0.9% 500 mL flush bag    sodium chloride 0.9% flush 10 mL           Review of Systems   Constitutional: Negative for activity change, appetite change, chills, diaphoresis, fatigue, fever and unexpected weight change.   HENT: Negative for congestion, ear pain, postnasal drip, rhinorrhea, sinus pressure, sinus pain, sneezing, sore throat, tinnitus, trouble swallowing and voice change.    Eyes: Negative for photophobia, pain and visual disturbance.   Respiratory: Negative for cough, chest tightness, shortness of breath and wheezing.    Cardiovascular: Negative for chest pain, palpitations and leg swelling.   Gastrointestinal: Negative for abdominal distention, abdominal pain, constipation, diarrhea, nausea and vomiting.   Genitourinary: Negative for decreased urine volume, difficulty urinating, dysuria, flank pain, frequency,  hematuria and urgency.   Musculoskeletal: Positive for arthralgias and myalgias. Negative for back pain, joint swelling, neck pain and neck stiffness.   Allergic/Immunologic: Negative for immunocompromised state.   Neurological: Negative for dizziness, tremors, seizures, syncope, facial asymmetry, speech difficulty, weakness, light-headedness, numbness and headaches.   Hematological: Negative for adenopathy. Does not bruise/bleed easily.   Psychiatric/Behavioral: Negative for confusion and sleep disturbance.       Objective:      Physical Exam  HENT:      Head: Normocephalic and atraumatic.      Right Ear: Tympanic membrane normal.      Left Ear: Tympanic membrane normal.   Eyes:      Conjunctiva/sclera: Conjunctivae normal.   Cardiovascular:      Rate and Rhythm: Normal rate and regular rhythm.      Heart sounds: Normal heart sounds.   Pulmonary:      Effort: Pulmonary effort is normal.      Breath sounds: Normal breath sounds.   Abdominal:      General: Bowel sounds are normal.      Palpations: Abdomen is soft.   Musculoskeletal:         General: Normal range of motion.      Cervical back: Normal range of motion and neck supple.      Left hip: Tenderness present. No deformity, lacerations, bony tenderness or crepitus. Normal range of motion. Normal strength.   Skin:     General: Skin is warm and dry.   Neurological:      Mental Status: He is alert and oriented to person, place, and time.         Assessment:     Vitals:    02/28/22 0833   BP: 110/84   Pulse: 94   Resp: 18         1. Pulled muscle        Plan:   Pulled muscle    Other orders  -     cyclobenzaprine (FLEXERIL) 5 MG tablet; Take 1 tablet (5 mg total) by mouth nightly as needed for Muscle spasms.  Dispense: 10 tablet; Refill: 0  -     predniSONE (DELTASONE) 20 MG tablet; Take 3 tablets by mouth today, then take 2 tablets daily for 2 days, 1 tablet dailyl for 2 days, then 1/2 tablet daily for 2 days  Dispense: 10 tablet; Refill: 0      As above  Warm  heat/ice  Meds/SE discussed  Return PRN

## 2022-03-07 ENCOUNTER — OFFICE VISIT (OUTPATIENT)
Dept: INTERNAL MEDICINE | Facility: CLINIC | Age: 52
End: 2022-03-07
Payer: COMMERCIAL

## 2022-03-07 VITALS
HEART RATE: 88 BPM | WEIGHT: 240.06 LBS | HEIGHT: 72 IN | DIASTOLIC BLOOD PRESSURE: 86 MMHG | SYSTOLIC BLOOD PRESSURE: 124 MMHG | OXYGEN SATURATION: 98 % | RESPIRATION RATE: 18 BRPM | BODY MASS INDEX: 32.52 KG/M2

## 2022-03-07 DIAGNOSIS — J30.89 NON-SEASONAL ALLERGIC RHINITIS DUE TO OTHER ALLERGIC TRIGGER: ICD-10-CM

## 2022-03-07 DIAGNOSIS — Z00.00 WELL ADULT EXAM: Primary | ICD-10-CM

## 2022-03-07 DIAGNOSIS — E78.00 PURE HYPERCHOLESTEROLEMIA: ICD-10-CM

## 2022-03-07 DIAGNOSIS — I10 ESSENTIAL HYPERTENSION: ICD-10-CM

## 2022-03-07 DIAGNOSIS — F43.21 GRIEF AT LOSS OF CHILD: ICD-10-CM

## 2022-03-07 DIAGNOSIS — Z63.4 GRIEF AT LOSS OF CHILD: ICD-10-CM

## 2022-03-07 DIAGNOSIS — K21.00 GASTROESOPHAGEAL REFLUX DISEASE WITH ESOPHAGITIS WITHOUT HEMORRHAGE: ICD-10-CM

## 2022-03-07 DIAGNOSIS — Z12.11 COLON CANCER SCREENING: ICD-10-CM

## 2022-03-07 DIAGNOSIS — F41.9 ANXIETY: ICD-10-CM

## 2022-03-07 PROBLEM — T18.128A FOOD IMPACTION OF ESOPHAGUS: Status: RESOLVED | Noted: 2019-11-14 | Resolved: 2022-03-07
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
## 2022-03-07 PROBLEM — R13.10 DYSPHAGIA: Status: RESOLVED | Noted: 2019-11-14 | Resolved: 2022-03-07

## 2022-03-07 PROCEDURE — 99396 PR PREVENTIVE VISIT,EST,40-64: ICD-10-PCS | Mod: S$GLB,,, | Performed by: PEDIATRICS

## 2022-03-07 PROCEDURE — 4010F ACE/ARB THERAPY RXD/TAKEN: CPT | Mod: CPTII,S$GLB,, | Performed by: PEDIATRICS

## 2022-03-07 PROCEDURE — 4010F PR ACE/ARB THEARPY RXD/TAKEN: ICD-10-PCS | Mod: CPTII,S$GLB,, | Performed by: PEDIATRICS

## 2022-03-07 PROCEDURE — 99396 PREV VISIT EST AGE 40-64: CPT | Mod: S$GLB,,, | Performed by: PEDIATRICS

## 2022-03-07 PROCEDURE — 1159F MED LIST DOCD IN RCRD: CPT | Mod: CPTII,S$GLB,, | Performed by: PEDIATRICS

## 2022-03-07 PROCEDURE — 1160F RVW MEDS BY RX/DR IN RCRD: CPT | Mod: CPTII,S$GLB,, | Performed by: PEDIATRICS

## 2022-03-07 PROCEDURE — 3008F BODY MASS INDEX DOCD: CPT | Mod: CPTII,S$GLB,, | Performed by: PEDIATRICS

## 2022-03-07 PROCEDURE — 3074F SYST BP LT 130 MM HG: CPT | Mod: CPTII,S$GLB,, | Performed by: PEDIATRICS

## 2022-03-07 PROCEDURE — 1159F PR MEDICATION LIST DOCUMENTED IN MEDICAL RECORD: ICD-10-PCS | Mod: CPTII,S$GLB,, | Performed by: PEDIATRICS

## 2022-03-07 PROCEDURE — 99999 PR PBB SHADOW E&M-EST. PATIENT-LVL IV: CPT | Mod: PBBFAC,,, | Performed by: PEDIATRICS

## 2022-03-07 PROCEDURE — 3079F PR MOST RECENT DIASTOLIC BLOOD PRESSURE 80-89 MM HG: ICD-10-PCS | Mod: CPTII,S$GLB,, | Performed by: PEDIATRICS

## 2022-03-07 PROCEDURE — 99999 PR PBB SHADOW E&M-EST. PATIENT-LVL IV: ICD-10-PCS | Mod: PBBFAC,,, | Performed by: PEDIATRICS

## 2022-03-07 PROCEDURE — 1160F PR REVIEW ALL MEDS BY PRESCRIBER/CLIN PHARMACIST DOCUMENTED: ICD-10-PCS | Mod: CPTII,S$GLB,, | Performed by: PEDIATRICS

## 2022-03-07 PROCEDURE — 3008F PR BODY MASS INDEX (BMI) DOCUMENTED: ICD-10-PCS | Mod: CPTII,S$GLB,, | Performed by: PEDIATRICS

## 2022-03-07 PROCEDURE — 3074F PR MOST RECENT SYSTOLIC BLOOD PRESSURE < 130 MM HG: ICD-10-PCS | Mod: CPTII,S$GLB,, | Performed by: PEDIATRICS

## 2022-03-07 PROCEDURE — 3079F DIAST BP 80-89 MM HG: CPT | Mod: CPTII,S$GLB,, | Performed by: PEDIATRICS

## 2022-03-07 RX ORDER — SERTRALINE HYDROCHLORIDE 100 MG/1
100 TABLET, FILM COATED ORAL DAILY
Qty: 90 TABLET | Refills: 3 | Status: SHIPPED | OUTPATIENT
Start: 2022-03-07 | End: 2023-03-12 | Stop reason: SDUPTHER

## 2022-03-07 SDOH — SOCIAL DETERMINANTS OF HEALTH (SDOH): DISSAPEARANCE AND DEATH OF FAMILY MEMBER: Z63.4

## 2022-03-07 NOTE — PROGRESS NOTES
Subjective:       Patient ID: Irineo Betts is a 52 y.o. male.    Chief Complaint: Follow-up    Irineo Betts is a 52 y.o. male who presents to clinic for a follow up. Wife is present. She has concerns for his memory saying she has to tell him multiple times a week to do something. No problems with planning.      1) HTN: on ARB and chlorthalidone, doing well, In dig htn clinic, B/P high normal. Weight is up.  2) Anxiety/grief: on trazodone and sertraline and sleeping well. No HI/SI. Stopped counseling, anxieties worsening due to work and home stressors causing him to lose focus as noted above  3) Lipids: not working on D&E, ASCVD has increased to 6.1 %, never started statin  4) GERD with esophagitis: Confirmed by EGD. On PPI with no symptoms  5) Allergies: shot every 2 weeks helps, sees Dr. Kurtz  6) swallowing difficulty: resolved with Protonix     LABS REVIEWED AND DISCUSSED WITH PATIENT: PSA, lipid panel, and CMP    Review of Systems   Constitutional: Negative for activity change, appetite change, chills, diaphoresis, fatigue, fever and unexpected weight change.   HENT: Negative for nasal congestion, ear pain, mouth sores, nosebleeds, postnasal drip, rhinorrhea, sneezing and sore throat.    Eyes: Negative for photophobia, pain, discharge, redness and visual disturbance.   Respiratory: Negative for cough, chest tightness, shortness of breath, wheezing and stridor.    Cardiovascular: Negative for chest pain, palpitations and leg swelling.   Gastrointestinal: Negative for abdominal distention, blood in stool, constipation, diarrhea, nausea and vomiting.   Genitourinary: Negative for decreased urine volume, difficulty urinating, dysuria, flank pain, frequency, genital sores, hematuria and urgency.   Musculoskeletal: Negative for arthralgias, back pain, joint swelling, neck pain and neck stiffness.   Integumentary:  Negative for color change, pallor, rash and wound.   Neurological: Positive for memory  loss. Negative for dizziness, syncope, speech difficulty, weakness, light-headedness and headaches.   Hematological: Negative for adenopathy. Does not bruise/bleed easily.   Psychiatric/Behavioral: Negative for confusion, decreased concentration, dysphoric mood, hallucinations, sleep disturbance and suicidal ideas. The patient is not nervous/anxious.    All other systems reviewed and are negative.        Objective:      Physical Exam  Vitals and nursing note reviewed.   Constitutional:       General: He is not in acute distress.     Appearance: He is well-developed.   Neck:      Thyroid: No thyromegaly.      Vascular: No JVD.   Cardiovascular:      Rate and Rhythm: Normal rate and regular rhythm.      Heart sounds: Normal heart sounds. No murmur heard.  Pulmonary:      Effort: Pulmonary effort is normal. No respiratory distress.      Breath sounds: Normal breath sounds. No wheezing or rales.   Abdominal:      General: There is no distension.      Palpations: Abdomen is soft. There is no mass.      Tenderness: There is no abdominal tenderness. There is no guarding.   Musculoskeletal:      Right lower leg: No edema.      Left lower leg: No edema.   Lymphadenopathy:      Cervical: No cervical adenopathy.   Skin:     Capillary Refill: Capillary refill takes less than 2 seconds.      Findings: No rash.   Neurological:      General: No focal deficit present.      Mental Status: He is alert and oriented to person, place, and time.      Cranial Nerves: No cranial nerve deficit.      Coordination: Coordination normal.   Psychiatric:         Mood and Affect: Mood normal.         Behavior: Behavior normal.         Thought Content: Thought content normal.         Judgment: Judgment normal.         Assessment:       Problem List Items Addressed This Visit     Pure hypercholesterolemia    Rhinitis    Essential hypertension    Grief at loss of child    Relevant Medications    sertraline (ZOLOFT) 100 MG tablet    GERD with  esophagitis    Anxiety      Other Visit Diagnoses     Well adult exam    -  Primary    Relevant Orders    PSA, Screening    Lipid Panel    Comprehensive Metabolic Panel    Colon cancer screening        Relevant Orders    Case Request Endoscopy: COLONOSCOPY (Completed)          Plan:     Well adult exam  -     PSA, Screening; Future; Expected date: 03/07/2022  -     Lipid Panel; Future; Expected date: 03/07/2022  -     Comprehensive Metabolic Panel; Future; Expected date: 03/07/2022    Essential hypertension    Anxiety    Grief at loss of child  -     sertraline (ZOLOFT) 100 MG tablet; Take 1 tablet (100 mg total) by mouth once daily.  Dispense: 90 tablet; Refill: 3    Pure hypercholesterolemia    Non-seasonal allergic rhinitis due to other allergic trigger    Gastroesophageal reflux disease with esophagitis without hemorrhage    Colon cancer screening  -     Case Request Endoscopy: COLONOSCOPY    HM issues d/w patient, shingrix recommended. Colonoscopy discussed. Maintain Dig med, f/u yearly as he is doing so. Maintain meds but increase sertraline. He and wife will let me know how he does. We discussed lipids and mild elevation LFTs, he will moderate etoh intake, wotk on D&E, weight loss. If not improved with yearly labs, start statin.  Scribe Attestation:   I, Andrei De Leon, am scribing for, and in the presence of, Dr. Emiliano Hernández Jr. I performed the above scribed service and the documentation accurately describes the services I performed. I attest to the accuracy of the note.    I, Dr. Emiliano Hernández Jr, reviewed documentation as scribed above. I personally performed the services described in this documentation.  I agree that the record reflects my personal performance and is accurate and complete. Emiliano Hernández Jr., MD.  03/07/2022

## 2022-03-08 ENCOUNTER — PATIENT MESSAGE (OUTPATIENT)
Dept: ENDOSCOPY | Facility: HOSPITAL | Age: 52
End: 2022-03-08
Payer: COMMERCIAL

## 2022-03-08 DIAGNOSIS — Z01.818 PRE-OP TESTING: Primary | ICD-10-CM

## 2022-03-08 RX ORDER — SODIUM, POTASSIUM,MAG SULFATES 17.5-3.13G
1 SOLUTION, RECONSTITUTED, ORAL ORAL DAILY
Qty: 1 KIT | Refills: 0 | Status: SHIPPED | OUTPATIENT
Start: 2022-03-08 | End: 2022-04-14 | Stop reason: SDUPTHER

## 2022-03-16 RX ORDER — PANTOPRAZOLE SODIUM 40 MG/1
40 TABLET, DELAYED RELEASE ORAL DAILY
Qty: 30 TABLET | Refills: 6 | Status: SHIPPED | OUTPATIENT
Start: 2022-03-16 | End: 2022-10-24 | Stop reason: SDUPTHER

## 2022-04-11 ENCOUNTER — PATIENT MESSAGE (OUTPATIENT)
Dept: RESEARCH | Facility: HOSPITAL | Age: 52
End: 2022-04-11
Payer: COMMERCIAL

## 2022-04-21 ENCOUNTER — TELEPHONE (OUTPATIENT)
Dept: PREADMISSION TESTING | Facility: HOSPITAL | Age: 52
End: 2022-04-21
Payer: COMMERCIAL

## 2022-04-21 NOTE — PRE-PROCEDURE INSTRUCTIONS
PAT call completed.  Patient educated on procedure instructions.  Medical history discussed and patient informed of arrival time of 9:00 AM on Monday, April 25, 2022 at the [facility], and was made aware of the limited-visitor policy, and that  is to remain during the entire visit.  All questions and concerns addressed.  Endoscopy instructions reviewed. Instructed no solid food, only clear liquids, the day before the procedure, then at 6 PM, the day before the procedure, drink the first half of the bowel prep, then at 2 AM, the morning of procedure, drink the second half of the prep, then do not eat or drink anything until after the procedure.  Also instructed to only take his blood pressure medications Chlorthalidone and his Micardis the morning of procedure with just a few small sips of water.  Pre-procedure covid testing not needed, patient is covid vaccinated.  Patient verbalized understanding of all instructions.

## 2022-04-21 NOTE — TELEPHONE ENCOUNTER
PAT call completed.  Patient educated on procedure instructions.  Medical history discussed and patient informed of arrival time of 9:00 AM on Monday, April 25, 2022 at the [facility], and was made aware of the limited-visitor policy, and that  is to remain during the entire visit.  All questions and concerns addressed.  Endoscopy instructions reviewed. Instructed no solid food, only clear liquids, the day before the procedure, then at 6 PM, the day before the procedure, drink the first half of the bowel prep, then at 2 AM, the morning of procedure, drink the second half of the prep, then do not eat or drink anything until after the procedure.  Also instructed to only take his blood pressure medications Chlorthalidone and his Micardis the morning of procedure with just a few small sips of water.  Pre-procedure covid testing not needed, patient is covid vaccinated.  Patient verbalized understanding of all instructions.

## 2022-04-22 ENCOUNTER — ANESTHESIA EVENT (OUTPATIENT)
Dept: ENDOSCOPY | Facility: HOSPITAL | Age: 52
End: 2022-04-22
Payer: COMMERCIAL

## 2022-04-22 NOTE — ANESTHESIA PREPROCEDURE EVALUATION
04/22/2022  Irineo Betts is a 52 y.o., male.  Past Medical History:   Diagnosis Date    Allergy     Benign heart murmur     Corneal ulcer 01/08/2019    right eye    Hypertension     Nephrolithiasis      Past Surgical History:   Procedure Laterality Date    ESOPHAGOGASTRODUODENOSCOPY N/A 11/15/2019    Procedure: EGD (ESOPHAGOGASTRODUODENOSCOPY);  Surgeon: Edilma Fleming MD;  Location: [facility];  Service: Endoscopy;  Laterality: N/A;    SINUS SURGERY      UNDESCENDED TESTICLE EXPLORATION       Current Facility-Administered Medications:     ondansetron disintegrating tablet 4 mg, 4 mg, Oral, Once PRN, Zaid Mcneill MD    Current Outpatient Medications:     chlorthalidone (HYGROTEN) 25 MG Tab, Take 1 tablet by mouth daily for blood pressure, Disp: 90 tablet, Rfl: 3    telmisartan (MICARDIS) 80 MG Tab, Take 1 tablet (80 mg total) by mouth every evening. (replaces losartan for BP), Disp: 90 tablet, Rfl: 1    Allergy Mix, Therapeutic SCIT refill, Disp: 1 Package, Rfl: 3    aspirin (ECOTRIN) 81 MG EC tablet, Take 81 mg by mouth once daily., Disp: , Rfl:     fluoride, sodium, (PREVIDENT 5000 PLUS) 1.1 % Crea, Apply to teeth at bed time., Disp: 51 g, Rfl: 1    fluticasone propionate (FLONASE) 50 mcg/actuation nasal spray, Instill 2 sprays (100 mcg total) by Each nostril 2 (two) times daily. (Patient taking differently: 2 sprays by Each Nostril route as needed.), Disp: 16 g, Rfl: 11    montelukast (SINGULAIR) 10 mg tablet, Take 1 tablet (10 mg total) by mouth every evening., Disp: 30 tablet, Rfl: 12    omega 3-dha-epa-fish oil 1,000 mg (120 mg-180 mg) Cap, Take 1 capsule by mouth 2 (two) times a day., Disp: , Rfl:     pantoprazole (PROTONIX) 40 MG tablet, Take 1 tablet (40 mg total) by mouth once daily., Disp: 30 tablet, Rfl: 6    promethazine-dextromethorphan (PROMETHAZINE-DM)  6.25-15 mg/5 mL Syrp, Take 5 mLs by mouth every 6 to 8 hours as needed., Disp: 180 mL, Rfl: 0    sertraline (ZOLOFT) 100 MG tablet, Take 1 tablet (100 mg total) by mouth once daily., Disp: 90 tablet, Rfl: 3    traZODone (DESYREL) 100 MG tablet, Take 1 tablet (100 mg total) by mouth every evening., Disp: 90 tablet, Rfl: 3      Pre-op Assessment    I have reviewed the Patient Summary Reports.     I have reviewed the Nursing Notes. I have reviewed the NPO Status.   I have reviewed the Medications.     Review of Systems  Anesthesia Hx:  No problems with previous Anesthesia  Neg history of prior surgery. Denies Family Hx of Anesthesia complications.   Denies Personal Hx of Anesthesia complications.   Social:  Non-Smoker, Social Alcohol Use    Cardiovascular:   Hypertension Benign heart murmur   Renal/:   Chronic Renal Disease    Endocrine:  Metabolic Disorders, Obesity / BMI > 30  Psych:   Psychiatric History anxiety depression          Physical Exam    Airway:  Mallampati: II / II  Mouth Opening: Normal  TM Distance: Normal  Tongue: Normal  Neck ROM: Normal ROM    Dental:  Intact        Anesthesia Plan  Type of Anesthesia, risks & benefits discussed:    Anesthesia Type: Gen Natural Airway  Intra-op Monitoring Plan: Standard ASA Monitors  Post Op Pain Control Plan: multimodal analgesia  Induction:  IV  Informed Consent: Informed consent signed with the Patient and all parties understand the risks and agree with anesthesia plan.  All questions answered.   ASA Score: 2  Day of Surgery Review of History & Physical: H&P Update referred to the surgeon/provider.    Ready For Surgery From Anesthesia Perspective.     .

## 2022-04-25 ENCOUNTER — HOSPITAL ENCOUNTER (OUTPATIENT)
Facility: HOSPITAL | Age: 52
Discharge: HOME | End: 2022-04-25
Attending: INTERNAL MEDICINE | Admitting: INTERNAL MEDICINE
Payer: COMMERCIAL

## 2022-04-25 ENCOUNTER — ANESTHESIA (OUTPATIENT)
Dept: ENDOSCOPY | Facility: HOSPITAL | Age: 52
End: 2022-04-25
Payer: COMMERCIAL

## 2022-04-25 VITALS
HEART RATE: 67 BPM | WEIGHT: 233.56 LBS | HEIGHT: 72 IN | OXYGEN SATURATION: 99 % | SYSTOLIC BLOOD PRESSURE: 114 MMHG | DIASTOLIC BLOOD PRESSURE: 74 MMHG | TEMPERATURE: 98 F | BODY MASS INDEX: 31.63 KG/M2 | RESPIRATION RATE: 16 BRPM

## 2022-04-25 DIAGNOSIS — Z12.12 ENCOUNTER FOR COLORECTAL CANCER SCREENING: Primary | ICD-10-CM

## 2022-04-25 DIAGNOSIS — Z12.11 ENCOUNTER FOR COLORECTAL CANCER SCREENING: Primary | ICD-10-CM

## 2022-04-25 PROCEDURE — 88305 TISSUE EXAM BY PATHOLOGIST: ICD-10-PCS | Mod: 26,,, | Performed by: PATHOLOGY

## 2022-04-25 PROCEDURE — 63600175 PHARM REV CODE 636 W HCPCS: Performed by: INTERNAL MEDICINE

## 2022-04-25 PROCEDURE — 45380 PR COLONOSCOPY,BIOPSY: ICD-10-PCS | Mod: PT,,, | Performed by: INTERNAL MEDICINE

## 2022-04-25 PROCEDURE — 37000009 HC ANESTHESIA EA ADD 15 MINS: Performed by: INTERNAL MEDICINE

## 2022-04-25 PROCEDURE — 27201012 HC FORCEPS, HOT/COLD, DISP: Performed by: INTERNAL MEDICINE

## 2022-04-25 PROCEDURE — D9220A PRA ANESTHESIA: ICD-10-PCS | Mod: 33,ANES,, | Performed by: ANESTHESIOLOGY

## 2022-04-25 PROCEDURE — D9220A PRA ANESTHESIA: Mod: 33,CRNA,, | Performed by: NURSE ANESTHETIST, CERTIFIED REGISTERED

## 2022-04-25 PROCEDURE — D9220A PRA ANESTHESIA: Mod: 33,ANES,, | Performed by: ANESTHESIOLOGY

## 2022-04-25 PROCEDURE — 88305 TISSUE EXAM BY PATHOLOGIST: CPT | Performed by: PATHOLOGY

## 2022-04-25 PROCEDURE — 45380 COLONOSCOPY AND BIOPSY: CPT | Performed by: INTERNAL MEDICINE

## 2022-04-25 PROCEDURE — 88305 TISSUE EXAM BY PATHOLOGIST: CPT | Mod: 26,,, | Performed by: PATHOLOGY

## 2022-04-25 PROCEDURE — 37000008 HC ANESTHESIA 1ST 15 MINUTES: Performed by: INTERNAL MEDICINE

## 2022-04-25 PROCEDURE — D9220A PRA ANESTHESIA: ICD-10-PCS | Mod: 33,CRNA,, | Performed by: NURSE ANESTHETIST, CERTIFIED REGISTERED

## 2022-04-25 PROCEDURE — 45380 COLONOSCOPY AND BIOPSY: CPT | Mod: PT,,, | Performed by: INTERNAL MEDICINE

## 2022-04-25 PROCEDURE — 63600175 PHARM REV CODE 636 W HCPCS: Performed by: NURSE ANESTHETIST, CERTIFIED REGISTERED

## 2022-04-25 RX ORDER — SODIUM CHLORIDE, SODIUM LACTATE, POTASSIUM CHLORIDE, CALCIUM CHLORIDE 600; 310; 30; 20 MG/100ML; MG/100ML; MG/100ML; MG/100ML
INJECTION, SOLUTION INTRAVENOUS CONTINUOUS
Status: DISCONTINUED | OUTPATIENT
Start: 2022-04-25 | End: 2022-04-25 | Stop reason: HOSPADM

## 2022-04-25 RX ORDER — PROPOFOL 10 MG/ML
INJECTION, EMULSION INTRAVENOUS
Status: DISCONTINUED | OUTPATIENT
Start: 2022-04-25 | End: 2022-04-25

## 2022-04-25 RX ORDER — LIDOCAINE HCL/PF 100 MG/5ML
SYRINGE (ML) INTRAVENOUS
Status: DISCONTINUED | OUTPATIENT
Start: 2022-04-25 | End: 2022-04-25

## 2022-04-25 RX ADMIN — PROPOFOL 20 MG: 10 INJECTION, EMULSION INTRAVENOUS at 09:04

## 2022-04-25 RX ADMIN — PROPOFOL 20 MG: 10 INJECTION, EMULSION INTRAVENOUS at 10:04

## 2022-04-25 RX ADMIN — SODIUM CHLORIDE, SODIUM LACTATE, POTASSIUM CHLORIDE, AND CALCIUM CHLORIDE: 600; 310; 30; 20 INJECTION, SOLUTION INTRAVENOUS at 09:04

## 2022-04-25 RX ADMIN — PROPOFOL 200 MG: 10 INJECTION, EMULSION INTRAVENOUS at 09:04

## 2022-04-25 RX ADMIN — Medication 50 MG: at 09:04

## 2022-04-25 NOTE — PLAN OF CARE
Discharge instructions reviewed with pt and spouse, handouts given, verbalized understanding with no further questions at this time. Dr. Fleming spoke to pt at bedside, reviewed procedure and answered questions aware they are awaiting biopsy results with MD telephone number provided per AVS sheet. VSS on RA, no pain or nausea noted, tolerating po fluids without difficulty, no other complaints noted. Fall precautions reviewed, consents in chart, PIV to be removed at discharge.

## 2022-04-25 NOTE — TRANSFER OF CARE
Anesthesia Transfer of Care Note    Patient: Irineo Betts    Procedure(s) Performed: Procedure(s) (LRB):  COLONOSCOPY (N/A)    Patient location: PACU    Anesthesia Type: general    Transport from OR: Transported from OR on room air with adequate spontaneous ventilation    Post pain: adequate analgesia    Post assessment: no apparent anesthetic complications and tolerated procedure well    Post vital signs: stable    Level of consciousness: awake    Nausea/Vomiting: no nausea/vomiting    Complications: none    Transfer of care protocol was followed      Last vitals:   Visit Vitals  /82 (BP Location: Right arm, Patient Position: Sitting)   Pulse 79   Temp 36.7 °C (98 °F) (Temporal)   Resp 18   Ht 6' (1.829 m)   Wt 106 kg (233 lb 9.2 oz)   SpO2 98%   BMI 31.68 kg/m²

## 2022-04-25 NOTE — ANESTHESIA POSTPROCEDURE EVALUATION
Anesthesia Post Evaluation    Patient: Irineo Betts    Procedure(s) Performed: Procedure(s) (LRB):  COLONOSCOPY (N/A)    Final Anesthesia Type: general      Patient location during evaluation: PACU  Patient participation: Yes- Able to Participate  Level of consciousness: awake and alert and oriented  Post-procedure vital signs: reviewed and stable  Pain management: adequate  Airway patency: patent    PONV status at discharge: No PONV  Anesthetic complications: no      Cardiovascular status: blood pressure returned to baseline, stable and hemodynamically stable  Respiratory status: unassisted  Hydration status: euvolemic  Follow-up not needed.          Vitals Value Taken Time   /73 04/25/22 1031   Temp 36.7 °C (98 °F) 04/25/22 1010   Pulse 67 04/25/22 1033   Resp 16 04/25/22 1033   SpO2 98 % 04/25/22 1033   Vitals shown include unvalidated device data.      No case tracking events are documented in the log.      Pain/Nadir Score: Nadir Score: 10 (4/25/2022 10:10 AM)

## 2022-04-25 NOTE — H&P
Short Stay Endoscopy History and Physical    PCP - TUCKER Hernández Jr, MD    Procedure - Colonoscopy  ASA - 2  Mallampati - per anesthesia  History of Anesthesia problems - no  Family history Anesthesia problems -  no     HPI:  This is a 52 y.o. male here for evaluation of :   Active Hospital Problems    Diagnosis  POA    *Encounter for colorectal cancer screening [Z12.11, Z12.12]  Not Applicable      Resolved Hospital Problems   No resolved problems to display.         Health Maintenance       Date Due Completion Date    HIV Screening Never done ---    Colorectal Cancer Screening Never done ---    Influenza Vaccine (1) 09/01/2021 11/4/2020    Shingles Vaccine (1 of 2) Never done ---    COVID-19 Vaccine (3 - Booster for Pfizer series) 04/26/2022 11/26/2021    TETANUS VACCINE 02/05/2023 2/5/2013    Lipid Panel 02/28/2023 2/28/2022          ROS:  CONSTITUTIONAL: Denies weight change,  fatigue, fevers, chills, night sweats.  CARDIOVASCULAR: Denies chest pain, shortness of breath, orthopnea and edema.  RESPIRATORY: Denies cough, hemoptysis, dyspnea, and wheezing.  GI: See HPI.    Medical History:   Past Medical History:   Diagnosis Date    Allergy     Benign heart murmur     Corneal ulcer 01/08/2019    right eye    Hypertension     Nephrolithiasis        Surgical History:   Past Surgical History:   Procedure Laterality Date    ESOPHAGOGASTRODUODENOSCOPY N/A 11/15/2019    Procedure: EGD (ESOPHAGOGASTRODUODENOSCOPY);  Surgeon: Edilma Fleming MD;  Location: [facility];  Service: Endoscopy;  Laterality: N/A;    SINUS SURGERY      UNDESCENDED TESTICLE EXPLORATION         Family History:   Family History   Problem Relation Age of Onset    Heart disease Father     Cancer Maternal Grandmother     Cancer Maternal Grandfather        Social History:   Social History     Tobacco Use    Smoking status: Never Smoker    Smokeless tobacco: Never Used   Substance Use Topics    Alcohol use: Yes     Comment: occassinally       Drug use: No       Allergies:   Review of patient's allergies indicates:  No Known Allergies    Medications:   No current facility-administered medications on file prior to encounter.     Current Outpatient Medications on File Prior to Encounter   Medication Sig Dispense Refill    chlorthalidone (HYGROTEN) 25 MG Tab Take 1 tablet by mouth daily for blood pressure 90 tablet 3    fluoride, sodium, (PREVIDENT 5000 PLUS) 1.1 % Crea Apply to teeth at bed time. 51 g 1    fluticasone propionate (FLONASE) 50 mcg/actuation nasal spray Instill 2 sprays (100 mcg total) by Each nostril 2 (two) times daily. (Patient taking differently: 2 sprays by Each Nostril route as needed.) 16 g 11    montelukast (SINGULAIR) 10 mg tablet Take 1 tablet (10 mg total) by mouth every evening. 30 tablet 12    omega 3-dha-epa-fish oil 1,000 mg (120 mg-180 mg) Cap Take 1 capsule by mouth 2 (two) times a day.      sertraline (ZOLOFT) 100 MG tablet Take 1 tablet (100 mg total) by mouth once daily. 90 tablet 3    telmisartan (MICARDIS) 80 MG Tab Take 1 tablet (80 mg total) by mouth every evening. (replaces losartan for BP) 90 tablet 1    traZODone (DESYREL) 100 MG tablet Take 1 tablet (100 mg total) by mouth every evening. 90 tablet 3    Allergy Mix Therapeutic SCIT refill 1 Package 3    aspirin (ECOTRIN) 81 MG EC tablet Take 81 mg by mouth once daily.      promethazine-dextromethorphan (PROMETHAZINE-DM) 6.25-15 mg/5 mL Syrp Take 5 mLs by mouth every 6 to 8 hours as needed. 180 mL 0       Physical Exam:  Vital Signs:   Vitals:    04/25/22 0911   BP: 134/82   Pulse: 79   Resp: 18   Temp: 98 °F (36.7 °C)     General Appearance: Well appearing in no acute distress  ENT: OP clear  Chest: CTA B  CV: RRR, no m/r/g  Abd: s/nt/nd/nabs  Ext: no edema    Labs:Reviewed    IMP:  Active Hospital Problems    Diagnosis  POA    *Encounter for colorectal cancer screening [Z12.11, Z12.12]  Not Applicable      Resolved Hospital Problems   No resolved  problems to display.         Plan:   I have explained the risks and benefits of colonoscopy to the patient including but not limited to bleeding, perforation, infection, and death. The patient wishes to proceed.

## 2022-04-25 NOTE — DISCHARGE SUMMARY
[facility] Endoscopy 1st Fl  Discharge Note  Short Stay    Procedure(s) (LRB):  COLONOSCOPY (N/A)    OUTCOME: Patient tolerated treatment/procedure well without complication and is now ready for discharge.    DISPOSITION: Home or Self Care    FINAL DIAGNOSIS:  Encounter for colorectal cancer screening    FOLLOWUP: With primary care provider    DISCHARGE INSTRUCTIONS:  No discharge procedures on file.

## 2022-04-25 NOTE — [employer] PATIENT INSTRUCTIONS
Discharge Summary/Instructions after an Endoscopic Procedure  Patient Name: Irineo Betts  Patient MRN: [id number]  Patient YOB: 1970 Monday, April 25, 2022  Edilma Fleming MD  Dear patient,  As a result of recent federal legislation (The Federal Cures Act), you may   receive lab or pathology results from your procedure in your [facility]   account before your physician is able to contact you. Your physician or   their representative will relay the results to you with their   recommendations at their soonest availability.  Thank you,  RESTRICTIONS:  During your procedure today, you received medications for sedation.  These   medications may affect your judgment, balance and coordination.  Therefore,   for 24 hours, you have the following restrictions:   - DO NOT drive a car, operate machinery, make legal/financial decisions,   sign important papers or drink alcohol.    ACTIVITY:  Today: no heavy lifting, straining or running due to procedural   sedation/anesthesia.  The following day: return to full activity including work.  DIET:  Eat and drink normally unless instructed otherwise.     TREATMENT FOR COMMON SIDE EFFECTS:  - Mild abdominal pain, nausea, belching, bloating or excessive gas:  rest,   eat lightly and use a heating pad.  - Sore Throat: treat with throat lozenges and/or gargle with warm salt   water.  - Because air was used during the procedure, expelling large amounts of air   from your rectum or belching is normal.  - If a bowel prep was taken, you may not have a bowel movement for 1-3 days.    This is normal.  SYMPTOMS TO WATCH FOR AND REPORT TO YOUR PHYSICIAN:  1. Abdominal pain or bloating, other than gas cramps.  2. Chest pain.  3. Back pain.  4. Signs of infection such as: chills or fever occurring within 24 hours   after the procedure.  5. Rectal bleeding, which would show as bright red, maroon, or black stools.   (A tablespoon of blood from the rectum is not serious,  especially if   hemorrhoids are present.)  6. Vomiting.  7. Weakness or dizziness.  GO DIRECTLY TO THE NEAREST EMERGENCY ROOM IF YOU HAVE ANY OF THE FOLLOWING:      Difficulty breathing              Chills and/or fever over 101 F   Persistent vomiting and/or vomiting blood   Severe abdominal pain   Severe chest pain   Black, tarry stools   Bleeding- more than one tablespoon   Any other symptom or condition that you feel may need urgent attention  Your doctor recommends these additional instructions:  If any biopsies were taken, your doctors clinic will contact you in 1 to 2   weeks with any results.  - Discharge patient to home (via wheelchair).   - Resume previous diet.   - Continue present medications.   - Await pathology results.   - Telephone GI clinic for pathology results in 2 weeks.   - Repeat colonoscopy in 3 years for surveillance.   - Patient has a contact number available for emergencies.  The signs and   symptoms of potential delayed complications were discussed with the   patient.  Return to normal activities tomorrow.  Written discharge   instructions were provided to the patient.  For questions, problems or results please call your physician Edilma Fleming MD at Work:  [phone]  If you have any questions about the above instructions, call the GI   department at [phone] or call the endoscopy unit at [phone]   from 7am until 3 pm.  [facility], EMERGENCY ROOM PHONE NUMBER:   [phone]  IF A COMPLICATION OR EMERGENCY SITUATION ARISES AND YOU ARE UNABLE TO REACH   YOUR PHYSICIAN - GO DIRECTLY TO THE EMERGENCY ROOM.  I have read or have had read to me these discharge instructions for my   procedure and have received a written copy.  I understand these   instructions and will follow-up with my physician if I have any questions.     __________________________________       _____________________________________  Nurse Signature                                           Patient/Designated   Responsible Party Signature  MD Edilma Rae MD  4/25/2022 10:08:41 AM  [employer]

## 2022-04-29 ENCOUNTER — PATIENT MESSAGE (OUTPATIENT)
Dept: GASTROENTEROLOGY | Facility: CLINIC | Age: 52
End: 2022-04-29
Payer: COMMERCIAL

## 2022-04-29 LAB
FINAL PATHOLOGIC DIAGNOSIS: NORMAL
GROSS: NORMAL
Lab: NORMAL

## 2022-05-15 DIAGNOSIS — I10 ESSENTIAL HYPERTENSION: ICD-10-CM

## 2022-05-15 DIAGNOSIS — F51.04 PSYCHOPHYSIOLOGICAL INSOMNIA: ICD-10-CM

## 2022-05-15 DIAGNOSIS — F41.9 ANXIETY: ICD-10-CM

## 2022-05-16 RX ORDER — TRAZODONE HYDROCHLORIDE 100 MG/1
100 TABLET ORAL NIGHTLY
Qty: 90 TABLET | Refills: 3 | Status: SHIPPED | OUTPATIENT
Start: 2022-05-16 | End: 2023-05-08 | Stop reason: SDUPTHER

## 2022-05-16 NOTE — TELEPHONE ENCOUNTER
No new care gaps identified.  [facility] Embedded Care Gaps. Reference number: [id number]. 5/15/2022   9:18:38 PM CDT

## 2022-05-16 NOTE — TELEPHONE ENCOUNTER
Refill Routing Note   Medication(s) are not appropriate for processing by [facility] for the following reason(s):      - Indication is outside of scope for [facility]  - Patient has been seen in the ED/Hospital since the last PCP visit    [facility] action(s):  Route          Medication reconciliation completed: No     Appointments  past 12m or future 3m with PCP    Date Provider   Last Visit   3/7/2022 SAROJ Hernández Jr., MD   Next Visit   Visit date not found SAROJ Hernández Jr., MD   ED visits in past 90 days: 0        Note composed:12:35 PM 05/16/2022

## 2022-05-17 RX ORDER — CHLORTHALIDONE 25 MG/1
TABLET ORAL
Qty: 90 TABLET | Refills: 3 | Status: SHIPPED | OUTPATIENT
Start: 2022-05-17 | End: 2023-05-14 | Stop reason: SDUPTHER

## 2022-06-15 RX ORDER — FLUTICASONE PROPIONATE 50 MCG
2 SPRAY, SUSPENSION (ML) NASAL 2 TIMES DAILY
Qty: 16 G | Refills: 11 | Status: SHIPPED | OUTPATIENT
Start: 2022-06-15 | End: 2023-06-18 | Stop reason: SDUPTHER

## 2022-08-30 ENCOUNTER — OFFICE VISIT (OUTPATIENT)
Dept: OPHTHALMOLOGY | Facility: CLINIC | Age: 52
End: 2022-08-30
Payer: COMMERCIAL

## 2022-08-30 DIAGNOSIS — H52.4 MYOPIA WITH ASTIGMATISM AND PRESBYOPIA, BILATERAL: ICD-10-CM

## 2022-08-30 DIAGNOSIS — H52.203 MYOPIA WITH ASTIGMATISM AND PRESBYOPIA, BILATERAL: ICD-10-CM

## 2022-08-30 DIAGNOSIS — H52.13 MYOPIA WITH ASTIGMATISM AND PRESBYOPIA, BILATERAL: ICD-10-CM

## 2022-08-30 DIAGNOSIS — H53.8 BLURRED VISION, BILATERAL: Primary | ICD-10-CM

## 2022-08-30 PROCEDURE — 99999 PR PBB SHADOW E&M-EST. PATIENT-LVL III: CPT | Mod: PBBFAC,,, | Performed by: OPTOMETRIST

## 2022-08-30 PROCEDURE — 92014 COMPRE OPH EXAM EST PT 1/>: CPT | Mod: S$GLB,,, | Performed by: OPTOMETRIST

## 2022-08-30 PROCEDURE — 92015 DETERMINE REFRACTIVE STATE: CPT | Mod: S$GLB,,, | Performed by: OPTOMETRIST

## 2022-08-30 PROCEDURE — 92014 PR EYE EXAM, EST PATIENT,COMPREHESV: ICD-10-PCS | Mod: S$GLB,,, | Performed by: OPTOMETRIST

## 2022-08-30 PROCEDURE — 92015 PR REFRACTION: ICD-10-PCS | Mod: S$GLB,,, | Performed by: OPTOMETRIST

## 2022-08-30 PROCEDURE — 99999 PR PBB SHADOW E&M-EST. PATIENT-LVL III: ICD-10-PCS | Mod: PBBFAC,,, | Performed by: OPTOMETRIST

## 2022-08-30 NOTE — PROGRESS NOTES
HPI     Annual Exam            Comments: Vision changes since last eye exam?: not really, past month or   so struggling with distance with glasses   Any eye pain today: no  Other ocular symptoms: no  Interested in contact lens fitting today? no                     Last edited by Emmy Back MA on 8/30/2022  3:23 PM.            Assessment /Plan     For exam results, see Encounter Report.    Blurred vision, bilateral  Myopia with astigmatism and presbyopia, bilateral    Eyeglass Final Rx       Eyeglass Final Rx         Sphere Cylinder Axis Add    Right -0.75 +1.25 020 +2.00    Left -1.25 +1.25 160 +2.00      Expiration Date: 8/30/2023                    RTC 1 yr for undilated eye exam or PRN if any problems.   Discussed above and answered questions.

## 2022-09-09 DIAGNOSIS — J33.9 NASAL POLYPOSIS: ICD-10-CM

## 2022-09-09 DIAGNOSIS — J32.4 CHRONIC PANSINUSITIS: ICD-10-CM

## 2022-09-09 RX ORDER — MONTELUKAST SODIUM 10 MG/1
10 TABLET ORAL NIGHTLY
Qty: 30 TABLET | Refills: 12 | Status: SHIPPED | OUTPATIENT
Start: 2022-09-09 | End: 2023-09-25 | Stop reason: SDUPTHER

## 2022-10-05 ENCOUNTER — PATIENT OUTREACH (OUTPATIENT)
Dept: ADMINISTRATIVE | Facility: HOSPITAL | Age: 52
End: 2022-10-05
Payer: COMMERCIAL

## 2022-10-21 NOTE — PROGRESS NOTES
HPI     Eye Problem      Additional comments: Ulcer OD              Comments     The patient states his right eye is doing much better and his vision is   improving.    1. Cornea Ulcer OD    Pred A TID OD  Vigamox(ran out yesterday) and Poly Every hour  Valtrex          Last edited by Uzma Cheema on 1/14/2019  8:20 AM. (History)            Assessment /Plan     For exam results, see Encounter Report.      ICD-10-CM ICD-9-CM    1. Cornea ulcer, right H16.001 370.00        Pred A TID OD  Vigamox  and Poly Every Every other hour  Valtrex      RETURN TO CLINIC 4 days                7

## 2022-10-23 ENCOUNTER — PATIENT MESSAGE (OUTPATIENT)
Dept: ADMINISTRATIVE | Facility: OTHER | Age: 52
End: 2022-10-23
Payer: COMMERCIAL

## 2022-10-25 RX ORDER — PANTOPRAZOLE SODIUM 40 MG/1
40 TABLET, DELAYED RELEASE ORAL DAILY
Qty: 30 TABLET | Refills: 6 | Status: SHIPPED | OUTPATIENT
Start: 2022-10-25 | End: 2023-06-18 | Stop reason: SDUPTHER

## 2022-10-29 ENCOUNTER — IMMUNIZATION (OUTPATIENT)
Dept: INTERNAL MEDICINE | Facility: CLINIC | Age: 52
End: 2022-10-29
Payer: COMMERCIAL

## 2022-10-29 PROCEDURE — 90471 FLU VACCINE (QUAD) GREATER THAN OR EQUAL TO 3YO PRESERVATIVE FREE IM: ICD-10-PCS | Mod: S$GLB,,, | Performed by: FAMILY MEDICINE

## 2022-10-29 PROCEDURE — 90686 IIV4 VACC NO PRSV 0.5 ML IM: CPT | Mod: S$GLB,,, | Performed by: FAMILY MEDICINE

## 2022-10-29 PROCEDURE — 90471 IMMUNIZATION ADMIN: CPT | Mod: S$GLB,,, | Performed by: FAMILY MEDICINE

## 2022-10-29 PROCEDURE — 90686 FLU VACCINE (QUAD) GREATER THAN OR EQUAL TO 3YO PRESERVATIVE FREE IM: ICD-10-PCS | Mod: S$GLB,,, | Performed by: FAMILY MEDICINE

## 2023-03-01 ENCOUNTER — LAB VISIT (OUTPATIENT)
Dept: LAB | Facility: HOSPITAL | Age: 53
End: 2023-03-01
Attending: PEDIATRICS
Payer: COMMERCIAL

## 2023-03-01 DIAGNOSIS — Z00.00 WELL ADULT EXAM: ICD-10-CM

## 2023-03-01 LAB
ALBUMIN SERPL BCP-MCNC: 4.2 G/DL (ref 3.5–5.2)
ALP SERPL-CCNC: 79 U/L (ref 55–135)
ALT SERPL W/O P-5'-P-CCNC: 42 U/L (ref 10–44)
ANION GAP SERPL CALC-SCNC: 7 MMOL/L (ref 8–16)
AST SERPL-CCNC: 20 U/L (ref 10–40)
BILIRUB SERPL-MCNC: 0.8 MG/DL (ref 0.1–1)
BUN SERPL-MCNC: 17 MG/DL (ref 6–20)
CALCIUM SERPL-MCNC: 10.2 MG/DL (ref 8.7–10.5)
CHLORIDE SERPL-SCNC: 99 MMOL/L (ref 95–110)
CHOLEST SERPL-MCNC: 215 MG/DL (ref 120–199)
CHOLEST/HDLC SERPL: 5.7 {RATIO} (ref 2–5)
CO2 SERPL-SCNC: 32 MMOL/L (ref 23–29)
COMPLEXED PSA SERPL-MCNC: 0.81 NG/ML (ref 0–4)
CREAT SERPL-MCNC: 0.9 MG/DL (ref 0.5–1.4)
EST. GFR  (NO RACE VARIABLE): >60 ML/MIN/1.73 M^2
GLUCOSE SERPL-MCNC: 106 MG/DL (ref 70–110)
HDLC SERPL-MCNC: 38 MG/DL (ref 40–75)
HDLC SERPL: 17.7 % (ref 20–50)
LDLC SERPL CALC-MCNC: 148.4 MG/DL (ref 63–159)
NONHDLC SERPL-MCNC: 177 MG/DL
POTASSIUM SERPL-SCNC: 4.3 MMOL/L (ref 3.5–5.1)
PROT SERPL-MCNC: 7.5 G/DL (ref 6–8.4)
SODIUM SERPL-SCNC: 138 MMOL/L (ref 136–145)
TRIGL SERPL-MCNC: 143 MG/DL (ref 30–150)

## 2023-03-01 PROCEDURE — 80061 LIPID PANEL: CPT | Performed by: PEDIATRICS

## 2023-03-01 PROCEDURE — 84153 ASSAY OF PSA TOTAL: CPT | Performed by: PEDIATRICS

## 2023-03-01 PROCEDURE — 80053 COMPREHEN METABOLIC PANEL: CPT | Performed by: PEDIATRICS

## 2023-03-01 PROCEDURE — 36415 COLL VENOUS BLD VENIPUNCTURE: CPT | Performed by: PEDIATRICS

## 2023-03-08 ENCOUNTER — OFFICE VISIT (OUTPATIENT)
Dept: INTERNAL MEDICINE | Facility: CLINIC | Age: 53
End: 2023-03-08
Payer: COMMERCIAL

## 2023-03-08 ENCOUNTER — PATIENT MESSAGE (OUTPATIENT)
Dept: INTERNAL MEDICINE | Facility: CLINIC | Age: 53
End: 2023-03-08

## 2023-03-08 VITALS
HEART RATE: 81 BPM | TEMPERATURE: 97 F | WEIGHT: 233.25 LBS | SYSTOLIC BLOOD PRESSURE: 118 MMHG | HEIGHT: 72 IN | BODY MASS INDEX: 31.59 KG/M2 | OXYGEN SATURATION: 99 % | DIASTOLIC BLOOD PRESSURE: 80 MMHG

## 2023-03-08 DIAGNOSIS — F41.9 ANXIETY: ICD-10-CM

## 2023-03-08 DIAGNOSIS — E78.00 PURE HYPERCHOLESTEROLEMIA: ICD-10-CM

## 2023-03-08 DIAGNOSIS — Z13.1 DIABETES MELLITUS SCREENING: ICD-10-CM

## 2023-03-08 DIAGNOSIS — Z00.00 WELL ADULT EXAM: Primary | ICD-10-CM

## 2023-03-08 DIAGNOSIS — K21.00 GASTROESOPHAGEAL REFLUX DISEASE WITH ESOPHAGITIS WITHOUT HEMORRHAGE: ICD-10-CM

## 2023-03-08 DIAGNOSIS — I10 ESSENTIAL HYPERTENSION: ICD-10-CM

## 2023-03-08 PROCEDURE — 3074F PR MOST RECENT SYSTOLIC BLOOD PRESSURE < 130 MM HG: ICD-10-PCS | Mod: CPTII,S$GLB,, | Performed by: PEDIATRICS

## 2023-03-08 PROCEDURE — 3008F BODY MASS INDEX DOCD: CPT | Mod: CPTII,S$GLB,, | Performed by: PEDIATRICS

## 2023-03-08 PROCEDURE — 3008F PR BODY MASS INDEX (BMI) DOCUMENTED: ICD-10-PCS | Mod: CPTII,S$GLB,, | Performed by: PEDIATRICS

## 2023-03-08 PROCEDURE — 99396 PR PREVENTIVE VISIT,EST,40-64: ICD-10-PCS | Mod: S$GLB,,, | Performed by: PEDIATRICS

## 2023-03-08 PROCEDURE — 1159F PR MEDICATION LIST DOCUMENTED IN MEDICAL RECORD: ICD-10-PCS | Mod: CPTII,S$GLB,, | Performed by: PEDIATRICS

## 2023-03-08 PROCEDURE — 4010F ACE/ARB THERAPY RXD/TAKEN: CPT | Mod: CPTII,S$GLB,, | Performed by: PEDIATRICS

## 2023-03-08 PROCEDURE — 3074F SYST BP LT 130 MM HG: CPT | Mod: CPTII,S$GLB,, | Performed by: PEDIATRICS

## 2023-03-08 PROCEDURE — 4010F PR ACE/ARB THEARPY RXD/TAKEN: ICD-10-PCS | Mod: CPTII,S$GLB,, | Performed by: PEDIATRICS

## 2023-03-08 PROCEDURE — 1160F PR REVIEW ALL MEDS BY PRESCRIBER/CLIN PHARMACIST DOCUMENTED: ICD-10-PCS | Mod: CPTII,S$GLB,, | Performed by: PEDIATRICS

## 2023-03-08 PROCEDURE — 1160F RVW MEDS BY RX/DR IN RCRD: CPT | Mod: CPTII,S$GLB,, | Performed by: PEDIATRICS

## 2023-03-08 PROCEDURE — 1159F MED LIST DOCD IN RCRD: CPT | Mod: CPTII,S$GLB,, | Performed by: PEDIATRICS

## 2023-03-08 PROCEDURE — 3079F PR MOST RECENT DIASTOLIC BLOOD PRESSURE 80-89 MM HG: ICD-10-PCS | Mod: CPTII,S$GLB,, | Performed by: PEDIATRICS

## 2023-03-08 PROCEDURE — 99999 PR PBB SHADOW E&M-EST. PATIENT-LVL IV: ICD-10-PCS | Mod: PBBFAC,,, | Performed by: PEDIATRICS

## 2023-03-08 PROCEDURE — 99999 PR PBB SHADOW E&M-EST. PATIENT-LVL IV: CPT | Mod: PBBFAC,,, | Performed by: PEDIATRICS

## 2023-03-08 PROCEDURE — 3079F DIAST BP 80-89 MM HG: CPT | Mod: CPTII,S$GLB,, | Performed by: PEDIATRICS

## 2023-03-08 PROCEDURE — 99396 PREV VISIT EST AGE 40-64: CPT | Mod: S$GLB,,, | Performed by: PEDIATRICS

## 2023-03-08 RX ORDER — ROSUVASTATIN CALCIUM 10 MG/1
10 TABLET, COATED ORAL DAILY
Qty: 90 TABLET | Refills: 3 | Status: SHIPPED | OUTPATIENT
Start: 2023-03-08 | End: 2024-03-06 | Stop reason: SDUPTHER

## 2023-03-08 NOTE — PROGRESS NOTES
Subjective:       Patient ID: Irineo Betts is a 53 y.o. male.    Chief Complaint: Annual Exam    Annual     Irineo Betts is a 53 y.o. male who presents to clinic for a annual follow up. Wife is not present. No further concerns for his memory.       1) HTN: on ARB and chlorthalidone, doing well, In dig htn clinic, B/P high normal. Weight is stable but high.  2) Anxiety/grief: on trazodone and sertraline and sleeping well. No HI/SI. Stopped counseling, anxieties improved due to work and home stressors causing him to lose focus as noted above  3) Lipids: not working on D&E, ASCVD has increased to 6.1 %, never started statin  4) GERD with esophagitis: Confirmed by EGD. On PPI with no symptoms. swallowing difficulty: resolved with Protonix  5) Allergies: shot every 2 weeks helps, sees Dr. Kurtz      LABS REVIEWED AND DISCUSSED WITH PATIENT: PSA, lipid panel, and CMP    Review of Systems   Constitutional:  Negative for fever and unexpected weight change.   HENT:  Negative for congestion and rhinorrhea.    Eyes:  Negative for discharge and redness.   Respiratory:  Negative for cough, shortness of breath and wheezing.    Cardiovascular:  Negative for chest pain, palpitations and leg swelling.   Gastrointestinal:  Negative for abdominal pain, constipation, diarrhea and vomiting.   Genitourinary:  Negative for decreased urine volume and difficulty urinating.   Musculoskeletal:  Negative for arthralgias and joint swelling.   Skin:  Negative for rash and wound.   Neurological:  Negative for syncope and headaches.   Psychiatric/Behavioral:  Negative for behavioral problems and sleep disturbance.      Objective:      Physical Exam  Vitals and nursing note reviewed.   Constitutional:       General: He is not in acute distress.     Appearance: He is well-developed.   Neck:      Thyroid: No thyromegaly.      Vascular: No JVD.   Cardiovascular:      Rate and Rhythm: Normal rate and regular rhythm.      Heart sounds:  Normal heart sounds. No murmur heard.  Pulmonary:      Effort: Pulmonary effort is normal. No respiratory distress.      Breath sounds: Normal breath sounds. No wheezing or rales.   Abdominal:      General: There is no distension.      Palpations: Abdomen is soft. There is no mass.      Tenderness: There is no abdominal tenderness. There is no guarding.   Musculoskeletal:      Right lower leg: No edema.      Left lower leg: No edema.   Lymphadenopathy:      Cervical: No cervical adenopathy.   Skin:     Capillary Refill: Capillary refill takes less than 2 seconds.      Findings: No rash.   Neurological:      General: No focal deficit present.      Mental Status: He is alert and oriented to person, place, and time.      Cranial Nerves: No cranial nerve deficit.      Coordination: Coordination normal.   Psychiatric:         Mood and Affect: Mood normal.         Behavior: Behavior normal.         Thought Content: Thought content normal.         Judgment: Judgment normal.       Assessment:       1. Well adult exam    2. Pure hypercholesterolemia    3. Essential hypertension    4. Anxiety    5. Gastroesophageal reflux disease with esophagitis without hemorrhage          Plan:       Well adult exam    Pure hypercholesterolemia  -     rosuvastatin (CRESTOR) 10 MG tablet; Take 1 tablet (10 mg total) by mouth once daily.  Dispense: 90 tablet; Refill: 3  -     Lipid Panel; Future; Expected date: 03/08/2023  -     Comprehensive Metabolic Panel; Future; Expected date: 03/08/2023    Essential hypertension    Anxiety    Gastroesophageal reflux disease with esophagitis without hemorrhage    HMI d/w pt. B/P at goal, continue meds and dig htn. Lipids d/w pt, agrees to crestor. SE/RB d/w pt. F/u 6 months with labs.

## 2023-03-12 DIAGNOSIS — Z63.4 GRIEF AT LOSS OF CHILD: ICD-10-CM

## 2023-03-12 DIAGNOSIS — F43.21 GRIEF AT LOSS OF CHILD: ICD-10-CM

## 2023-03-12 SDOH — SOCIAL DETERMINANTS OF HEALTH (SDOH): DISSAPEARANCE AND DEATH OF FAMILY MEMBER: Z63.4

## 2023-03-13 ENCOUNTER — PATIENT MESSAGE (OUTPATIENT)
Dept: INTERNAL MEDICINE | Facility: CLINIC | Age: 53
End: 2023-03-13
Payer: COMMERCIAL

## 2023-03-13 DIAGNOSIS — E66.09 CLASS 1 OBESITY DUE TO EXCESS CALORIES WITH SERIOUS COMORBIDITY AND BODY MASS INDEX (BMI) OF 31.0 TO 31.9 IN ADULT: ICD-10-CM

## 2023-03-13 RX ORDER — SERTRALINE HYDROCHLORIDE 100 MG/1
100 TABLET, FILM COATED ORAL DAILY
Qty: 90 TABLET | Refills: 3 | Status: SHIPPED | OUTPATIENT
Start: 2023-03-13 | End: 2023-10-05 | Stop reason: SDUPTHER

## 2023-03-13 NOTE — TELEPHONE ENCOUNTER
No new care gaps identified.  [facility] Embedded Care Gaps. Reference number: [id number]. 3/12/2023   9:53:40 PM CDT

## 2023-03-13 NOTE — TELEPHONE ENCOUNTER
Refill Decision Note   Irineo Roxane  is requesting a refill authorization.  Brief Assessment and Rationale for Refill:  Approve     Medication Therapy Plan:       Medication Reconciliation Completed: No   Comments:     No Care Gaps recommended.     Note composed:9:03 AM 03/13/2023

## 2023-03-16 ENCOUNTER — PATIENT MESSAGE (OUTPATIENT)
Dept: INTERNAL MEDICINE | Facility: CLINIC | Age: 53
End: 2023-03-16
Payer: COMMERCIAL

## 2023-03-16 PROBLEM — E66.09 CLASS 1 OBESITY DUE TO EXCESS CALORIES WITH SERIOUS COMORBIDITY AND BODY MASS INDEX (BMI) OF 31.0 TO 31.9 IN ADULT: Status: ACTIVE | Noted: 2023-03-16

## 2023-03-16 PROBLEM — E66.811 CLASS 1 OBESITY DUE TO EXCESS CALORIES WITH SERIOUS COMORBIDITY AND BODY MASS INDEX (BMI) OF 31.0 TO 31.9 IN ADULT: Status: ACTIVE | Noted: 2023-03-16

## 2023-03-16 RX ORDER — LIRAGLUTIDE 6 MG/ML
INJECTION, SOLUTION SUBCUTANEOUS
Qty: 15 ML | Refills: 0 | Status: SHIPPED | OUTPATIENT
Start: 2023-03-16 | End: 2023-10-05

## 2023-03-16 NOTE — TELEPHONE ENCOUNTER
Start saxenda as covered by insurance by patient report. Start at 0.6 mg SC qd. Increase every week by 0.6 mg per day until at 3 mg a day. If gets significant nausea, stay at best dose. F/u in 6 weeks. Sent erx.

## 2023-03-30 ENCOUNTER — PATIENT MESSAGE (OUTPATIENT)
Dept: ADMINISTRATIVE | Facility: OTHER | Age: 53
End: 2023-03-30
Payer: COMMERCIAL

## 2023-04-13 ENCOUNTER — OFFICE VISIT (OUTPATIENT)
Dept: URGENT CARE | Facility: CLINIC | Age: 53
End: 2023-04-13
Payer: COMMERCIAL

## 2023-04-13 VITALS
TEMPERATURE: 98 F | DIASTOLIC BLOOD PRESSURE: 79 MMHG | WEIGHT: 235 LBS | HEIGHT: 72 IN | BODY MASS INDEX: 31.83 KG/M2 | OXYGEN SATURATION: 96 % | HEART RATE: 77 BPM | RESPIRATION RATE: 20 BRPM | SYSTOLIC BLOOD PRESSURE: 134 MMHG

## 2023-04-13 DIAGNOSIS — J06.9 UPPER RESPIRATORY TRACT INFECTION, UNSPECIFIED TYPE: Primary | ICD-10-CM

## 2023-04-13 DIAGNOSIS — R05.1 ACUTE COUGH: ICD-10-CM

## 2023-04-13 PROCEDURE — 99213 PR OFFICE/OUTPT VISIT, EST, LEVL III, 20-29 MIN: ICD-10-PCS | Mod: S$GLB,,, | Performed by: PHYSICIAN ASSISTANT

## 2023-04-13 PROCEDURE — 99213 OFFICE O/P EST LOW 20 MIN: CPT | Mod: S$GLB,,, | Performed by: PHYSICIAN ASSISTANT

## 2023-04-13 RX ORDER — PROMETHAZINE HYDROCHLORIDE AND DEXTROMETHORPHAN HYDROBROMIDE 6.25; 15 MG/5ML; MG/5ML
5 SYRUP ORAL EVERY 6 HOURS PRN
Qty: 118 ML | Refills: 0 | Status: SHIPPED | OUTPATIENT
Start: 2023-04-13 | End: 2023-10-05

## 2023-04-13 RX ORDER — BENZONATATE 200 MG/1
200 CAPSULE ORAL 3 TIMES DAILY PRN
Qty: 30 CAPSULE | Refills: 0 | Status: SHIPPED | OUTPATIENT
Start: 2023-04-13 | End: 2023-04-23

## 2023-04-13 RX ORDER — AZITHROMYCIN 250 MG/1
TABLET, FILM COATED ORAL
Qty: 6 TABLET | Refills: 0 | Status: SHIPPED | OUTPATIENT
Start: 2023-04-13 | End: 2023-10-05

## 2023-04-13 NOTE — PROGRESS NOTES
Subjective:      Patient ID: Irineo Betts is a 53 y.o. male.    Vitals:  height is 6' (1.829 m) and weight is 106.6 kg (235 lb). His oral temperature is 98.4 °F (36.9 °C). His blood pressure is 134/79 and his pulse is 77. His respiration is 20 and oxygen saturation is 96%.     Chief Complaint: Cough    Pt presents today with dry cough intermittent x1 wk, remains unchanged. Tried otc Delysm & started Mucenix yesterday morning, w/o relief. Seems to be worse when laying down at night. Denies fever/chills, sob, chest pain, body aches, congestion, pnd. Had seasonal allergies but resolved after sinus surgery about 8 years ago. No hx of asthma. Pt non-smoker.     Cough  This is a new problem. The current episode started in the past 7 days. The problem has been unchanged. The problem occurs every few minutes. The cough is Non-productive. Pertinent negatives include no chest pain, chills, ear congestion, ear pain, fever, headaches, heartburn, hemoptysis, myalgias, nasal congestion, postnasal drip, rash, rhinorrhea, sore throat, shortness of breath, sweats, weight loss or wheezing. Nothing aggravates the symptoms. He has tried OTC cough suppressant for the symptoms. The treatment provided mild relief. There is no history of asthma, bronchiectasis, bronchitis, COPD, emphysema, environmental allergies or pneumonia.     Constitution: Negative for chills, sweating, fatigue and fever.   HENT:  Negative for ear pain, congestion, postnasal drip and sore throat.    Cardiovascular:  Negative for chest pain, palpitations and sob on exertion.   Respiratory:  Positive for cough. Negative for chest tightness, bloody sputum, shortness of breath, wheezing and asthma.    Gastrointestinal: Negative.  Negative for heartburn.   Musculoskeletal:  Negative for muscle ache.   Skin:  Negative for rash.   Allergic/Immunologic: Negative for environmental allergies and asthma.   Neurological:  Negative for dizziness and headaches.     Objective:     Physical Exam   Constitutional: He appears well-developed.  Non-toxic appearance. He does not appear ill. No distress.   HENT:   Head: Normocephalic and atraumatic.   Ears:   Right Ear: External ear normal.   Left Ear: External ear normal.   Nose: Nose normal.   Eyes: Conjunctivae and EOM are normal.   Neck: Neck supple.   Cardiovascular: Normal rate, regular rhythm and normal heart sounds.   Pulmonary/Chest: Effort normal and breath sounds normal. No respiratory distress. He has no wheezes.   Abdominal: Normal appearance.   Musculoskeletal: Normal range of motion.         General: Normal range of motion.   Neurological: no focal deficit. He is alert. He displays no weakness. Gait normal.   Skin: Skin is warm, dry, not diaphoretic, not pale and no rash.   Psychiatric: His behavior is normal.     Assessment:     1. Upper respiratory tract infection, unspecified type    2. Acute cough        Plan:     VSS. Pt encouraged to use promethazine dm at night and tessalon perles during the day prn for cough. Full course of abx. Close f/u if symptoms worsen or fail to improve.     Upper respiratory tract infection, unspecified type  -     benzonatate (TESSALON) 200 MG capsule; Take 1 capsule (200 mg total) by mouth 3 (three) times daily as needed for Cough.  Dispense: 30 capsule; Refill: 0  -     promethazine-dextromethorphan (PROMETHAZINE-DM) 6.25-15 mg/5 mL Syrp; Take 5 mLs by mouth every 6 (six) hours as needed (cough). May cause drowsiness.  Dispense: 118 mL; Refill: 0  -     azithromycin (ZITHROMAX Z-ARIC) 250 MG tablet; Take 2 tablets (500 mg) on  Day 1,  followed by 1 tablet (250 mg) once daily on Days 2 through 5.  Dispense: 6 tablet; Refill: 0    Acute cough  -     benzonatate (TESSALON) 200 MG capsule; Take 1 capsule (200 mg total) by mouth 3 (three) times daily as needed for Cough.  Dispense: 30 capsule; Refill: 0  -     promethazine-dextromethorphan (PROMETHAZINE-DM) 6.25-15 mg/5 mL Syrp; Take 5 mLs  by mouth every 6 (six) hours as needed (cough). May cause drowsiness.  Dispense: 118 mL; Refill: 0

## 2023-04-24 ENCOUNTER — PATIENT MESSAGE (OUTPATIENT)
Dept: INTERNAL MEDICINE | Facility: CLINIC | Age: 53
End: 2023-04-24
Payer: COMMERCIAL

## 2023-04-24 DIAGNOSIS — R05.1 ACUTE COUGH: Primary | ICD-10-CM

## 2023-04-25 ENCOUNTER — OFFICE VISIT (OUTPATIENT)
Dept: URGENT CARE | Facility: CLINIC | Age: 53
End: 2023-04-25
Payer: COMMERCIAL

## 2023-04-25 VITALS
OXYGEN SATURATION: 96 % | RESPIRATION RATE: 18 BRPM | WEIGHT: 227 LBS | DIASTOLIC BLOOD PRESSURE: 79 MMHG | BODY MASS INDEX: 30.75 KG/M2 | HEIGHT: 72 IN | HEART RATE: 76 BPM | TEMPERATURE: 98 F | SYSTOLIC BLOOD PRESSURE: 124 MMHG

## 2023-04-25 DIAGNOSIS — R06.2 WHEEZE: ICD-10-CM

## 2023-04-25 DIAGNOSIS — R05.9 COUGH, UNSPECIFIED TYPE: ICD-10-CM

## 2023-04-25 DIAGNOSIS — J18.9 PNEUMONIA OF LEFT LUNG DUE TO INFECTIOUS ORGANISM, UNSPECIFIED PART OF LUNG: Primary | ICD-10-CM

## 2023-04-25 PROCEDURE — 99213 PR OFFICE/OUTPT VISIT, EST, LEVL III, 20-29 MIN: ICD-10-PCS | Mod: 25,S$GLB,,

## 2023-04-25 PROCEDURE — 99213 OFFICE O/P EST LOW 20 MIN: CPT | Mod: 25,S$GLB,,

## 2023-04-25 PROCEDURE — 71046 XR CHEST PA AND LATERAL: ICD-10-PCS | Mod: S$GLB,,, | Performed by: RADIOLOGY

## 2023-04-25 PROCEDURE — 94640 AIRWAY INHALATION TREATMENT: CPT | Mod: 59,S$GLB,,

## 2023-04-25 PROCEDURE — 71046 X-RAY EXAM CHEST 2 VIEWS: CPT | Mod: S$GLB,,, | Performed by: RADIOLOGY

## 2023-04-25 PROCEDURE — 94640 PR INHAL RX, AIRWAY OBST/DX SPUTUM INDUCT: ICD-10-PCS | Mod: 59,S$GLB,,

## 2023-04-25 RX ORDER — AMOXICILLIN AND CLAVULANATE POTASSIUM 875; 125 MG/1; MG/1
1 TABLET, FILM COATED ORAL 2 TIMES DAILY
Qty: 10 TABLET | Refills: 0 | Status: SHIPPED | OUTPATIENT
Start: 2023-04-25 | End: 2023-04-25

## 2023-04-25 RX ORDER — ALBUTEROL SULFATE 90 UG/1
2 AEROSOL, METERED RESPIRATORY (INHALATION) EVERY 6 HOURS PRN
Qty: 6.7 G | Refills: 0 | Status: SHIPPED | OUTPATIENT
Start: 2023-04-25 | End: 2023-10-05

## 2023-04-25 RX ORDER — ALBUTEROL SULFATE 0.83 MG/ML
2.5 SOLUTION RESPIRATORY (INHALATION)
Status: COMPLETED | OUTPATIENT
Start: 2023-04-25 | End: 2023-04-25

## 2023-04-25 RX ORDER — PROMETHAZINE HYDROCHLORIDE AND DEXTROMETHORPHAN HYDROBROMIDE 6.25; 15 MG/5ML; MG/5ML
5 SYRUP ORAL EVERY 4 HOURS PRN
Qty: 118 ML | Refills: 0 | Status: SHIPPED | OUTPATIENT
Start: 2023-04-25 | End: 2023-05-05

## 2023-04-25 RX ORDER — DOXYCYCLINE 100 MG/1
100 CAPSULE ORAL 2 TIMES DAILY
Qty: 14 CAPSULE | Refills: 0 | Status: SHIPPED | OUTPATIENT
Start: 2023-04-25 | End: 2023-04-25

## 2023-04-25 RX ORDER — IPRATROPIUM BROMIDE 0.5 MG/2.5ML
0.5 SOLUTION RESPIRATORY (INHALATION)
Status: COMPLETED | OUTPATIENT
Start: 2023-04-25 | End: 2023-04-25

## 2023-04-25 RX ORDER — DOXYCYCLINE 100 MG/1
100 CAPSULE ORAL 2 TIMES DAILY
Qty: 14 CAPSULE | Refills: 0 | Status: SHIPPED | OUTPATIENT
Start: 2023-04-25 | End: 2023-05-02

## 2023-04-25 RX ORDER — AMOXICILLIN AND CLAVULANATE POTASSIUM 875; 125 MG/1; MG/1
1 TABLET, FILM COATED ORAL 2 TIMES DAILY
Qty: 10 TABLET | Refills: 0 | Status: SHIPPED | OUTPATIENT
Start: 2023-04-25 | End: 2023-04-30

## 2023-04-25 RX ORDER — PREDNISONE 20 MG/1
40 TABLET ORAL
Status: COMPLETED | OUTPATIENT
Start: 2023-04-25 | End: 2023-04-25

## 2023-04-25 RX ADMIN — ALBUTEROL SULFATE 2.5 MG: 0.83 SOLUTION RESPIRATORY (INHALATION) at 06:04

## 2023-04-25 RX ADMIN — IPRATROPIUM BROMIDE 0.5 MG: 0.5 SOLUTION RESPIRATORY (INHALATION) at 06:04

## 2023-04-25 RX ADMIN — PREDNISONE 40 MG: 20 TABLET ORAL at 06:04

## 2023-04-25 NOTE — PROGRESS NOTES
Subjective:      Patient ID: Irineo Betts is a 53 y.o. male.    Vitals:  height is 6' (1.829 m) and weight is 103 kg (227 lb). His oral temperature is 97.8 °F (36.6 °C). His blood pressure is 124/79 and his pulse is 76. His respiration is 18 and oxygen saturation is 96%.     Chief Complaint: Cough    Pt presents today with cough x's 2 weeks. Pt was seen and tested 2 weeks ago for symptoms and reports that symptoms have gotten better but cough has never gone away. Pt was prescribed Z-isiah and promethazine-dm with moderate relief.  Patient denies any chest pain, or shortness a breath.    Cough  This is a new problem. The current episode started 1 to 4 weeks ago. The problem has been waxing and waning. The problem occurs constantly. The cough is Productive of sputum. The symptoms are aggravated by lying down and other (laughing).     Respiratory:  Positive for cough.     Objective:     Physical Exam   Constitutional: He is oriented to person, place, and time. He appears well-developed. He is cooperative.  Non-toxic appearance. He does not appear ill. No distress.   HENT:   Head: Normocephalic and atraumatic.   Ears:   Right Ear: Hearing, tympanic membrane, external ear and ear canal normal.   Left Ear: Hearing, tympanic membrane, external ear and ear canal normal.   Nose: Mucosal edema and rhinorrhea present. No purulent discharge or nasal deformity. No epistaxis. Right sinus exhibits no maxillary sinus tenderness and no frontal sinus tenderness. Left sinus exhibits no maxillary sinus tenderness and no frontal sinus tenderness.   Mouth/Throat: Uvula is midline, oropharynx is clear and moist and mucous membranes are normal. No trismus in the jaw. Normal dentition. No uvula swelling. Cobblestoning present. No oropharyngeal exudate, posterior oropharyngeal edema, posterior oropharyngeal erythema or tonsillar abscesses. Tonsils are 1+ on the right. Tonsils are 1+ on the left.   Eyes: Conjunctivae and lids are  normal. No scleral icterus.   Neck: Trachea normal and phonation normal. Neck supple. No edema present. No erythema present. No neck rigidity present.   Cardiovascular: Normal rate, regular rhythm, S1 normal, S2 normal, normal heart sounds and normal pulses.   Pulmonary/Chest: Effort normal. No accessory muscle usage or stridor. No apnea, no tachypnea and no bradypnea. No respiratory distress. He has no decreased breath sounds. He has wheezes in the left upper field and the left middle field. He has no rhonchi. He has no rales.   Abdominal: Normal appearance.   Musculoskeletal: Normal range of motion.         General: No deformity. Normal range of motion.   Neurological: He is alert and oriented to person, place, and time. He exhibits normal muscle tone. Coordination normal.   Skin: Skin is warm, dry, intact, not diaphoretic and not pale.   Psychiatric: His speech is normal and behavior is normal. Judgment and thought content normal.   Nursing note and vitals reviewed.  XR CHEST PA AND LATERAL    Result Date: 4/25/2023  EXAM: XR CHEST PA AND LATERAL CLINICAL HISTORY: Cough FINDINGS:  Right lung is clear.  Bandlike streaky opacity in the left lower lobe.  No pleural effusion or pneumothorax or pulmonary edema.  Normal heart size.  Intact thoracic osseous structures.      Subsegmental atelectasis and/or infiltrate in the left lower lobe. Finalized on: 4/25/2023 6:51 PM By:  Deon Norton MD [facility]# [id number]      2023-04-25 18:53:[id number]    [facility]     Assessment:     1. Pneumonia of left lung due to infectious organism, unspecified part of lung    2. Cough, unspecified type    3. Wheeze        Plan:       Pneumonia of left lung due to infectious organism, unspecified part of lung  -     promethazine-dextromethorphan (PROMETHAZINE-DM) 6.25-15 mg/5 mL Syrp; Take 5 mLs by mouth every 4 (four) hours as needed (cough).  Dispense: 118 mL; Refill: 0  -     amoxicillin-clavulanate 875-125mg (AUGMENTIN) 875-125 mg per tablet; Take 1  tablet by mouth 2 (two) times daily. for 5 days  Dispense: 10 tablet; Refill: 0  -     doxycycline (VIBRAMYCIN) 100 MG Cap; Take 1 capsule (100 mg total) by mouth 2 (two) times daily. for 7 days  Dispense: 14 capsule; Refill: 0    Cough, unspecified type  -     XR CHEST PA AND LATERAL; Future; Expected date: 04/25/2023    Wheeze  -     albuterol nebulizer solution 2.5 mg  -     predniSONE tablet 40 mg  -     albuterol nebulizer solution 2.5 mg  -     ipratropium 0.02 % nebulizer solution 0.5 mg  -     albuterol (PROVENTIL/VENTOLIN HFA) 90 mcg/actuation inhaler; Inhale 2 puffs into the lungs every 6 (six) hours as needed for Wheezing. Rescue  Dispense: 6.7 g; Refill: 0    Other orders  -     Discontinue: doxycycline (VIBRAMYCIN) 100 MG Cap; Take 1 capsule (100 mg total) by mouth 2 (two) times daily. for 7 days  Dispense: 14 capsule; Refill: 0  -     Discontinue: amoxicillin-clavulanate 875-125mg (AUGMENTIN) 875-125 mg per tablet; Take 1 tablet by mouth 2 (two) times daily. for 5 days  Dispense: 10 tablet; Refill: 0          Medical Decision Making:   Initial Assessment:   PT in room AAOX4, skin W/D, resp E/U, non toxic in appearance, NAD.  L upper and mid lung field mild wheezing noted  No Trismus, or sherice's, PT tolerating secretions, able to visualize uvula.   No drooling PT speaking in clear sentences.   Urgent Care Management:  VS stable, PT received 2 breathing treatments in clinic PT wheeze is slight, and improved PT reports breathing better after 2 treatments and reports will use Albuterol inhaler at home as there is no more albuterol available in clinic.  Discussed x-ray results that shows linear opacitiy discussed with patient I will treat this as a pneumonia.  Discussed with patient if this is not improve in the next 48-72 hours please follow-up with primary care provider.  Strict ED precautions such as but not limited to any increasing shortness a breath, development of chest pain etc. given to patient.   Discussed patient use albuterol inhaler as prescribed.  Discussed with patient to take doxycycline and Augmentin as prescribed for full duration treatment.  Discussed with patient doxycycline is given as patient has recently had azithromycin for the same cough and furthermore Augmentin was given as patient has had recent antibiotics.  Discussed with patient to get chest x-ray in 10-14 days.  Discussed with patient I will refill patient's promethazine DM as patient reports this has helped with patient's cough.  Patient agrees to treatment plan and verbalized understanding patient was ambulatory from clinic in no acute distress.

## 2023-04-28 ENCOUNTER — TELEPHONE (OUTPATIENT)
Dept: URGENT CARE | Facility: CLINIC | Age: 53
End: 2023-04-28
Payer: COMMERCIAL

## 2023-05-05 ENCOUNTER — HOSPITAL ENCOUNTER (OUTPATIENT)
Dept: RADIOLOGY | Facility: HOSPITAL | Age: 53
Discharge: HOME | End: 2023-05-05
Attending: PEDIATRICS
Payer: COMMERCIAL

## 2023-05-05 ENCOUNTER — TELEPHONE (OUTPATIENT)
Dept: INTERNAL MEDICINE | Facility: CLINIC | Age: 53
End: 2023-05-05
Payer: COMMERCIAL

## 2023-05-05 DIAGNOSIS — R05.1 ACUTE COUGH: ICD-10-CM

## 2023-05-05 DIAGNOSIS — J18.9 COMMUNITY ACQUIRED PNEUMONIA, UNSPECIFIED LATERALITY: Primary | ICD-10-CM

## 2023-05-05 PROCEDURE — 71046 XR CHEST PA AND LATERAL: ICD-10-PCS | Mod: 26,,, | Performed by: RADIOLOGY

## 2023-05-05 PROCEDURE — 71046 X-RAY EXAM CHEST 2 VIEWS: CPT | Mod: 26,,, | Performed by: RADIOLOGY

## 2023-05-05 PROCEDURE — 71046 X-RAY EXAM CHEST 2 VIEWS: CPT | Mod: TC

## 2023-05-08 DIAGNOSIS — F51.04 PSYCHOPHYSIOLOGICAL INSOMNIA: ICD-10-CM

## 2023-05-08 DIAGNOSIS — F41.9 ANXIETY: ICD-10-CM

## 2023-05-08 NOTE — TELEPHONE ENCOUNTER
Care Due:                  Date            Visit Type   Department     Provider  --------------------------------------------------------------------------------                                EP -                              PRIMARY      [facility] INTER[facility]  Last Visit: 03-      CARE ([facility])   [facility]       Emiliano Hernández                              EP -                              PRIMARY      [facility] INTER[facility]  Next Visit: 09-      CARE ([facility])   [facility]                                                            Last  Test          Frequency    Reason                     Performed    Due Date  --------------------------------------------------------------------------------    HBA1C.......  6 months...  liraglutide,.............  Not Found    Overdue    [employer] Embedded Care Due Messages. Reference number: [id number].   5/08/2023 10:50:43 AM CDT

## 2023-05-09 RX ORDER — TRAZODONE HYDROCHLORIDE 100 MG/1
100 TABLET ORAL NIGHTLY
Qty: 90 TABLET | Refills: 3 | Status: SHIPPED | OUTPATIENT
Start: 2023-05-09 | End: 2024-05-26

## 2023-05-09 NOTE — TELEPHONE ENCOUNTER
Refill Decision Note   Irineo Roxane  is requesting a refill authorization.  Brief Assessment and Rationale for Refill:  Approve     Medication Therapy Plan:       Medication Reconciliation Completed: No   Comments:     No Care Gaps recommended.     Note composed:3:43 AM 05/09/2023

## 2023-05-14 DIAGNOSIS — I10 ESSENTIAL HYPERTENSION: ICD-10-CM

## 2023-05-15 RX ORDER — CHLORTHALIDONE 25 MG/1
TABLET ORAL
Qty: 90 TABLET | Refills: 3 | Status: SHIPPED | OUTPATIENT
Start: 2023-05-15

## 2023-05-15 NOTE — TELEPHONE ENCOUNTER
Refill Routing Note   Medication(s) are not appropriate for processing by [facility] for the following reason(s):      No active prescription written by PCP    [facility] action(s):  Defer None identified            Appointments  past 12m or future 3m with PCP    Date Provider   Last Visit   3/8/2023 SAROJ Hernández Jr., MD   Next Visit   9/14/2023 SAROJ Hernández Jr., MD   ED visits in past 90 days: 0        Note composed:2:24 PM 05/15/2023

## 2023-05-15 NOTE — TELEPHONE ENCOUNTER
No care due was identified.  [facility] Embedded Care Due Messages. Reference number: [id number].   5/15/2023 2:13:47 PM CDT

## 2023-06-08 ENCOUNTER — HOSPITAL ENCOUNTER (OUTPATIENT)
Dept: RADIOLOGY | Facility: HOSPITAL | Age: 53
Discharge: HOME | End: 2023-06-08
Attending: PEDIATRICS
Payer: COMMERCIAL

## 2023-06-08 ENCOUNTER — OFFICE VISIT (OUTPATIENT)
Dept: ALLERGY | Facility: CLINIC | Age: 53
End: 2023-06-08
Payer: COMMERCIAL

## 2023-06-08 VITALS
WEIGHT: 232.81 LBS | TEMPERATURE: 98 F | SYSTOLIC BLOOD PRESSURE: 110 MMHG | HEIGHT: 72 IN | BODY MASS INDEX: 31.53 KG/M2 | DIASTOLIC BLOOD PRESSURE: 73 MMHG | HEART RATE: 79 BPM

## 2023-06-08 DIAGNOSIS — J18.9 COMMUNITY ACQUIRED PNEUMONIA, UNSPECIFIED LATERALITY: ICD-10-CM

## 2023-06-08 DIAGNOSIS — Z91.018 FOOD ALLERGY: Primary | ICD-10-CM

## 2023-06-08 PROCEDURE — 71046 X-RAY EXAM CHEST 2 VIEWS: CPT | Mod: TC

## 2023-06-08 PROCEDURE — 99204 OFFICE O/P NEW MOD 45 MIN: CPT | Mod: S$GLB,,, | Performed by: ALLERGY & IMMUNOLOGY

## 2023-06-08 PROCEDURE — 1159F PR MEDICATION LIST DOCUMENTED IN MEDICAL RECORD: ICD-10-PCS | Mod: CPTII,S$GLB,, | Performed by: ALLERGY & IMMUNOLOGY

## 2023-06-08 PROCEDURE — 71046 XR CHEST PA AND LATERAL: ICD-10-PCS | Mod: 26,,, | Performed by: RADIOLOGY

## 2023-06-08 PROCEDURE — 71046 X-RAY EXAM CHEST 2 VIEWS: CPT | Mod: 26,,, | Performed by: RADIOLOGY

## 2023-06-08 PROCEDURE — 4010F ACE/ARB THERAPY RXD/TAKEN: CPT | Mod: CPTII,S$GLB,, | Performed by: ALLERGY & IMMUNOLOGY

## 2023-06-08 PROCEDURE — 3074F PR MOST RECENT SYSTOLIC BLOOD PRESSURE < 130 MM HG: ICD-10-PCS | Mod: CPTII,S$GLB,, | Performed by: ALLERGY & IMMUNOLOGY

## 2023-06-08 PROCEDURE — 3008F BODY MASS INDEX DOCD: CPT | Mod: CPTII,S$GLB,, | Performed by: ALLERGY & IMMUNOLOGY

## 2023-06-08 PROCEDURE — 3074F SYST BP LT 130 MM HG: CPT | Mod: CPTII,S$GLB,, | Performed by: ALLERGY & IMMUNOLOGY

## 2023-06-08 PROCEDURE — 3078F PR MOST RECENT DIASTOLIC BLOOD PRESSURE < 80 MM HG: ICD-10-PCS | Mod: CPTII,S$GLB,, | Performed by: ALLERGY & IMMUNOLOGY

## 2023-06-08 PROCEDURE — 99204 PR OFFICE/OUTPT VISIT, NEW, LEVL IV, 45-59 MIN: ICD-10-PCS | Mod: S$GLB,,, | Performed by: ALLERGY & IMMUNOLOGY

## 2023-06-08 PROCEDURE — 1159F MED LIST DOCD IN RCRD: CPT | Mod: CPTII,S$GLB,, | Performed by: ALLERGY & IMMUNOLOGY

## 2023-06-08 PROCEDURE — 3078F DIAST BP <80 MM HG: CPT | Mod: CPTII,S$GLB,, | Performed by: ALLERGY & IMMUNOLOGY

## 2023-06-08 PROCEDURE — 3008F PR BODY MASS INDEX (BMI) DOCUMENTED: ICD-10-PCS | Mod: CPTII,S$GLB,, | Performed by: ALLERGY & IMMUNOLOGY

## 2023-06-08 PROCEDURE — 99999 PR PBB SHADOW E&M-EST. PATIENT-LVL IV: ICD-10-PCS | Mod: PBBFAC,,, | Performed by: ALLERGY & IMMUNOLOGY

## 2023-06-08 PROCEDURE — 4010F PR ACE/ARB THEARPY RXD/TAKEN: ICD-10-PCS | Mod: CPTII,S$GLB,, | Performed by: ALLERGY & IMMUNOLOGY

## 2023-06-08 PROCEDURE — 99999 PR PBB SHADOW E&M-EST. PATIENT-LVL IV: CPT | Mod: PBBFAC,,, | Performed by: ALLERGY & IMMUNOLOGY

## 2023-06-08 RX ORDER — EPINEPHRINE 0.3 MG/.3ML
1 INJECTION SUBCUTANEOUS ONCE
Qty: 2 EACH | Refills: 0 | Status: SHIPPED | OUTPATIENT
Start: 2023-06-08 | End: 2023-06-10

## 2023-06-08 RX ORDER — EPINEPHRINE 0.3 MG/.3ML
1 INJECTION SUBCUTANEOUS ONCE
Qty: 2 EACH | Refills: 0 | Status: SHIPPED | OUTPATIENT
Start: 2023-06-08 | End: 2023-06-08 | Stop reason: SDUPTHER

## 2023-06-08 NOTE — PROGRESS NOTES
Subjective:      Patient ID: Irineo Betts is a 53 y.o. male.    Self Referral    Chief Complaint:  Allergic Reaction      HPI: 53 year old male   2 years ago- seafood chimmichiga- lip swollen, itchy palms, chest and neck 30 minutes after eating. Benadryl and symptoms resolved  Last year- pepperoni pizza- exact same symptoms, took 2 benadryl and symptoms resolved  He has not gone tot he ER.  2 months ago Boiled crawfish-itchy, swollen lip- treated with benadryl.    Tuesday- [employer] meal- taco seasoning, ground beef- 1/2 hour- dry cough, itching, swollen lip, chest heavy. He took benadryl and itchy the next morning.      Wife and patient suspect it is a spice.    2 spiced in all of these that they do not use at home- Coriander and Marjoram      He eats crawfish since without symptoms.  Pork and ground beef and no symptoms.      Seen by Dr. Kurtz- pollen allergy and sinus surgery, allergy immunotherapy for a few years. He denies rhinitis or eye symptoms.  Alcohol- nasal congestion      Wife works in Radiology at the [employer]    Past Medical History:   Diagnosis Date    Allergy     Benign heart murmur     Corneal ulcer 01/08/2019    right eye    Hypertension     Nephrolithiasis         Family History   Problem Relation Age of Onset    Heart disease Father     Cancer Maternal Grandmother     Cancer Maternal Grandfather        Current Outpatient Medications on File Prior to Visit   Medication Sig Dispense Refill    aspirin (ECOTRIN) 81 MG EC tablet Take 81 mg by mouth once daily.      chlorthalidone (HYGROTEN) 25 MG Tab Take 1 tablet by mouth daily for blood pressure 90 tablet 3    fluticasone propionate (FLONASE) 50 mcg/actuation nasal spray Instill 2 sprays (100 mcg total) by Each nostril 2 (two) times daily. 16 g 11    montelukast (SINGULAIR) 10 mg tablet Take 1 tablet (10 mg total) by mouth every evening. 30 tablet 12    pantoprazole (PROTONIX) 40 MG tablet Take 1 tablet (40 mg total) by mouth once daily. 30  tablet 6    rosuvastatin (CRESTOR) 10 MG tablet Take 1 tablet (10 mg total) by mouth once daily. 90 tablet 3    telmisartan (MICARDIS) 80 MG Tab Take 1 tablet (80 mg total) by mouth every evening. (replaces losartan for BP) 90 tablet 1    traZODone (DESYREL) 100 MG tablet Take 1 tablet (100 mg total) by mouth every evening. 90 tablet 3    albuterol (PROVENTIL/VENTOLIN HFA) 90 mcg/actuation inhaler Inhale 2 puffs into the lungs every 6 (six) hours as needed for Wheezing. Rescue 6.7 g 0    azithromycin (ZITHROMAX Z-ARIC) 250 MG tablet Take 2 tablets (500 mg) on  Day 1,  followed by 1 tablet (250 mg) once daily on Days 2 through 5. (Patient not taking: Reported on 6/8/2023) 6 tablet 0    liraglutide, weight loss, (SAXENDA) 3 mg/0.5 mL (18 mg/3 mL) PnIj Start injecting 0.6 mg into the skin once daily. Increase by 0.6 mg a day every week. Max 3 mg/day. (Patient not taking: Reported on 6/8/2023) 15 mL 0    omega 3-dha-epa-fish oil 1,000 mg (120 mg-180 mg) Cap Take 1 capsule by mouth 2 (two) times a day.      promethazine-dextromethorphan (PROMETHAZINE-DM) 6.25-15 mg/5 mL Syrp Take 5 mLs by mouth every 6 (six) hours as needed (cough). May cause drowsiness. (Patient not taking: Reported on 6/8/2023) 118 mL 0    sertraline (ZOLOFT) 100 MG tablet Take 1 tablet (100 mg total) by mouth once daily. (Patient not taking: Reported on 6/8/2023) 90 tablet 3    [DISCONTINUED] EPINEPHrine (EPIPEN 2-ARIC) 0.3 mg/0.3 mL AtIn Inject 0.3 mLs (0.3 mg total) into the muscle once. for 1 dose (Patient not taking: Reported on 6/8/2023) 2 each 0     Current Facility-Administered Medications on File Prior to Visit   Medication Dose Route Frequency Provider Last Rate Last Admin    ondansetron disintegrating tablet 4 mg  4 mg Oral Once PRN Zaid Mcneill MD            Review of patient's allergies indicates:  No Known Allergies     Environmental History: Pets in the home: dogs (1) and cats (1).  Tobacco Smoke in Home: no  Review of Systems    Constitutional:  Negative for chills and fever.   HENT:  Negative for congestion and rhinorrhea.    Eyes:  Negative for discharge and itching.   Respiratory:  Negative for cough, shortness of breath and wheezing.    Gastrointestinal:  Negative for diarrhea and nausea.   Allergic/Immunologic: Positive for environmental allergies and food allergies.   Neurological:  Negative for facial asymmetry and speech difficulty.   Psychiatric/Behavioral:  Negative for behavioral problems and suicidal ideas.      Objective:   Physical Exam  Constitutional:       General: He is not in acute distress.     Appearance: Normal appearance. He is not ill-appearing or toxic-appearing.   HENT:      Head: Normocephalic and atraumatic.      Right Ear: Tympanic membrane and ear canal normal.      Left Ear: External ear normal.      Nose: Nose normal.   Eyes:      General:         Right eye: No discharge.         Left eye: No discharge.      Pupils: Pupils are equal, round, and reactive to light.   Neck:      Thyroid: No thyromegaly.   Cardiovascular:      Rate and Rhythm: Normal rate and regular rhythm.      Heart sounds: Normal heart sounds. No murmur heard.    No friction rub. No gallop.   Pulmonary:      Effort: Pulmonary effort is normal. No respiratory distress.      Breath sounds: Normal breath sounds. No stridor. No wheezing or rales.   Abdominal:      General: Bowel sounds are normal. There is no distension.      Palpations: Abdomen is soft. There is no mass.      Tenderness: There is no abdominal tenderness. There is no guarding.   Musculoskeletal:         General: Normal range of motion.      Cervical back: Normal range of motion and neck supple.   Skin:     General: Skin is warm and dry.      Findings: No rash.   Neurological:      Mental Status: He is alert and oriented to person, place, and time.         Assessment:      1. Food allergy        Plan:     Food allergy  -     EPINEPHrine (EPIPEN 2-ARIC) 0.3 mg/0.3 mL AtIn; Inject  0.3 mLs (0.3 mg total) into the muscle once. for 1 dose  Dispense: 2 each; Refill: 0  -     ALLERGEN-CORIANDER; Future; Expected date: 06/08/2023       No commercial test exist for Marjoram.  Recommend avoidance of Coriander and Marjoram, until further discussed at follow up.  Epipen 2 pack- discussed use, care and administration.      RTC 4 weeks     JOHANNA BAH spent a total of 33 minutes on the day of the visit.This includes face to face time and non-face to face time preparing to see the patient (eg, review of tests), obtaining and/or reviewing separately obtained history, documenting clinical information in the electronic or other health record, independently interpreting results and communicating results to the patient/family/caregiver, or care coordinator.

## 2023-06-18 DIAGNOSIS — J30.89 NON-SEASONAL ALLERGIC RHINITIS DUE TO OTHER ALLERGIC TRIGGER: Primary | ICD-10-CM

## 2023-06-19 RX ORDER — FLUTICASONE PROPIONATE 50 MCG
2 SPRAY, SUSPENSION (ML) NASAL 2 TIMES DAILY
Qty: 16 G | Refills: 11 | Status: SHIPPED | OUTPATIENT
Start: 2023-06-19

## 2023-06-19 NOTE — TELEPHONE ENCOUNTER
Refills requested on Protonix 40 mg QD, last refills given on 10/25/22 30 tablets with 6 refills; last office visit  09/12/2019

## 2023-06-20 RX ORDER — PANTOPRAZOLE SODIUM 40 MG/1
40 TABLET, DELAYED RELEASE ORAL DAILY
Qty: 30 TABLET | Refills: 6 | Status: SHIPPED | OUTPATIENT
Start: 2023-06-20 | End: 2024-01-28 | Stop reason: SDUPTHER

## 2023-09-25 DIAGNOSIS — J32.4 CHRONIC PANSINUSITIS: ICD-10-CM

## 2023-09-25 DIAGNOSIS — J33.9 NASAL POLYPOSIS: ICD-10-CM

## 2023-09-25 RX ORDER — MONTELUKAST SODIUM 10 MG/1
10 TABLET ORAL NIGHTLY
Qty: 30 TABLET | Refills: 12 | Status: SHIPPED | OUTPATIENT
Start: 2023-09-25

## 2023-09-28 ENCOUNTER — LAB VISIT (OUTPATIENT)
Dept: LAB | Facility: HOSPITAL | Age: 53
End: 2023-09-28
Attending: PEDIATRICS
Payer: COMMERCIAL

## 2023-09-28 DIAGNOSIS — E78.49 OTHER HYPERLIPIDEMIA: ICD-10-CM

## 2023-09-28 DIAGNOSIS — Z13.1 DIABETES MELLITUS SCREENING: ICD-10-CM

## 2023-09-28 DIAGNOSIS — E78.00 PURE HYPERCHOLESTEROLEMIA: ICD-10-CM

## 2023-09-28 LAB
ALBUMIN SERPL BCP-MCNC: 4.4 G/DL (ref 3.5–5.2)
ALP SERPL-CCNC: 85 U/L (ref 55–135)
ALT SERPL W/O P-5'-P-CCNC: 55 U/L (ref 10–44)
ANION GAP SERPL CALC-SCNC: 9 MMOL/L (ref 8–16)
AST SERPL-CCNC: 29 U/L (ref 10–40)
BILIRUB SERPL-MCNC: 1.4 MG/DL (ref 0.1–1)
BUN SERPL-MCNC: 16 MG/DL (ref 6–20)
CALCIUM SERPL-MCNC: 10.1 MG/DL (ref 8.7–10.5)
CHLORIDE SERPL-SCNC: 97 MMOL/L (ref 95–110)
CHOLEST SERPL-MCNC: 160 MG/DL (ref 120–199)
CHOLEST/HDLC SERPL: 3.7 {RATIO} (ref 2–5)
CO2 SERPL-SCNC: 31 MMOL/L (ref 23–29)
CREAT SERPL-MCNC: 0.8 MG/DL (ref 0.5–1.4)
EST. GFR  (NO RACE VARIABLE): >60 ML/MIN/1.73 M^2
ESTIMATED AVG GLUCOSE: 103 MG/DL (ref 68–131)
GLUCOSE SERPL-MCNC: 98 MG/DL (ref 70–110)
HBA1C MFR BLD: 5.2 % (ref 4–5.6)
HDLC SERPL-MCNC: 43 MG/DL (ref 40–75)
HDLC SERPL: 26.9 % (ref 20–50)
LDLC SERPL CALC-MCNC: 101.6 MG/DL (ref 63–159)
NONHDLC SERPL-MCNC: 117 MG/DL
POTASSIUM SERPL-SCNC: 3.8 MMOL/L (ref 3.5–5.1)
PROT SERPL-MCNC: 7.7 G/DL (ref 6–8.4)
SODIUM SERPL-SCNC: 137 MMOL/L (ref 136–145)
TRIGL SERPL-MCNC: 77 MG/DL (ref 30–150)
TSH SERPL DL<=0.005 MIU/L-ACNC: 1.78 UIU/ML (ref 0.4–4)

## 2023-09-28 PROCEDURE — 83036 HEMOGLOBIN GLYCOSYLATED A1C: CPT | Performed by: PEDIATRICS

## 2023-09-28 PROCEDURE — 36415 COLL VENOUS BLD VENIPUNCTURE: CPT | Performed by: PEDIATRICS

## 2023-09-28 PROCEDURE — 80053 COMPREHEN METABOLIC PANEL: CPT | Performed by: PEDIATRICS

## 2023-09-28 PROCEDURE — 80061 LIPID PANEL: CPT | Performed by: PEDIATRICS

## 2023-09-28 PROCEDURE — 84443 ASSAY THYROID STIM HORMONE: CPT | Performed by: PEDIATRICS

## 2023-10-02 NOTE — ED NOTES
PO challenge initiated per Dr Michael verbal order. Pt swallowed some water and states he felt the food in his throat go down. Pt then drank another few sips of water without difficulty and states he no longer feels anything in his throat. Notified Dr Michael who instructs to give a cup of water and see how he is able to tolerate. Pt given a cup of water. Will continue to monitor.   
Pt finished water without difficulty, nausea, or vomiting. Pt with no complaint at this time, stating he feels much better.   
0

## 2023-10-05 ENCOUNTER — OFFICE VISIT (OUTPATIENT)
Dept: INTERNAL MEDICINE | Facility: CLINIC | Age: 53
End: 2023-10-05
Payer: COMMERCIAL

## 2023-10-05 VITALS
OXYGEN SATURATION: 98 % | BODY MASS INDEX: 31.98 KG/M2 | HEIGHT: 72 IN | DIASTOLIC BLOOD PRESSURE: 74 MMHG | WEIGHT: 236.13 LBS | TEMPERATURE: 97 F | RESPIRATION RATE: 16 BRPM | HEART RATE: 74 BPM | SYSTOLIC BLOOD PRESSURE: 120 MMHG

## 2023-10-05 DIAGNOSIS — K21.00 GASTROESOPHAGEAL REFLUX DISEASE WITH ESOPHAGITIS WITHOUT HEMORRHAGE: ICD-10-CM

## 2023-10-05 DIAGNOSIS — Z12.5 SCREENING FOR PROSTATE CANCER: ICD-10-CM

## 2023-10-05 DIAGNOSIS — I10 ESSENTIAL HYPERTENSION: Primary | ICD-10-CM

## 2023-10-05 DIAGNOSIS — F41.9 ANXIETY: ICD-10-CM

## 2023-10-05 DIAGNOSIS — E78.00 PURE HYPERCHOLESTEROLEMIA: ICD-10-CM

## 2023-10-05 DIAGNOSIS — E66.09 CLASS 1 OBESITY DUE TO EXCESS CALORIES WITH SERIOUS COMORBIDITY AND BODY MASS INDEX (BMI) OF 31.0 TO 31.9 IN ADULT: ICD-10-CM

## 2023-10-05 PROBLEM — F43.21 GRIEF AT LOSS OF CHILD: Status: RESOLVED | Noted: 2019-04-10 | Resolved: 2023-10-05

## 2023-10-05 PROBLEM — Z63.4 GRIEF AT LOSS OF CHILD: Status: RESOLVED | Noted: 2019-04-10 | Resolved: 2023-10-05

## 2023-10-05 PROCEDURE — 3074F SYST BP LT 130 MM HG: CPT | Mod: CPTII,S$GLB,, | Performed by: PEDIATRICS

## 2023-10-05 PROCEDURE — 4010F PR ACE/ARB THEARPY RXD/TAKEN: ICD-10-PCS | Mod: CPTII,S$GLB,, | Performed by: PEDIATRICS

## 2023-10-05 PROCEDURE — 1159F MED LIST DOCD IN RCRD: CPT | Mod: CPTII,S$GLB,, | Performed by: PEDIATRICS

## 2023-10-05 PROCEDURE — 90686 FLU VACCINE (QUAD) GREATER THAN OR EQUAL TO 3YO PRESERVATIVE FREE IM: ICD-10-PCS | Mod: S$GLB,,, | Performed by: PEDIATRICS

## 2023-10-05 PROCEDURE — 99214 PR OFFICE/OUTPT VISIT, EST, LEVL IV, 30-39 MIN: ICD-10-PCS | Mod: 25,S$GLB,, | Performed by: PEDIATRICS

## 2023-10-05 PROCEDURE — 3044F PR MOST RECENT HEMOGLOBIN A1C LEVEL <7.0%: ICD-10-PCS | Mod: CPTII,S$GLB,, | Performed by: PEDIATRICS

## 2023-10-05 PROCEDURE — 90471 FLU VACCINE (QUAD) GREATER THAN OR EQUAL TO 3YO PRESERVATIVE FREE IM: ICD-10-PCS | Mod: S$GLB,,, | Performed by: PEDIATRICS

## 2023-10-05 PROCEDURE — 4010F ACE/ARB THERAPY RXD/TAKEN: CPT | Mod: CPTII,S$GLB,, | Performed by: PEDIATRICS

## 2023-10-05 PROCEDURE — 90471 IMMUNIZATION ADMIN: CPT | Mod: S$GLB,,, | Performed by: PEDIATRICS

## 2023-10-05 PROCEDURE — 1159F PR MEDICATION LIST DOCUMENTED IN MEDICAL RECORD: ICD-10-PCS | Mod: CPTII,S$GLB,, | Performed by: PEDIATRICS

## 2023-10-05 PROCEDURE — 3074F PR MOST RECENT SYSTOLIC BLOOD PRESSURE < 130 MM HG: ICD-10-PCS | Mod: CPTII,S$GLB,, | Performed by: PEDIATRICS

## 2023-10-05 PROCEDURE — 99999 PR PBB SHADOW E&M-EST. PATIENT-LVL IV: ICD-10-PCS | Mod: PBBFAC,,, | Performed by: PEDIATRICS

## 2023-10-05 PROCEDURE — 90472 TDAP VACCINE GREATER THAN OR EQUAL TO 7YO IM: ICD-10-PCS | Mod: S$GLB,,, | Performed by: PEDIATRICS

## 2023-10-05 PROCEDURE — 3044F HG A1C LEVEL LT 7.0%: CPT | Mod: CPTII,S$GLB,, | Performed by: PEDIATRICS

## 2023-10-05 PROCEDURE — 90715 TDAP VACCINE GREATER THAN OR EQUAL TO 7YO IM: ICD-10-PCS | Mod: S$GLB,,, | Performed by: PEDIATRICS

## 2023-10-05 PROCEDURE — 99214 OFFICE O/P EST MOD 30 MIN: CPT | Mod: 25,S$GLB,, | Performed by: PEDIATRICS

## 2023-10-05 PROCEDURE — 3008F BODY MASS INDEX DOCD: CPT | Mod: CPTII,S$GLB,, | Performed by: PEDIATRICS

## 2023-10-05 PROCEDURE — 90472 IMMUNIZATION ADMIN EACH ADD: CPT | Mod: S$GLB,,, | Performed by: PEDIATRICS

## 2023-10-05 PROCEDURE — 3008F PR BODY MASS INDEX (BMI) DOCUMENTED: ICD-10-PCS | Mod: CPTII,S$GLB,, | Performed by: PEDIATRICS

## 2023-10-05 PROCEDURE — 99999 PR PBB SHADOW E&M-EST. PATIENT-LVL IV: CPT | Mod: PBBFAC,,, | Performed by: PEDIATRICS

## 2023-10-05 PROCEDURE — 90715 TDAP VACCINE 7 YRS/> IM: CPT | Mod: S$GLB,,, | Performed by: PEDIATRICS

## 2023-10-05 PROCEDURE — 3078F DIAST BP <80 MM HG: CPT | Mod: CPTII,S$GLB,, | Performed by: PEDIATRICS

## 2023-10-05 PROCEDURE — 3078F PR MOST RECENT DIASTOLIC BLOOD PRESSURE < 80 MM HG: ICD-10-PCS | Mod: CPTII,S$GLB,, | Performed by: PEDIATRICS

## 2023-10-05 PROCEDURE — 90686 IIV4 VACC NO PRSV 0.5 ML IM: CPT | Mod: S$GLB,,, | Performed by: PEDIATRICS

## 2023-10-05 RX ORDER — SERTRALINE HYDROCHLORIDE 100 MG/1
100 TABLET, FILM COATED ORAL DAILY
Qty: 90 TABLET | Refills: 3 | Status: SHIPPED | OUTPATIENT
Start: 2023-10-05 | End: 2024-10-04

## 2023-10-05 NOTE — PROGRESS NOTES
Subjective     Patient ID: Irineo Betts is a 53 y.o. male.    Chief Complaint: Follow-up    Irineo Betts is a 53 y.o. male who presents to the clinic for a follow up. Pt states his friend, who is a home health nurse and drug representative, sells a generic brand of Mounjaro., would like to try for weight loss and would like to get a physician's opinion about this decision.       1) HTN: on ARB and chlorthalidone, doing well, In [facility], B/P high normal. Weight is stable but high.  2) Anxiety/grief: on trazodone, sleeping well, and weaned himself off sertraline. No HI/SI. Stopped counseling, anxieties this past month have been triggered due to having multiple things going on at one time, feeling anxious and that he begins to feel stressed. He states this occurs every day but isn't necessarily bad. He also reports he thought about going back to counseling.    3) Lipids: not working on D&E, ASCVD has decreased to 3.4 %, on crestor 10 mg, tolerating medication well   4) GERD with esophagitis: Confirmed by EGD. On PPI with no symptoms. swallowing difficulty: resolved with Protonix  5) Allergies: shot every 2 weeks helps, sees Dr. Kurtz              LABS REVIEWED AND DISCUSSED WITH PATIENT: TSH, lipid panel, A1C, and CMP      Review of Systems   Constitutional:  Negative for chills, diaphoresis, fatigue and fever.   HENT:  Negative for sore throat and trouble swallowing.    Respiratory:  Negative for cough and shortness of breath.    Cardiovascular:  Negative for chest pain.   Gastrointestinal:  Positive for reflux. Negative for abdominal pain, anal bleeding, constipation, diarrhea, nausea and vomiting.   Endocrine: Negative for cold intolerance, heat intolerance, polydipsia, polyphagia and polyuria.   Allergic/Immunologic: Positive for environmental allergies.   Psychiatric/Behavioral:  Negative for self-injury, sleep disturbance and suicidal ideas. The patient is nervous/anxious.    All other  systems reviewed and are negative.         Objective     Physical Exam  Constitutional:       General: He is not in acute distress.     Appearance: Normal appearance. He is obese. He is not ill-appearing or toxic-appearing.   Cardiovascular:      Rate and Rhythm: Normal rate and regular rhythm.      Pulses: Normal pulses.      Heart sounds: Normal heart sounds. No murmur heard.     No friction rub. No gallop.   Pulmonary:      Effort: Pulmonary effort is normal.      Breath sounds: Normal breath sounds.   Abdominal:      General: There is no distension.      Palpations: There is no mass.      Tenderness: There is no abdominal tenderness. There is no guarding or rebound.      Hernia: No hernia is present.   Neurological:      Mental Status: He is alert and oriented to person, place, and time.   Psychiatric:         Mood and Affect: Mood normal.         Behavior: Behavior normal.         Thought Content: Thought content normal.         Judgment: Judgment normal.            Assessment and Plan     1. Essential hypertension    2. Pure hypercholesterolemia  -     Lipid Panel; Future; Expected date: 10/05/2023  -     Comprehensive Metabolic Panel; Future; Expected date: 10/05/2023    3. Gastroesophageal reflux disease with esophagitis without hemorrhage    4. Anxiety  -     sertraline (ZOLOFT) 100 MG tablet; Take 1 tablet (100 mg total) by mouth once daily.  Dispense: 90 tablet; Refill: 3    5. Class 1 obesity due to excess calories with serious comorbidity and body mass index (BMI) of 31.0 to 31.9 in adult    6. Screening for prostate cancer  -     PSA, Screening; Future; Expected date: 10/05/2023    Other orders  -     Influenza - Quadrivalent (PF)  -     (In Office Administered) Tdap Vaccine        HMI discussed with pt. For anxiety, I instructed pt to restart sertraline 100 mg and consider counseling. Flu vaccinated. Tdap today. Shingles in pharmacy. Covid vaccines this month. At goal for B/P, lipids, and A1c.  Maintain meds, self monitoring D&E, weight moderation. F/U 6 months with labs. GLP1 inhibitor and its RB/CI/SE discussed with Pt. However, I reported to pt that if he went through this mounjaro program I would not be able to follow dosing because it was being compounded.              Follow up in about 6 months (around 4/5/2024).

## 2024-01-29 RX ORDER — PANTOPRAZOLE SODIUM 40 MG/1
40 TABLET, DELAYED RELEASE ORAL DAILY
Qty: 30 TABLET | Refills: 6 | Status: SHIPPED | OUTPATIENT
Start: 2024-01-29 | End: 2025-01-28

## 2024-01-29 NOTE — TELEPHONE ENCOUNTER
Refills requested on Protonix 40 mg QD; last refill given on 06/20/23 30 tabs with 6 refills; last office visit on 09/12/2019

## 2024-03-06 DIAGNOSIS — E78.00 PURE HYPERCHOLESTEROLEMIA: ICD-10-CM

## 2024-03-06 RX ORDER — ROSUVASTATIN CALCIUM 10 MG/1
10 TABLET, COATED ORAL DAILY
Qty: 90 TABLET | Refills: 1 | Status: SHIPPED | OUTPATIENT
Start: 2024-03-06

## 2024-03-06 NOTE — TELEPHONE ENCOUNTER
No care due was identified.  Health [facility] Embedded Care Due Messages. Reference number: [id number].   3/06/2024 7:19:17 AM CST

## 2024-03-06 NOTE — TELEPHONE ENCOUNTER
Refill Decision Note   Irineo Betts  is requesting a refill authorization.  Brief Assessment and Rationale for Refill:  Approve     Medication Therapy Plan:         Comments:     Note composed:7:51 AM 03/06/2024

## 2024-03-20 ENCOUNTER — HOSPITAL ENCOUNTER (EMERGENCY)
Facility: HOSPITAL | Age: 54
Discharge: HOME | End: 2024-03-20
Attending: EMERGENCY MEDICINE
Payer: COMMERCIAL

## 2024-03-20 VITALS
SYSTOLIC BLOOD PRESSURE: 133 MMHG | DIASTOLIC BLOOD PRESSURE: 71 MMHG | TEMPERATURE: 99 F | HEART RATE: 99 BPM | BODY MASS INDEX: 30.8 KG/M2 | OXYGEN SATURATION: 97 % | WEIGHT: 227.06 LBS | RESPIRATION RATE: 18 BRPM

## 2024-03-20 DIAGNOSIS — T14.8XXA SOFT TISSUE AVULSION: Primary | ICD-10-CM

## 2024-03-20 PROCEDURE — 25000003 PHARM REV CODE 250: Performed by: NURSE PRACTITIONER

## 2024-03-20 PROCEDURE — 99283 EMERGENCY DEPT VISIT LOW MDM: CPT

## 2024-03-20 RX ORDER — CEPHALEXIN 500 MG/1
500 CAPSULE ORAL 4 TIMES DAILY
Qty: 20 CAPSULE | Refills: 0 | Status: SHIPPED | OUTPATIENT
Start: 2024-03-20 | End: 2024-03-25

## 2024-03-20 RX ORDER — CEPHALEXIN 500 MG/1
500 CAPSULE ORAL 4 TIMES DAILY
Qty: 20 CAPSULE | Refills: 0 | Status: SHIPPED | OUTPATIENT
Start: 2024-03-20 | End: 2024-03-20

## 2024-03-20 RX ADMIN — BACITRACIN ZINC, NEOMYCIN, POLYMYXIN B 1 EACH: 400; 3.5; 5 OINTMENT TOPICAL at 10:03

## 2024-03-20 RX ADMIN — Medication: at 09:03

## 2024-03-20 NOTE — ED PROVIDER NOTES
Encounter Date: 3/20/2024       History     Chief Complaint   Patient presents with    Finger Injury     Pt states he was using a mandolin last night and accidentally cut his finger on left index. Noted bandage to left index.      54-year-old male with complaint of bleeding to left index finger after he accidentally cut the tip of his finger with a mandolin last night.  Patient reports the bleeding will not stop.  Patient denies pain.  Patient reports he cut his left thumb also but is not concerned about that injury.        Review of patient's allergies indicates:  No Known Allergies  Past Medical History:   Diagnosis Date    Allergy     Benign heart murmur     Corneal ulcer 01/08/2019    right eye    Hypertension     Nephrolithiasis      Past Surgical History:   Procedure Laterality Date    COLONOSCOPY N/A 4/25/2022    Procedure: COLONOSCOPY;  Surgeon: Edilma Fleming MD;  Location: [facility];  Service: Endoscopy;  Laterality: N/A;    ESOPHAGOGASTRODUODENOSCOPY N/A 11/15/2019    Procedure: EGD (ESOPHAGOGASTRODUODENOSCOPY);  Surgeon: Edilma Fleming MD;  Location: [facility];  Service: Endoscopy;  Laterality: N/A;    SINUS SURGERY      UNDESCENDED TESTICLE EXPLORATION       Family History   Problem Relation Age of Onset    Heart disease Father     Cancer Maternal Grandmother     Cancer Maternal Grandfather      Social History     Tobacco Use    Smoking status: Never     Passive exposure: Never    Smokeless tobacco: Never   Substance Use Topics    Alcohol use: Yes     Comment: occassinally      Drug use: No     Review of Systems   Constitutional:  Negative for fever.   HENT:  Negative for sore throat.    Respiratory:  Negative for shortness of breath.    Cardiovascular:  Negative for chest pain.   Gastrointestinal:  Negative for nausea.   Genitourinary:  Negative for dysuria.   Musculoskeletal:  Negative for back pain.   Skin:  Negative for rash.        Laceration    Neurological:  Negative for weakness.    Hematological:  Does not bruise/bleed easily.       Physical Exam     Initial Vitals [03/20/24 0943]   BP Pulse Resp Temp SpO2   133/71 99 18 98.5 °F (36.9 °C) 97 %      MAP       --         Physical Exam    Nursing note and vitals reviewed.  Constitutional: He appears well-developed and well-nourished.   HENT:   Head: Normocephalic and atraumatic.   Eyes: Conjunctivae are normal. Pupils are equal, round, and reactive to light.   Neck: Neck supple.   Normal range of motion.  Cardiovascular:  Normal rate, regular rhythm, normal heart sounds and intact distal pulses.           Pulmonary/Chest: Breath sounds normal.   Abdominal: Abdomen is soft. There is no rebound and no guarding.   Musculoskeletal:         General: Normal range of motion.      Cervical back: Normal range of motion and neck supple.     Neurological: He is alert.   Skin: Skin is warm and dry.   3-4 mm area of tissue avulsion and skin avulsion on the distal volar aspect of left index finger, mild superficial abrasion left thumb   Psychiatric: He has a normal mood and affect. His behavior is normal. Thought content normal.         ED Course   Procedures  Labs Reviewed - No data to display       Imaging Results    None          Medications   LETS (LIDOcaine-TETRAcaine-EPINEPHrine) gel solution ( Topical (Top) Given 3/20/24 0950)   neomycin-bacitracnZn-polymyxnB packet 1 each (1 each Topical (Top) Given 3/20/24 1029)     Medical Decision Making  Risk  OTC drugs.  Prescription drug management.       10:24 AM  Both wounds not bleeding at present.  Will place triple antibiotic ointment on both wounds and apply slight pressure dressing and have patient rechecked in 24 hours                               Clinical Impression:  Final diagnoses:  [T14.8XXA] Soft tissue avulsion (Primary)          ED Disposition Condition    Discharge Stable          ED Prescriptions       Medication Sig Dispense Start Date End Date Auth. Provider    cephALEXin (KEFLEX) 500 MG  capsule  (Status: Discontinued) Take 1 capsule (500 mg total) by mouth 4 (four) times daily. for 5 days 20 capsule 3/20/2024 3/20/2024 Miguel Young NP    cephALEXin (KEFLEX) 500 MG capsule Take 1 capsule (500 mg total) by mouth 4 (four) times daily. for 5 days 20 capsule 3/20/2024 3/25/2024 Miguel Young NP          Follow-up Information       Follow up With Specialties Details Why Contact Info    Emiliano Hernández MD Internal Medicine, Pediatrics Schedule an appointment as soon as possible for a visit  As needed [id number]  [place] [address]  [phone]               Miguel Young NP  03/20/24 1038

## 2024-03-28 ENCOUNTER — OFFICE VISIT (OUTPATIENT)
Dept: INTERNAL MEDICINE | Facility: CLINIC | Age: 54
End: 2024-03-28
Payer: COMMERCIAL

## 2024-03-28 VITALS
BODY MASS INDEX: 30.93 KG/M2 | HEART RATE: 67 BPM | TEMPERATURE: 97 F | HEIGHT: 72 IN | RESPIRATION RATE: 17 BRPM | WEIGHT: 228.38 LBS | OXYGEN SATURATION: 99 % | DIASTOLIC BLOOD PRESSURE: 70 MMHG | SYSTOLIC BLOOD PRESSURE: 126 MMHG

## 2024-03-28 DIAGNOSIS — S61.210D LACERATION OF RIGHT INDEX FINGER WITHOUT FOREIGN BODY WITHOUT DAMAGE TO NAIL, SUBSEQUENT ENCOUNTER: Primary | ICD-10-CM

## 2024-03-28 PROCEDURE — 99999 PR PBB SHADOW E&M-EST. PATIENT-LVL IV: CPT | Mod: PBBFAC,,, | Performed by: PEDIATRICS

## 2024-03-28 PROCEDURE — 3008F BODY MASS INDEX DOCD: CPT | Mod: CPTII,S$GLB,, | Performed by: PEDIATRICS

## 2024-03-28 PROCEDURE — 99212 OFFICE O/P EST SF 10 MIN: CPT | Mod: S$GLB,,, | Performed by: PEDIATRICS

## 2024-03-28 PROCEDURE — 3078F DIAST BP <80 MM HG: CPT | Mod: CPTII,S$GLB,, | Performed by: PEDIATRICS

## 2024-03-28 PROCEDURE — 3074F SYST BP LT 130 MM HG: CPT | Mod: CPTII,S$GLB,, | Performed by: PEDIATRICS

## 2024-03-28 PROCEDURE — 1160F RVW MEDS BY RX/DR IN RCRD: CPT | Mod: CPTII,S$GLB,, | Performed by: PEDIATRICS

## 2024-03-28 PROCEDURE — 1159F MED LIST DOCD IN RCRD: CPT | Mod: CPTII,S$GLB,, | Performed by: PEDIATRICS

## 2024-03-28 PROCEDURE — 4010F ACE/ARB THERAPY RXD/TAKEN: CPT | Mod: CPTII,S$GLB,, | Performed by: PEDIATRICS

## 2024-03-28 NOTE — PROGRESS NOTES
Subjective     Patient ID: Irineo Betts is a 54 y.o. male.    Chief Complaint: Follow-up    Irineo Betts is a 54 year old male here for follow up for a wound to his right index finger which onset 2 days ago. Pt was using a slicer and sliced off the top layer of skin of his finger. Pt was seen at the ED, prescribed keflex and advised to use antibiotic ointment and a pressure dressing. Wound appears to be healing well. Last tdap 10/23      Review of Systems   Constitutional:  Negative for chills and fever.   HENT:  Negative for nasal congestion and rhinorrhea.    Respiratory:  Negative for cough and shortness of breath.    Cardiovascular:  Negative for chest pain.   Gastrointestinal:  Negative for abdominal pain, blood in stool, change in bowel habit, constipation, diarrhea and nausea.   Endocrine: Negative for cold intolerance, heat intolerance, polydipsia, polyphagia and polyuria.   Genitourinary:  Negative for dysuria.   Integumentary:  Positive for wound.   Neurological:  Negative for weakness.          Objective     Physical Exam  Constitutional:       General: He is not in acute distress.     Appearance: Normal appearance. He is normal weight. He is not ill-appearing, toxic-appearing or diaphoretic.   Skin:     Comments: Superficial laceration, Healing by secondary intention right index fingertip without infection, photo taken   Neurological:      General: No focal deficit present.      Mental Status: He is alert and oriented to person, place, and time. Mental status is at baseline.      Cranial Nerves: No cranial nerve deficit.      Sensory: No sensory deficit.      Motor: No weakness.      Gait: Gait normal.   Psychiatric:         Mood and Affect: Mood normal.         Behavior: Behavior normal.         Thought Content: Thought content normal.         Judgment: Judgment normal.            Assessment and Plan     1. Laceration of right index finger without foreign body without damage to nail,  subsequent encounter        Local skin care. Follow up as needed.         No follow-ups on file.

## 2024-05-13 DIAGNOSIS — F51.04 PSYCHOPHYSIOLOGICAL INSOMNIA: ICD-10-CM

## 2024-05-13 DIAGNOSIS — F41.9 ANXIETY: ICD-10-CM

## 2024-05-13 RX ORDER — TRAZODONE HYDROCHLORIDE 100 MG/1
100 TABLET ORAL NIGHTLY
Qty: 90 TABLET | Refills: 3 | Status: SHIPPED | OUTPATIENT
Start: 2024-05-13

## 2024-05-13 NOTE — TELEPHONE ENCOUNTER
Refill Decision Note   Irineo Betts  is requesting a refill authorization.  Brief Assessment and Rationale for Refill:  Approve     Medication Therapy Plan:         Comments:     Note composed:5:04 PM 05/13/2024

## 2024-05-13 NOTE — TELEPHONE ENCOUNTER
No care due was identified.  Health [facility] Embedded Care Due Messages. Reference number: [id number].   5/13/2024 4:04:48 PM CDT

## 2024-05-21 DIAGNOSIS — I10 ESSENTIAL HYPERTENSION: ICD-10-CM

## 2024-05-21 RX ORDER — CHLORTHALIDONE 25 MG/1
TABLET ORAL
Qty: 90 TABLET | Refills: 1 | Status: SHIPPED | OUTPATIENT
Start: 2024-05-21

## 2024-05-21 NOTE — TELEPHONE ENCOUNTER
Refill Decision Note   Irineo Betts  is requesting a refill authorization.  Brief Assessment and Rationale for Refill:  Approve     Medication Therapy Plan:        Comments:     Note composed:11:08 AM 05/21/2024

## 2024-05-21 NOTE — TELEPHONE ENCOUNTER
No care due was identified.  Health [facility] Embedded Care Due Messages. Reference number: [id number].   5/21/2024 8:11:00 AM CDT

## 2024-06-18 ENCOUNTER — OFFICE VISIT (OUTPATIENT)
Dept: INTERNAL MEDICINE | Facility: CLINIC | Age: 54
End: 2024-06-18
Payer: COMMERCIAL

## 2024-06-18 DIAGNOSIS — E66.09 CLASS 1 OBESITY DUE TO EXCESS CALORIES WITH SERIOUS COMORBIDITY AND BODY MASS INDEX (BMI) OF 31.0 TO 31.9 IN ADULT: Primary | ICD-10-CM

## 2024-06-18 PROCEDURE — 99499 UNLISTED E&M SERVICE: CPT | Mod: 95,,,

## 2024-06-18 RX ORDER — SEMAGLUTIDE 0.5 MG/.5ML
0.5 INJECTION, SOLUTION SUBCUTANEOUS
Qty: 2 ML | Refills: 0 | Status: ACTIVE | OUTPATIENT
Start: 2024-07-18

## 2024-06-18 RX ORDER — SEMAGLUTIDE 0.25 MG/.5ML
0.25 INJECTION, SOLUTION SUBCUTANEOUS
Qty: 2 ML | Refills: 0 | Status: ACTIVE | OUTPATIENT
Start: 2024-06-18

## 2024-06-18 RX ORDER — SEMAGLUTIDE 1 MG/.5ML
1 INJECTION, SOLUTION SUBCUTANEOUS
Qty: 2 ML | Refills: 0 | Status: ACTIVE | OUTPATIENT
Start: 2024-08-18

## 2024-06-18 NOTE — PROGRESS NOTES
Patient ID: Irineo Betts is a 54 y.o. male    Subjective  Chief Complaint: patient presents for medical weight loss management.    Contraindications to GLP-1 receptor agonist therapy:   Denies personal or family history of MEN or MTC, history of allergic reaction while taking a GLP-1 receptor agonist, and history of pancreatitis while taking a GLP-1 receptor agonist     Co-morbidities: HTN, DLD      Weight loss history:    Current weight:    6/4/2024   Recent Readings    Weight (lbs) 230 lb    BMI 31.19 BMI        Objective  Lab Results   Component Value Date     09/28/2023     03/01/2023     02/28/2022     Lab Results   Component Value Date    K 3.8 09/28/2023    K 4.3 03/01/2023    K 3.8 02/28/2022     Lab Results   Component Value Date    CL 97 09/28/2023    CL 99 03/01/2023    CL 99 02/28/2022     Lab Results   Component Value Date    CO2 31 (H) 09/28/2023    CO2 32 (H) 03/01/2023    CO2 29 02/28/2022     Lab Results   Component Value Date    BUN 16 09/28/2023    BUN 17 03/01/2023    BUN 18 02/28/2022     Lab Results   Component Value Date    GLU 98 09/28/2023     03/01/2023    GLU 96 02/28/2022     Lab Results   Component Value Date    CALCIUM 10.1 09/28/2023    CALCIUM 10.2 03/01/2023    CALCIUM 9.9 02/28/2022     Lab Results   Component Value Date    PROT 7.7 09/28/2023    PROT 7.5 03/01/2023    PROT 7.6 02/28/2022     Lab Results   Component Value Date    ALBUMIN 4.4 09/28/2023    ALBUMIN 4.2 03/01/2023    ALBUMIN 4.3 02/28/2022     Lab Results   Component Value Date    BILITOT 1.4 (H) 09/28/2023    BILITOT 0.8 03/01/2023    BILITOT 1.2 (H) 02/28/2022     Lab Results   Component Value Date    AST 29 09/28/2023    AST 20 03/01/2023    AST 27 02/28/2022     Lab Results   Component Value Date    ALT 55 (H) 09/28/2023    ALT 42 03/01/2023    ALT 50 (H) 02/28/2022     Lab Results   Component Value Date    ANIONGAP 9 09/28/2023    ANIONGAP 7 (L) 03/01/2023    ANIONGAP 10 02/28/2022      Lab Results   Component Value Date    CREATININE 0.8 09/28/2023    CREATININE 0.9 03/01/2023    CREATININE 0.8 02/28/2022     Lab Results   Component Value Date    EGFRNORACEVR >60.0 09/28/2023    EGFRNORACEVR >60.0 03/01/2023         Assessment/Plan  -Pt qualifies for GLP-1 therapy based on BMI greater than or equal to 30 kg/m2  -Initiate Wegovy 0.25 mg once weekly for 1 month  -Then increase to Wegovy 0.5 mg once weekly for 1 month  -Then increase to Wegovy 1 mg once weekly  -RTC in 3 months     Patient consented to pharmacist management via collaborative practice.

## 2024-06-18 NOTE — PATIENT INSTRUCTIONS
WEGOVY® (wee-MARISEL-vee), (semaglutide) injection, for subcutaneous use  Read this Medication Guide and Instructions for Use before you start using WEGOVY and each time you get a refill. There may be new information. This information does not take the place of talking to your healthcare provider about your medical condition or your treatment.  What is the most important information I should know about WEGOVY?   WEGOVY may cause serious side effects, including:   Possible thyroid tumors, including cancer. Tell your healthcare provider if you get a lump or swelling in your neck, hoarseness, trouble swallowing, or shortness of breath. These may be symptoms of thyroid cancer. In studies with rodents, WEGOVY and medicines that work like WEGOVY caused thyroid tumors, including thyroid cancer. It is not known if WEGOVY will cause thyroid tumors or a type of thyroid cancer called medullary thyroid carcinoma (MTC) in people.   Do not use WEGOVY if you or any of your family have ever had a type of thyroid cancer called medullary thyroid carcinoma (MTC), or if you have an endocrine system condition called Multiple Endocrine Neoplasia syndrome type 2 (MEN 2).  What is WEGOVY?   WEGOVY is an injectable prescription medicine used with a reduced calorie diet and increased physical activity:   to reduce the risk of major cardiovascular events such as death, heart attack, or stroke in adults with known heart disease and with either obesity or overweight.   that may help adults and children aged 12 years and older with obesity, or some adults with overweight who also have weight-related medical problems, to help them lose excess body weight and keep the weight off.   WEGOVY contains semaglutide and should not be used with other semaglutide-containing products or other GLP-1 receptor agonist medicines.  It is not known if WEGOVY is safe and effective for use in children under 12 years of age.  Do not use WEGOVY if:   you or any of your  family have ever had a type of thyroid cancer called medullary thyroid carcinoma (MTC) or if you have an endocrine system condition called Multiple Endocrine Neoplasia syndrome type 2 (MEN 2).   you have had a serious allergic reaction to semaglutide or any of the ingredients in WEGOVY. See the end of this Medication Guide for a complete list of ingredients in WEGOVY. Symptoms of a serious allergic reaction include:   swelling of your face, lips, tongue or throat   fainting or feeling dizzy   problems breathing or swallowing   very rapid heartbeat   severe rash or itching  Before using WEGOVY, tell your healthcare provider if you have any other medical conditions, including if you:   have or have had problems with your pancreas or kidneys.   have type 2 diabetes and a history of diabetic retinopathy.   have or have had depression or suicidal thoughts, or mental health issues.   are pregnant or plan to become pregnant. WEGOVY may harm your unborn baby. You should stop using WEGOVY 2 months before you plan to become pregnant.   Pregnancy Exposure Registry: There is a pregnancy exposure registry for women who use WEGOVY during pregnancy. The purpose of this registry is to collect information about the health of you and your baby. Talk to your healthcare provider about how you can take part in this registry or you may contact [employer] at 1-506.464.9743.   are breastfeeding or plan to breastfeed. It is not known if WEGOVY passes into your breast milk. You should talk with your healthcare provider about the best way to feed your baby while using WEGOVY.   Tell your healthcare provider about all the medicines you take, including prescription and over-the-counter medicines, vitamins, and herbal supplements. WEGOVY may affect the way some medicines work and some medicines may affect the way WEGOVY works. Tell your healthcare provider if you are taking other medicines to treat diabetes, including sulfonylureas or insulin.  WEGOVY slows stomach emptying and can affect medicines that need to pass through the stomach quickly.   Know the medicines you take. Keep a list of them to show your healthcare provider and pharmacist when you get a new medicine.  How should I use WEGOVY?   Read the Instructions for Use that comes with WEGOVY.   Use WEGOVY exactly as your healthcare provider tells you to.   Your healthcare provider should show you how to use WEGOVY before you use it for the first time.   WEGOVY is injected under the skin (subcutaneously) of your stomach (abdomen), thigh, or upper arm. Do not inject WEGOVY into a muscle (intramuscularly) or vein (intravenously).   Change (rotate) your injection site with each injection. Do not use the same site for each injection.   Use WEGOVY 1 time each week, on the same day each week, at any time of the day.   Start WEGOVY with 0.25 mg per week in your first month. In your second month, increase your weekly dose to 0.5 mg. In the third month, increase your weekly dose to 1 mg. In the fourth month, increase your weekly dose to 1.7 mg. In the fifth month onwards, your healthcare provider may either maintain your dose at 1.7 mg weekly or increase your weekly dose to 2.4 mg.   If you need to change the day of the week, you may do so as long as your last dose of WEGOVY was given 2 or more days before.   If you miss a dose of WEGOVY and the next scheduled dose is more than 2 days away (48 hours), take the missed dose as soon as possible. If you miss a dose of WEGOVY and the next schedule dose is less than 2 days away (48 hours), do not administer the dose. Take your next dose on the regularly scheduled day.   If you miss doses of WEGOVY for more than 2 weeks, take your next dose on the regularly scheduled day or call your healthcare provider to talk about how to restart your treatment.   You can take WEGOVY with or without food.   If you take too much WEGOVY, you may have severe nausea, severe vomiting  and severe low blood sugar. Call your healthcare provider or go to the nearest hospital emergency room right away if you experience any of these symptoms.  What are the possible side effects of WEGOVY?   WEGOVY may cause serious side effects, including:   See What is the most important information I should know about WEGOVY?   inflammation of your pancreas (pancreatitis). Stop using WEGOVY and call your healthcare provider right away if you have severe pain in your stomach area (abdomen) that will not go away, with or without vomiting. You may feel the pain from your abdomen to your back.   gallbladder problems. WEGOVY may cause gallbladder problems including gallstones. Some gallbladder problems need surgery. Call your healthcare provider if you have any of the following symptoms:   pain in your upper stomach (abdomen)   yellowing of skin or eyes (jaundice)   fever   santosh-colored stools   increased risk of low blood sugar (hypoglycemia), especially those who also take medicines to treat diabetes mellitus such as insulin or sulfonylureas. Low blood sugar in patients with diabetes who receive WEGOVY can be a serious side effect. Talk to your healthcare provider about how to recognize and treat low blood sugar. You should check your blood sugar before you start taking WEGOVY and while you take WEGOVY. Signs and symptoms of low blood sugar may include:   dizziness or light-headedness   sweating   shakiness   blurred vision   slurred speech   weakness   anxiety   hunger   headache   irritability or mood changes   confusion or drowsiness   fast heartbeat   feeling jittery  kidney problems (kidney failure). In people who have kidney problems, diarrhea, nausea, and vomiting may cause a loss of fluids (dehydration) which may cause kidney problems to get worse. It is important for you to drink fluids to help reduce your chance of dehydration.   serious allergic reactions. Stop using WEGOVY and get medical help right away,  if you have any symptoms of a serious allergic reaction including:   swelling of your face, lips, tongue   severe rash or itching o very rapid heartbeat or throat   problems breathing or swallowing fainting or feeling dizzy   change in vision in people with type 2 diabetes. Tell your healthcare provider if you have changes in vision during treatment with WEGOVY.   increased heart rate. WEGOVY can increase your heart rate while you are at rest. Your healthcare provider should check your heart rate while you take WEGOVY. Tell your healthcare provider if you feel your heart racing or pounding in your chest and it lasts for several minutes.   depression or thoughts of suicide. You should pay attention to any mental changes, especially sudden changes in your mood, behaviors, thoughts, or feelings. Call your healthcare provider right away if you have any mental changes that are new, worse, or worry you.   The most common side effects of WEGOVY in adults or children aged 12 years and older may include:  nausea   stomach (abdomen) pain   dizziness   gas   diarrhea   headache   feeling bloated   stomach flu   vomiting   tiredness (fatigue)   belching   heartburn   constipation   upset stomach   low blood sugar in people   runny nose or sore throat with type 2 diabetes   Talk to your healthcare provider about any side effect that bothers you or does not go away. These are not all the possible side effects of WEGOVY. Call your doctor for medical advice about side effects. You may report side effects to FDA at 1-011-FDA-4470.  General information about the safe and effective use of WEGOVY.  Medicines are sometimes prescribed for purposes other than those listed in a Medication Guide. Do not use WEGOVY for a condition for which it was not prescribed. Do not give WEGOVY to other people, even if they have the same symptoms that you have. It may harm them. You can ask your pharmacist or healthcare provider for information about  WEGOVY that is written for health professionals.  What are the ingredients in WEGOVY?   Active Ingredient: semaglutide   Inactive Ingredients: disodium phosphate dihydrate, 1.42 mg; sodium chloride, 8.25 mg; water for injection; and hydrochloric acid or sodium hydroxide may be added to adjust pH.  For more information, go to [employer] or call 1-326-Zmbfhf-7.

## 2024-06-23 ENCOUNTER — PATIENT MESSAGE (OUTPATIENT)
Dept: INTERNAL MEDICINE | Facility: CLINIC | Age: 54
End: 2024-06-23
Payer: COMMERCIAL

## 2024-07-01 ENCOUNTER — LAB VISIT (OUTPATIENT)
Dept: LAB | Facility: HOSPITAL | Age: 54
End: 2024-07-01
Attending: PEDIATRICS
Payer: COMMERCIAL

## 2024-07-01 DIAGNOSIS — E78.00 PURE HYPERCHOLESTEROLEMIA: ICD-10-CM

## 2024-07-01 DIAGNOSIS — Z12.5 SCREENING FOR PROSTATE CANCER: ICD-10-CM

## 2024-07-01 DIAGNOSIS — J30.89 NON-SEASONAL ALLERGIC RHINITIS DUE TO OTHER ALLERGIC TRIGGER: ICD-10-CM

## 2024-07-01 LAB
ALBUMIN SERPL BCP-MCNC: 4.2 G/DL (ref 3.5–5.2)
ALP SERPL-CCNC: 76 U/L (ref 55–135)
ALT SERPL W/O P-5'-P-CCNC: 28 U/L (ref 10–44)
ANION GAP SERPL CALC-SCNC: 10 MMOL/L (ref 8–16)
AST SERPL-CCNC: 22 U/L (ref 10–40)
BILIRUB SERPL-MCNC: 0.6 MG/DL (ref 0.1–1)
BUN SERPL-MCNC: 12 MG/DL (ref 6–20)
CALCIUM SERPL-MCNC: 10 MG/DL (ref 8.7–10.5)
CHLORIDE SERPL-SCNC: 98 MMOL/L (ref 95–110)
CHOLEST SERPL-MCNC: 153 MG/DL (ref 120–199)
CHOLEST/HDLC SERPL: 3.6 {RATIO} (ref 2–5)
CO2 SERPL-SCNC: 26 MMOL/L (ref 23–29)
COMPLEXED PSA SERPL-MCNC: 0.7 NG/ML (ref 0–4)
CREAT SERPL-MCNC: 0.8 MG/DL (ref 0.5–1.4)
EST. GFR  (NO RACE VARIABLE): >60 ML/MIN/1.73 M^2
GLUCOSE SERPL-MCNC: 83 MG/DL (ref 70–110)
HDLC SERPL-MCNC: 42 MG/DL (ref 40–75)
HDLC SERPL: 27.5 % (ref 20–50)
LDLC SERPL CALC-MCNC: 92.8 MG/DL (ref 63–159)
NONHDLC SERPL-MCNC: 111 MG/DL
POTASSIUM SERPL-SCNC: 3.3 MMOL/L (ref 3.5–5.1)
PROT SERPL-MCNC: 7.7 G/DL (ref 6–8.4)
SODIUM SERPL-SCNC: 134 MMOL/L (ref 136–145)
TRIGL SERPL-MCNC: 91 MG/DL (ref 30–150)

## 2024-07-01 PROCEDURE — 36415 COLL VENOUS BLD VENIPUNCTURE: CPT | Mod: PO | Performed by: PEDIATRICS

## 2024-07-01 PROCEDURE — 84153 ASSAY OF PSA TOTAL: CPT | Performed by: PEDIATRICS

## 2024-07-01 PROCEDURE — 80061 LIPID PANEL: CPT | Performed by: PEDIATRICS

## 2024-07-01 PROCEDURE — 80053 COMPREHEN METABOLIC PANEL: CPT | Performed by: PEDIATRICS

## 2024-07-02 ENCOUNTER — LAB VISIT (OUTPATIENT)
Dept: LAB | Facility: HOSPITAL | Age: 54
End: 2024-07-02
Attending: PEDIATRICS
Payer: COMMERCIAL

## 2024-07-02 ENCOUNTER — OFFICE VISIT (OUTPATIENT)
Dept: INTERNAL MEDICINE | Facility: CLINIC | Age: 54
End: 2024-07-02
Payer: COMMERCIAL

## 2024-07-02 VITALS
RESPIRATION RATE: 17 BRPM | WEIGHT: 225.5 LBS | OXYGEN SATURATION: 99 % | HEIGHT: 72 IN | SYSTOLIC BLOOD PRESSURE: 124 MMHG | BODY MASS INDEX: 30.54 KG/M2 | DIASTOLIC BLOOD PRESSURE: 72 MMHG | TEMPERATURE: 96 F | HEART RATE: 74 BPM

## 2024-07-02 DIAGNOSIS — E66.09 CLASS 1 OBESITY DUE TO EXCESS CALORIES WITH SERIOUS COMORBIDITY AND BODY MASS INDEX (BMI) OF 31.0 TO 31.9 IN ADULT: ICD-10-CM

## 2024-07-02 DIAGNOSIS — E87.1 HYPONATREMIA: ICD-10-CM

## 2024-07-02 DIAGNOSIS — Z00.00 WELL ADULT EXAM: Primary | ICD-10-CM

## 2024-07-02 DIAGNOSIS — K21.00 GASTROESOPHAGEAL REFLUX DISEASE WITH ESOPHAGITIS WITHOUT HEMORRHAGE: ICD-10-CM

## 2024-07-02 DIAGNOSIS — E78.00 PURE HYPERCHOLESTEROLEMIA: ICD-10-CM

## 2024-07-02 DIAGNOSIS — J33.9 NASAL POLYPOSIS: ICD-10-CM

## 2024-07-02 DIAGNOSIS — I10 ESSENTIAL HYPERTENSION: ICD-10-CM

## 2024-07-02 DIAGNOSIS — F41.9 ANXIETY: ICD-10-CM

## 2024-07-02 DIAGNOSIS — J32.4 CHRONIC PANSINUSITIS: ICD-10-CM

## 2024-07-02 DIAGNOSIS — E87.6 HYPOKALEMIA: ICD-10-CM

## 2024-07-02 LAB
ALBUMIN SERPL BCP-MCNC: 4.1 G/DL (ref 3.5–5.2)
ALP SERPL-CCNC: 72 U/L (ref 55–135)
ALT SERPL W/O P-5'-P-CCNC: 28 U/L (ref 10–44)
ANION GAP SERPL CALC-SCNC: 11 MMOL/L (ref 8–16)
AST SERPL-CCNC: 20 U/L (ref 10–40)
BILIRUB SERPL-MCNC: 0.8 MG/DL (ref 0.1–1)
BUN SERPL-MCNC: 11 MG/DL (ref 6–20)
CALCIUM SERPL-MCNC: 10.2 MG/DL (ref 8.7–10.5)
CHLORIDE SERPL-SCNC: 98 MMOL/L (ref 95–110)
CO2 SERPL-SCNC: 28 MMOL/L (ref 23–29)
CREAT SERPL-MCNC: 0.8 MG/DL (ref 0.5–1.4)
EST. GFR  (NO RACE VARIABLE): >60 ML/MIN/1.73 M^2
GLUCOSE SERPL-MCNC: 93 MG/DL (ref 70–110)
POTASSIUM SERPL-SCNC: 4 MMOL/L (ref 3.5–5.1)
PROT SERPL-MCNC: 7.7 G/DL (ref 6–8.4)
SODIUM SERPL-SCNC: 137 MMOL/L (ref 136–145)

## 2024-07-02 PROCEDURE — 80053 COMPREHEN METABOLIC PANEL: CPT | Performed by: PEDIATRICS

## 2024-07-02 PROCEDURE — 36415 COLL VENOUS BLD VENIPUNCTURE: CPT | Performed by: PEDIATRICS

## 2024-07-02 PROCEDURE — 99999 PR PBB SHADOW E&M-EST. PATIENT-LVL V: CPT | Mod: PBBFAC,,, | Performed by: PEDIATRICS

## 2024-07-02 RX ORDER — FLUTICASONE PROPIONATE 50 MCG
2 SPRAY, SUSPENSION (ML) NASAL 2 TIMES DAILY
Qty: 16 G | Refills: 11 | Status: SHIPPED | OUTPATIENT
Start: 2024-07-02

## 2024-07-02 NOTE — PROGRESS NOTES
"Patient ID: Irineo Betts is a 54 y.o. male.    Chief Complaint: Follow-up    History of Present Illness    CHIEF COMPLAINT:  Patient presents today for yearly follow up.    HYPERTENSION:  He takes telmisartan 80 mg and chlorthalidone 25 mg with well-controlled blood pressure on this regimen. In dig HTN    HYPERLIPIDEMIA:  He takes rosuvastatin 10 mg daily. Recent cholesterol panel shows total cholesterol 153 mg/dL with LDL and HDL at goal. He denies any statin side effects. In Dig lipid    GERD:  Pantoprazole is working well for acid reflux symptoms. He denies chest pain, shortness of breath, or bowel/bladder problems related to acid reflux.    ALLERGIES:  He has nasal polyps and chronic sinusitis managed with Flonase and montelukast which help control symptoms. He denies other allergy complaints.    ANXIETY:  Sertraline and trazodone are used for anxiety management. Trazodone helps him "sleep like a baby" without any issues or concerns.    WEIGHT LOSS/obesity:  He started Wegovy for weight loss, prescribed by Dr. Alli beach, at 0.25 mg with plans to increase to 0.5 mg and 1 mg doses. His current weight is 225 lbs with a low of 220 lbs since starting. Both his and his partner's appetites have decreased on the medication. He acknowledges the need to learn proper eating habits and portion control for sustained loss after stopping Wegovy.    LABS:  Recent prostate cancer screening labs look good without concerning symptoms. Mild hyponatremia (134) and hypokalemia (3.3) are noted, possibly chlorthalidone-related, but without symptoms of electrolyte imbalance. Glucose, kidney and liver tests look good without related symptoms.    PMH, PSH, SH, FH reviewed with patient.    ROS:  General: -fever, -chills, -fatigue, -weight gain, -weight loss  Eyes: -vision changes, -redness, -discharge  ENT: -ear pain, -nasal congestion, -sore throat  Cardiovascular: -chest pain, -palpitations, -lower extremity " edema  Respiratory: -cough, -shortness of breath  Gastrointestinal: -abdominal pain, -nausea, -vomiting, -diarrhea, -constipation, -blood in stool  Genitourinary: -dysuria, -hematuria, -frequency  Musculoskeletal: -joint pain, -muscle pain  Skin: -rash, -lesion  Neurological: -headache, -dizziness, -numbness, -tingling  Psychiatric: +anxiety, -depression, -sleep difficulty         Exam:  Physical Exam    General: No acute distress. Well-developed. Well-nourished.  Eyes: EOMI. Sclerae anicteric.  HENT: Normocephalic. Atraumatic. Nares patent. Moist oral mucosa.  Cardiovascular: Regular rate. Regular rhythm. No murmurs. No rubs. No gallops. Normal S1, S2.  Respiratory: Normal respiratory effort. Clear to auscultation bilaterally. No rales. No rhonchi. No wheezing.  Abdomen: Soft. Non-tender. Non-distended. Normoactive bowel sounds.  Musculoskeletal: No  obvious deformity.  Extremities: No lower extremity edema.  Neurological: Alert & oriented x3. No slurred speech. Normal gait.  Psychiatric: Normal mood. Normal affect. Good insight. Good judgment.  Skin: Warm. Dry. No rash.         Assessment/Plan:  Well adult exam    Class 1 obesity due to excess calories with serious comorbidity and body mass index (BMI) of 31.0 to 31.9 in adult    Pure hypercholesterolemia  -     Comprehensive Metabolic Panel; Future; Expected date: 07/02/2024  -     Lipid Panel; Future; Expected date: 07/02/2024    Essential hypertension    Gastroesophageal reflux disease with esophagitis without hemorrhage    Anxiety    Nasal polyposis    Chronic pansinusitis    Hyponatremia  -     Basic Metabolic Panel; Future; Expected date: 07/02/2024    Hypokalemia  -     Basic Metabolic Panel; Future; Expected date: 07/02/2024         Assessment & Plan    E66.9 Obesity, unspecified  K21.9 Gastro-esophageal reflux disease without esophagitis  F41.9 Anxiety disorder, unspecified  J32.9 Chronic sinusitis, unspecified  J33.9 Nasal polyp, unspecified  E78.5  Hyperlipidemia, unspecified  I10 Essential (primary) hypertension  HYPERTENSION:  - Blood pressure well-controlled on current regimen of telmisartan 80 mg and chlorthalidone 25 mg.  - No changes made.  - Continued telmisartan 80 mg for blood pressure, chlorthalidone 25 mg for blood pressure.  HYPERLIPIDEMIA:  - Cholesterol results improved on rosuvastatin 10 mg.  - Total cholesterol down to 153, ratio improving, LDL at lowest level.  - Continued rosuvastatin 10 mg for cholesterol.  ELECTROLYTE IMBALANCE:  - Mild hyponatremia (Na 134) and hypokalemia (K 3.3) noted, likely related to chlorthalidone.  - Basic metabolic panel ordered today to recheck sodium and potassium levels.  - Potassium will be supplemented if needed.  - Patient will be contacted with basic metabolic panel results tomorrow and advised if any changes are needed.  WEIGHT MANAGEMENT:  - Liver enzymes normalized with recent weight loss.  - Patient recently started Wegovy for weight loss under the care of Dr. Carson  - Advised that while using Wegovy for weight loss, it is important to learn proper eating habits and portion control, as weight can increase again if these skills are not developed before discontinuing the medication.  - Started Wegovy for weight loss as prescribed by Dr. Fry, currently taking 0.25 mg with plans to increase to 0.5 mg and 1 mg.  SIDE EFFECTS OF WEGOVY:  - Discussed common side effects of Wegovy including constipation (mitigate with increased fiber and hydration), upset stomach, bloating, gas, and potential worsening of acid reflux.  OTHER MEDICATIONS:  - Continued pantoprazole for acid reflux, Flonase and montelukast for allergies, nasal polyps, and chronic sinusitis, sertraline and trazodone for anxiety.  VACCINATION:  - Shingles vaccine ordered, to be administered at pharmacy.  FOLLOW UP:  - Follow up in 6 months with Dr. Hernández or nurse practitioner Miss Olivas.          Visit today included increased complexity  associated with the care of the episodic problem  addressed and managing the longitudinal care of the patient due to the serious and/or complex managed problem(s) .      Follow up in about 6 months (around 1/2/2025).    This note was generated with the assistance of ambient listening technology. Verbal consent was obtained by the patient and accompanying visitor(s) for the recording of patient appointment to facilitate this note. I attest to having reviewed and edited the generated note for accuracy, though some syntax or spelling errors may persist. Please contact the author of this note for any clarification.

## 2024-08-06 ENCOUNTER — OFFICE VISIT (OUTPATIENT)
Dept: OPHTHALMOLOGY | Facility: CLINIC | Age: 54
End: 2024-08-06
Payer: COMMERCIAL

## 2024-08-06 DIAGNOSIS — H53.8 BLURRED VISION, BILATERAL: Primary | ICD-10-CM

## 2024-08-06 DIAGNOSIS — H52.13 MYOPIA WITH ASTIGMATISM AND PRESBYOPIA, BILATERAL: ICD-10-CM

## 2024-08-06 DIAGNOSIS — H52.4 MYOPIA WITH ASTIGMATISM AND PRESBYOPIA, BILATERAL: ICD-10-CM

## 2024-08-06 DIAGNOSIS — H52.203 MYOPIA WITH ASTIGMATISM AND PRESBYOPIA, BILATERAL: ICD-10-CM

## 2024-08-06 PROCEDURE — 4010F ACE/ARB THERAPY RXD/TAKEN: CPT | Mod: CPTII,S$GLB,, | Performed by: OPTOMETRIST

## 2024-08-06 PROCEDURE — 92014 COMPRE OPH EXAM EST PT 1/>: CPT | Mod: S$GLB,,, | Performed by: OPTOMETRIST

## 2024-08-06 PROCEDURE — 99999 PR PBB SHADOW E&M-EST. PATIENT-LVL III: CPT | Mod: PBBFAC,,, | Performed by: OPTOMETRIST

## 2024-08-06 PROCEDURE — 1159F MED LIST DOCD IN RCRD: CPT | Mod: CPTII,S$GLB,, | Performed by: OPTOMETRIST

## 2024-08-06 PROCEDURE — 92015 DETERMINE REFRACTIVE STATE: CPT | Mod: S$GLB,,, | Performed by: OPTOMETRIST

## 2024-08-22 NOTE — PROGRESS NOTES
Date of treatment initiation: 04/09/2015  Initial SNOT-20 score: 28  Date of last followup visit with referring physician: 06/10/2016  Date next followup visit is due:06/2017  Most recent SNOT-20 score:  0    No Known Drug Allergies    Ordering Physician: Dr. Kurtz      MAINTENANCE VIALS - MANUFACTURED BY [facility]    Mix #1 - LOT# [id number]  Allergens: trees  Mix #2 - LOT# [id number]  Allergens: weeds and grasses  Mix #3 - LOT# [id number]  Allergens: molds, mites, cockroach, jason, dog, feathers      Administered 0.05 mL of red therapeutic vial -  mix #1 and #2 in left arm & mix #3 in right arm subcutaneously at 1115 manufactured by [employer]. Patient reports that he will wait in the building for 20 minutes after injection, he states that he will contact our office with any questions, concerns, or signs of a reaction.                    
.

## 2024-08-28 RX ORDER — PANTOPRAZOLE SODIUM 40 MG/1
40 TABLET, DELAYED RELEASE ORAL DAILY
Qty: 30 TABLET | Refills: 6 | Status: SHIPPED | OUTPATIENT
Start: 2024-08-28 | End: 2025-08-28

## 2024-08-30 DIAGNOSIS — E78.00 PURE HYPERCHOLESTEROLEMIA: ICD-10-CM

## 2024-08-30 RX ORDER — ROSUVASTATIN CALCIUM 10 MG/1
10 TABLET, COATED ORAL DAILY
Qty: 90 TABLET | Refills: 3 | Status: SHIPPED | OUTPATIENT
Start: 2024-08-30

## 2024-08-30 NOTE — TELEPHONE ENCOUNTER
No care due was identified.  [facility] Embedded Care Due Messages. Reference number: [id number].   8/30/2024 5:08:26 AM CDT

## 2024-08-30 NOTE — TELEPHONE ENCOUNTER
Refill Decision Note   Irineo Betts  is requesting a refill authorization.  Brief Assessment and Rationale for Refill:  Approve     Medication Therapy Plan:         Comments:     Note composed:12:57 PM 08/30/2024

## 2024-09-04 DIAGNOSIS — E66.09 CLASS 1 OBESITY DUE TO EXCESS CALORIES WITH SERIOUS COMORBIDITY AND BODY MASS INDEX (BMI) OF 31.0 TO 31.9 IN ADULT: ICD-10-CM

## 2024-09-04 PROCEDURE — 99499 UNLISTED E&M SERVICE: CPT | Mod: S$GLB,,, | Performed by: STUDENT IN AN ORGANIZED HEALTH CARE EDUCATION/TRAINING PROGRAM

## 2024-09-04 RX ORDER — SEMAGLUTIDE 1.7 MG/.75ML
1.7 INJECTION, SOLUTION SUBCUTANEOUS
Qty: 3 ML | Refills: 0 | Status: ACTIVE | OUTPATIENT
Start: 2024-09-04

## 2024-09-04 RX ORDER — SEMAGLUTIDE 1 MG/.5ML
1 INJECTION, SOLUTION SUBCUTANEOUS
Qty: 2 ML | Refills: 0 | OUTPATIENT
Start: 2024-09-04

## 2024-09-18 ENCOUNTER — PATIENT MESSAGE (OUTPATIENT)
Dept: INTERNAL MEDICINE | Facility: CLINIC | Age: 54
End: 2024-09-18

## 2024-09-18 ENCOUNTER — OFFICE VISIT (OUTPATIENT)
Dept: INTERNAL MEDICINE | Facility: CLINIC | Age: 54
End: 2024-09-18
Payer: COMMERCIAL

## 2024-09-18 DIAGNOSIS — E66.9 OBESITY, UNSPECIFIED CLASSIFICATION, UNSPECIFIED OBESITY TYPE, UNSPECIFIED WHETHER SERIOUS COMORBIDITY PRESENT: Primary | ICD-10-CM

## 2024-09-18 PROCEDURE — 99499 UNLISTED E&M SERVICE: CPT | Mod: 95,,,

## 2024-09-18 RX ORDER — SEMAGLUTIDE 2.4 MG/.75ML
2.4 INJECTION, SOLUTION SUBCUTANEOUS
Qty: 3 ML | Refills: 2 | Status: ACTIVE | OUTPATIENT
Start: 2024-09-18

## 2024-09-18 NOTE — PROGRESS NOTES
Patient ID: Irineo Betts is a 54 y.o. male    Subjective  Chief Complaint: patient presents for medical weight loss management.    Co-morbidities: HTN, DLD    HPI: Patient [facility] with [facility] in June 2024 and is currently managed on Wegovy 1.7 mg. Pt took first dose last week.     Tolerance to current therapy:  Denies nausea, vomiting, diarrhea, constipation, abdominal pain    Weight loss history:  Starting weight:    6/4/2024   Recent Readings    Weight (lbs) 230 lb    BMI 31.19 BMI    Current weight:    9/17/2024   Recent Readings    Weight (lbs) 208 lb    BMI 28.21 BMI    % weight loss since GLP-1 initiation: 9.6%    Objective  Lab Results   Component Value Date     07/02/2024     (L) 07/01/2024     09/28/2023     Lab Results   Component Value Date    K 4.0 07/02/2024    K 3.3 (L) 07/01/2024    K 3.8 09/28/2023     Lab Results   Component Value Date    CL 98 07/02/2024    CL 98 07/01/2024    CL 97 09/28/2023     Lab Results   Component Value Date    CO2 28 07/02/2024    CO2 26 07/01/2024    CO2 31 (H) 09/28/2023     Lab Results   Component Value Date    BUN 11 07/02/2024    BUN 12 07/01/2024    BUN 16 09/28/2023     Lab Results   Component Value Date    GLU 93 07/02/2024    GLU 83 07/01/2024    GLU 98 09/28/2023     Lab Results   Component Value Date    CALCIUM 10.2 07/02/2024    CALCIUM 10.0 07/01/2024    CALCIUM 10.1 09/28/2023     Lab Results   Component Value Date    PROT 7.7 07/02/2024    PROT 7.7 07/01/2024    PROT 7.7 09/28/2023     Lab Results   Component Value Date    ALBUMIN 4.1 07/02/2024    ALBUMIN 4.2 07/01/2024    ALBUMIN 4.4 09/28/2023     Lab Results   Component Value Date    BILITOT 0.8 07/02/2024    BILITOT 0.6 07/01/2024    BILITOT 1.4 (H) 09/28/2023     Lab Results   Component Value Date    AST 20 07/02/2024    AST 22 07/01/2024    AST 29 09/28/2023     Lab Results   Component Value Date    ALT 28 07/02/2024    ALT 28 07/01/2024    ALT 55 (H)  09/28/2023     Lab Results   Component Value Date    ANIONGAP 11 07/02/2024    ANIONGAP 10 07/01/2024    ANIONGAP 9 09/28/2023     Lab Results   Component Value Date    CREATININE 0.8 07/02/2024    CREATININE 0.8 07/01/2024    CREATININE 0.8 09/28/2023     Lab Results   Component Value Date    EGFRNORACEVR >60.0 07/02/2024    EGFRNORACEVR >60.0 07/01/2024    EGFRNORACEVR >60.0 09/28/2023     Assessment/Plan  - Increase to Wegovy 2.4 mg SQ weekly once completed with Wegovy 1.7 mg  - RTC in 3 months for follow-up evaluation    Patient consented to pharmacist management via collaborative practice.

## 2024-09-30 ENCOUNTER — PATIENT MESSAGE (OUTPATIENT)
Dept: ADMINISTRATIVE | Facility: OTHER | Age: 54
End: 2024-09-30
Payer: COMMERCIAL

## 2024-10-20 DIAGNOSIS — J32.4 CHRONIC PANSINUSITIS: ICD-10-CM

## 2024-10-20 DIAGNOSIS — J33.9 NASAL POLYPOSIS: ICD-10-CM

## 2024-10-21 RX ORDER — MONTELUKAST SODIUM 10 MG/1
10 TABLET ORAL NIGHTLY
Qty: 30 TABLET | Refills: 12 | Status: SHIPPED | OUTPATIENT
Start: 2024-10-21

## 2024-10-29 DIAGNOSIS — F41.9 ANXIETY: ICD-10-CM

## 2024-10-29 RX ORDER — SERTRALINE HYDROCHLORIDE 100 MG/1
100 TABLET, FILM COATED ORAL DAILY
Qty: 90 TABLET | Refills: 2 | Status: SHIPPED | OUTPATIENT
Start: 2024-10-29 | End: 2025-07-26

## 2024-11-18 DIAGNOSIS — I10 ESSENTIAL HYPERTENSION: ICD-10-CM

## 2024-11-18 RX ORDER — CHLORTHALIDONE 25 MG/1
TABLET ORAL
Qty: 90 TABLET | Refills: 2 | Status: SHIPPED | OUTPATIENT
Start: 2024-11-18

## 2024-11-18 NOTE — TELEPHONE ENCOUNTER
No care due was identified.  [facility] Embedded Care Due Messages. Reference number: [id number].   11/18/2024 5:08:25 AM CST

## 2024-11-18 NOTE — TELEPHONE ENCOUNTER
Refill Decision Note   Irineo Betts  is requesting a refill authorization.  Brief Assessment and Rationale for Refill:  Approve     Medication Therapy Plan:         Comments:     Note composed:6:27 AM 11/18/2024

## 2024-11-29 ENCOUNTER — PATIENT MESSAGE (OUTPATIENT)
Dept: ADMINISTRATIVE | Facility: OTHER | Age: 54
End: 2024-11-29
Payer: COMMERCIAL

## 2024-12-27 DIAGNOSIS — E66.9 OBESITY, UNSPECIFIED CLASSIFICATION, UNSPECIFIED OBESITY TYPE, UNSPECIFIED WHETHER SERIOUS COMORBIDITY PRESENT: ICD-10-CM

## 2024-12-30 RX ORDER — SEMAGLUTIDE 2.4 MG/.75ML
2.4 INJECTION, SOLUTION SUBCUTANEOUS
Qty: 3 ML | Refills: 0 | Status: ACTIVE | OUTPATIENT
Start: 2024-12-30

## 2025-01-13 ENCOUNTER — IMMUNIZATION (OUTPATIENT)
Dept: INTERNAL MEDICINE | Facility: CLINIC | Age: 55
End: 2025-01-13
Payer: COMMERCIAL

## 2025-01-13 DIAGNOSIS — Z23 NEED FOR VACCINATION: Primary | ICD-10-CM

## 2025-01-13 PROCEDURE — 90471 IMMUNIZATION ADMIN: CPT | Mod: S$GLB,,, | Performed by: FAMILY MEDICINE

## 2025-01-13 PROCEDURE — 90656 IIV3 VACC NO PRSV 0.5 ML IM: CPT | Mod: S$GLB,,, | Performed by: FAMILY MEDICINE

## 2025-01-24 DIAGNOSIS — E66.9 OBESITY, UNSPECIFIED CLASSIFICATION, UNSPECIFIED OBESITY TYPE, UNSPECIFIED WHETHER SERIOUS COMORBIDITY PRESENT: ICD-10-CM

## 2025-01-24 RX ORDER — SEMAGLUTIDE 2.4 MG/.75ML
2.4 INJECTION, SOLUTION SUBCUTANEOUS
Qty: 3 ML | Refills: 0 | OUTPATIENT
Start: 2025-01-24

## 2025-01-27 DIAGNOSIS — E66.811 CLASS 1 OBESITY DUE TO EXCESS CALORIES WITH SERIOUS COMORBIDITY AND BODY MASS INDEX (BMI) OF 31.0 TO 31.9 IN ADULT: Primary | ICD-10-CM

## 2025-01-27 DIAGNOSIS — E66.9 OBESITY, UNSPECIFIED CLASSIFICATION, UNSPECIFIED OBESITY TYPE, UNSPECIFIED WHETHER SERIOUS COMORBIDITY PRESENT: ICD-10-CM

## 2025-01-27 DIAGNOSIS — E66.09 CLASS 1 OBESITY DUE TO EXCESS CALORIES WITH SERIOUS COMORBIDITY AND BODY MASS INDEX (BMI) OF 31.0 TO 31.9 IN ADULT: Primary | ICD-10-CM

## 2025-01-27 RX ORDER — SEMAGLUTIDE 2.4 MG/.75ML
2.4 INJECTION, SOLUTION SUBCUTANEOUS
Qty: 3 ML | Refills: 0 | OUTPATIENT
Start: 2025-01-27

## 2025-01-30 ENCOUNTER — PATIENT MESSAGE (OUTPATIENT)
Dept: INTERNAL MEDICINE | Facility: CLINIC | Age: 55
End: 2025-01-30
Payer: COMMERCIAL

## 2025-01-30 ENCOUNTER — PATIENT MESSAGE (OUTPATIENT)
Dept: ADMINISTRATIVE | Facility: OTHER | Age: 55
End: 2025-01-30
Payer: COMMERCIAL

## 2025-02-03 RX ORDER — SEMAGLUTIDE 2.4 MG/.75ML
2.4 INJECTION, SOLUTION SUBCUTANEOUS
Qty: 3 ML | Refills: 3 | Status: ACTIVE | OUTPATIENT
Start: 2025-02-03

## 2025-02-03 NOTE — TELEPHONE ENCOUNTER
Pt has now schedule WM follow-up. Refills provided to hold over patient until follow-up visit can be completed.

## 2025-03-03 ENCOUNTER — PATIENT MESSAGE (OUTPATIENT)
Dept: ADMINISTRATIVE | Facility: OTHER | Age: 55
End: 2025-03-03
Payer: COMMERCIAL

## 2025-03-10 ENCOUNTER — PATIENT MESSAGE (OUTPATIENT)
Dept: INTERNAL MEDICINE | Facility: CLINIC | Age: 55
End: 2025-03-10

## 2025-03-10 ENCOUNTER — OFFICE VISIT (OUTPATIENT)
Dept: INTERNAL MEDICINE | Facility: CLINIC | Age: 55
End: 2025-03-10
Payer: COMMERCIAL

## 2025-03-10 DIAGNOSIS — E66.811 CLASS 1 OBESITY DUE TO EXCESS CALORIES WITH SERIOUS COMORBIDITY AND BODY MASS INDEX (BMI) OF 31.0 TO 31.9 IN ADULT: Primary | ICD-10-CM

## 2025-03-10 DIAGNOSIS — E66.09 CLASS 1 OBESITY DUE TO EXCESS CALORIES WITH SERIOUS COMORBIDITY AND BODY MASS INDEX (BMI) OF 31.0 TO 31.9 IN ADULT: Primary | ICD-10-CM

## 2025-03-10 PROCEDURE — 99499 UNLISTED E&M SERVICE: CPT | Mod: 95,,,

## 2025-03-10 RX ORDER — SEMAGLUTIDE 2.4 MG/.75ML
2.4 INJECTION, SOLUTION SUBCUTANEOUS
Qty: 3 ML | Refills: 5 | Status: ACTIVE | OUTPATIENT
Start: 2025-03-10

## 2025-03-10 NOTE — PROGRESS NOTES
Patient ID: Irineo Betts is a 55 y.o. male    Subjective  Chief Complaint: patient presents for medical weight loss management.    Co-morbidities: HTN, DLD    HPI: Patient started [facility] with [facility] in June 2024 and is currently managed on Wegovy 2.4 mg.     Tolerance to current therapy:  Denies nausea, vomiting, diarrhea, constipation, abdominal pain    Weight loss history:  Starting weight:    6/4/2024   Recent Readings    Weight (lbs) 230 lb    BMI 31.19 BMI      Current weight:    2/11/2025   Recent Readings    Weight (lbs) 197 lb    BMI 26.72 BMI      % weight loss since GLP-1 initiation: 14.3%    Objective  Lab Results   Component Value Date     07/02/2024     (L) 07/01/2024     09/28/2023     Lab Results   Component Value Date    K 4.0 07/02/2024    K 3.3 (L) 07/01/2024    K 3.8 09/28/2023     Lab Results   Component Value Date    CL 98 07/02/2024    CL 98 07/01/2024    CL 97 09/28/2023     Lab Results   Component Value Date    CO2 28 07/02/2024    CO2 26 07/01/2024    CO2 31 (H) 09/28/2023     Lab Results   Component Value Date    BUN 11 07/02/2024    BUN 12 07/01/2024    BUN 16 09/28/2023     Lab Results   Component Value Date    GLU 93 07/02/2024    GLU 83 07/01/2024    GLU 98 09/28/2023     Lab Results   Component Value Date    CALCIUM 10.2 07/02/2024    CALCIUM 10.0 07/01/2024    CALCIUM 10.1 09/28/2023     Lab Results   Component Value Date    PROT 7.7 07/02/2024    PROT 7.7 07/01/2024    PROT 7.7 09/28/2023     Lab Results   Component Value Date    ALBUMIN 4.1 07/02/2024    ALBUMIN 4.2 07/01/2024    ALBUMIN 4.4 09/28/2023     Lab Results   Component Value Date    BILITOT 0.8 07/02/2024    BILITOT 0.6 07/01/2024    BILITOT 1.4 (H) 09/28/2023     Lab Results   Component Value Date    AST 20 07/02/2024    AST 22 07/01/2024    AST 29 09/28/2023     Lab Results   Component Value Date    ALT 28 07/02/2024    ALT 28 07/01/2024    ALT 55 (H) 09/28/2023     Lab  Results   Component Value Date    ANIONGAP 11 07/02/2024    ANIONGAP 10 07/01/2024    ANIONGAP 9 09/28/2023     Lab Results   Component Value Date    CREATININE 0.8 07/02/2024    CREATININE 0.8 07/01/2024    CREATININE 0.8 09/28/2023     Lab Results   Component Value Date    EGFRNORACEVR >60.0 07/02/2024    EGFRNORACEVR >60.0 07/01/2024    EGFRNORACEVR >60.0 09/28/2023     Assessment/Plan  - Continue Wegovy 2.4 mg SQ weekly  - RTC in 6 months for follow-up evaluation    Patient consented to pharmacist management via collaborative practice.

## 2025-04-30 RX ORDER — PANTOPRAZOLE SODIUM 40 MG/1
40 TABLET, DELAYED RELEASE ORAL DAILY
Qty: 30 TABLET | Refills: 6 | OUTPATIENT
Start: 2025-04-30 | End: 2026-04-30

## 2025-05-01 ENCOUNTER — OFFICE VISIT (OUTPATIENT)
Dept: OPHTHALMOLOGY | Facility: CLINIC | Age: 55
End: 2025-05-01
Payer: COMMERCIAL

## 2025-05-01 DIAGNOSIS — H52.203 MYOPIA WITH ASTIGMATISM AND PRESBYOPIA, BILATERAL: Primary | ICD-10-CM

## 2025-05-01 DIAGNOSIS — H40.013 OAG (OPEN ANGLE GLAUCOMA) SUSPECT, LOW RISK, BILATERAL: ICD-10-CM

## 2025-05-01 DIAGNOSIS — H52.13 MYOPIA WITH ASTIGMATISM AND PRESBYOPIA, BILATERAL: Primary | ICD-10-CM

## 2025-05-01 DIAGNOSIS — H52.4 MYOPIA WITH ASTIGMATISM AND PRESBYOPIA, BILATERAL: Primary | ICD-10-CM

## 2025-05-01 PROCEDURE — 92015 DETERMINE REFRACTIVE STATE: CPT | Mod: S$GLB,,, | Performed by: OPTOMETRIST

## 2025-05-01 PROCEDURE — 92014 COMPRE OPH EXAM EST PT 1/>: CPT | Mod: S$GLB,,, | Performed by: OPTOMETRIST

## 2025-05-01 PROCEDURE — 99999 PR PBB SHADOW E&M-EST. PATIENT-LVL III: CPT | Mod: PBBFAC,,, | Performed by: OPTOMETRIST

## 2025-05-01 NOTE — PROGRESS NOTES
HPI     Annual Exam            Comments: Vision changes since last eye exam?: yes, pt states vision is   not clear.     Any eye pain today: no    Other ocular symptoms: no    Interested in contact lens fitting today? no                     Comments    1. HX Cornea Ulcer OD  Hx K abrasion OD 10/17    OD- Pred A QD yesterday only             Last edited by Mere Portillo on 5/1/2025  8:32 AM.            Assessment /Plan     For exam results, see Encounter Report.    1. Myopia with astigmatism and presbyopia, bilateral  Eyeglass Final Rx       Eyeglass Final Rx         Sphere Cylinder Axis Add    Right -0.25 +1.50 018 +2.50    Left -0.25 +1.00 166 +2.50      Type: PAL    Expiration Date: 5/1/2026                    2. OAG (open angle glaucoma) suspect, low risk, bilateral  The patient has the following Glaucoma risk factors: Optic Nerve Asymmetry     Recommend close observation off drops at this time with annual gOCT for changes.         RTC 1 yr for dilated eye exam or sooner if any changes to vision.   Discussed above and answered questions.

## 2025-05-12 DIAGNOSIS — F41.9 ANXIETY: ICD-10-CM

## 2025-05-12 DIAGNOSIS — F51.04 PSYCHOPHYSIOLOGICAL INSOMNIA: ICD-10-CM

## 2025-05-13 RX ORDER — TRAZODONE HYDROCHLORIDE 100 MG/1
100 TABLET ORAL NIGHTLY
Qty: 90 TABLET | Refills: 0 | Status: SHIPPED | OUTPATIENT
Start: 2025-05-13

## 2025-05-13 NOTE — TELEPHONE ENCOUNTER
Care Due:                  Date            Visit Type   Department     Provider  --------------------------------------------------------------------------------                                MYCHART                              FOLLOWUP/OF  [facility]  Last Visit: 07-      [facility] VISIT   MEDICINE       Emiliano Hernández  Next Visit: None Scheduled  None         None Found                                                            Last  Test          Frequency    Reason                     Performed    Due Date  --------------------------------------------------------------------------------    CMP.........  12 months..  chlorthalidone,            07- 06-                             rosuvastatin, telmisartan    Lipid Panel.  12 months..  rosuvastatin.............  07- 06-    [facility] Embedded Care Due Messages. Reference number: [id number].   5/12/2025 10:38:13 PM CDT

## 2025-05-13 NOTE — TELEPHONE ENCOUNTER
Provider Staff:  Action required for this patient    Requires labs      Please see care gap opportunities below in Care Due Message.    Thanks!  [facility]     Appointments      Date Provider   Last Visit   7/2/2024 Emiliano Hernández MD   Next Visit   Visit date not found Emiliano Hernández MD     Refill Decision Note   Irineo Betts  is requesting a refill authorization.  Brief Assessment and Rationale for Refill:  Approve     Medication Therapy Plan:        Comments:     Note composed:6:29 AM 05/13/2025

## 2025-05-14 RX ORDER — PANTOPRAZOLE SODIUM 40 MG/1
40 TABLET, DELAYED RELEASE ORAL DAILY
Qty: 30 TABLET | Refills: 6 | Status: SHIPPED | OUTPATIENT
Start: 2025-05-14 | End: 2026-05-14

## 2025-05-22 ENCOUNTER — PATIENT MESSAGE (OUTPATIENT)
Dept: INTERNAL MEDICINE | Facility: CLINIC | Age: 55
End: 2025-05-22
Payer: COMMERCIAL

## 2025-06-24 ENCOUNTER — PATIENT MESSAGE (OUTPATIENT)
Dept: INTERNAL MEDICINE | Facility: CLINIC | Age: 55
End: 2025-06-24
Payer: COMMERCIAL

## 2025-06-27 ENCOUNTER — PATIENT MESSAGE (OUTPATIENT)
Dept: ADMINISTRATIVE | Facility: OTHER | Age: 55
End: 2025-06-27
Payer: COMMERCIAL

## 2025-07-03 ENCOUNTER — LAB VISIT (OUTPATIENT)
Dept: LAB | Facility: HOSPITAL | Age: 55
End: 2025-07-03
Attending: PEDIATRICS
Payer: COMMERCIAL

## 2025-07-03 DIAGNOSIS — E78.00 PURE HYPERCHOLESTEROLEMIA: ICD-10-CM

## 2025-07-03 DIAGNOSIS — E87.1 HYPONATREMIA: ICD-10-CM

## 2025-07-03 DIAGNOSIS — E87.6 HYPOKALEMIA: ICD-10-CM

## 2025-07-03 LAB
ANION GAP (OHS): 7 MMOL/L (ref 8–16)
BUN SERPL-MCNC: 15 MG/DL (ref 6–20)
CALCIUM SERPL-MCNC: 9.6 MG/DL (ref 8.7–10.5)
CHLORIDE SERPL-SCNC: 98 MMOL/L (ref 95–110)
CHOLEST SERPL-MCNC: 117 MG/DL (ref 120–199)
CHOLEST/HDLC SERPL: 2.9 {RATIO} (ref 2–5)
CO2 SERPL-SCNC: 30 MMOL/L (ref 23–29)
CREAT SERPL-MCNC: 0.9 MG/DL (ref 0.5–1.4)
GFR SERPLBLD CREATININE-BSD FMLA CKD-EPI: >60 ML/MIN/1.73/M2
GLUCOSE SERPL-MCNC: 88 MG/DL (ref 70–110)
HDLC SERPL-MCNC: 41 MG/DL (ref 40–75)
HDLC SERPL: 35 % (ref 20–50)
LDLC SERPL CALC-MCNC: 62.6 MG/DL (ref 63–159)
NONHDLC SERPL-MCNC: 76 MG/DL
POTASSIUM SERPL-SCNC: 4 MMOL/L (ref 3.5–5.1)
SODIUM SERPL-SCNC: 135 MMOL/L (ref 136–145)
TRIGL SERPL-MCNC: 67 MG/DL (ref 30–150)

## 2025-07-03 PROCEDURE — 80048 BASIC METABOLIC PNL TOTAL CA: CPT

## 2025-07-03 PROCEDURE — 36415 COLL VENOUS BLD VENIPUNCTURE: CPT | Mod: PN

## 2025-07-03 PROCEDURE — 80061 LIPID PANEL: CPT

## 2025-07-10 ENCOUNTER — OFFICE VISIT (OUTPATIENT)
Dept: INTERNAL MEDICINE | Facility: CLINIC | Age: 55
End: 2025-07-10
Payer: COMMERCIAL

## 2025-07-10 VITALS
RESPIRATION RATE: 17 BRPM | OXYGEN SATURATION: 99 % | BODY MASS INDEX: 27.95 KG/M2 | HEART RATE: 74 BPM | DIASTOLIC BLOOD PRESSURE: 74 MMHG | TEMPERATURE: 97 F | HEIGHT: 72 IN | SYSTOLIC BLOOD PRESSURE: 122 MMHG | WEIGHT: 206.38 LBS

## 2025-07-10 DIAGNOSIS — E78.49 OTHER HYPERLIPIDEMIA: ICD-10-CM

## 2025-07-10 DIAGNOSIS — Z12.5 PROSTATE CANCER SCREENING: ICD-10-CM

## 2025-07-10 DIAGNOSIS — K21.00 GASTROESOPHAGEAL REFLUX DISEASE WITH ESOPHAGITIS WITHOUT HEMORRHAGE: ICD-10-CM

## 2025-07-10 DIAGNOSIS — E66.811 CLASS 1 OBESITY DUE TO EXCESS CALORIES WITH SERIOUS COMORBIDITY AND BODY MASS INDEX (BMI) OF 31.0 TO 31.9 IN ADULT: ICD-10-CM

## 2025-07-10 DIAGNOSIS — J33.9 NASAL POLYPOSIS: ICD-10-CM

## 2025-07-10 DIAGNOSIS — Z12.11 COLON CANCER SCREENING: ICD-10-CM

## 2025-07-10 DIAGNOSIS — E66.09 CLASS 1 OBESITY DUE TO EXCESS CALORIES WITH SERIOUS COMORBIDITY AND BODY MASS INDEX (BMI) OF 31.0 TO 31.9 IN ADULT: ICD-10-CM

## 2025-07-10 DIAGNOSIS — I10 ESSENTIAL HYPERTENSION: ICD-10-CM

## 2025-07-10 DIAGNOSIS — E78.00 PURE HYPERCHOLESTEROLEMIA: ICD-10-CM

## 2025-07-10 DIAGNOSIS — Z00.00 WELL ADULT EXAM: Primary | ICD-10-CM

## 2025-07-10 DIAGNOSIS — F41.9 ANXIETY: ICD-10-CM

## 2025-07-10 DIAGNOSIS — Z23 NEED FOR VACCINATION AGAINST STREPTOCOCCUS PNEUMONIAE: ICD-10-CM

## 2025-07-10 PROCEDURE — 99396 PREV VISIT EST AGE 40-64: CPT | Mod: S$GLB,,, | Performed by: PEDIATRICS

## 2025-07-10 PROCEDURE — 3074F SYST BP LT 130 MM HG: CPT | Mod: CPTII,S$GLB,, | Performed by: PEDIATRICS

## 2025-07-10 PROCEDURE — 1159F MED LIST DOCD IN RCRD: CPT | Mod: CPTII,S$GLB,, | Performed by: PEDIATRICS

## 2025-07-10 PROCEDURE — 4010F ACE/ARB THERAPY RXD/TAKEN: CPT | Mod: CPTII,S$GLB,, | Performed by: PEDIATRICS

## 2025-07-10 PROCEDURE — 3078F DIAST BP <80 MM HG: CPT | Mod: CPTII,S$GLB,, | Performed by: PEDIATRICS

## 2025-07-10 PROCEDURE — 1160F RVW MEDS BY RX/DR IN RCRD: CPT | Mod: CPTII,S$GLB,, | Performed by: PEDIATRICS

## 2025-07-10 PROCEDURE — 3008F BODY MASS INDEX DOCD: CPT | Mod: CPTII,S$GLB,, | Performed by: PEDIATRICS

## 2025-07-10 PROCEDURE — 99999 PR PBB SHADOW E&M-EST. PATIENT-LVL V: CPT | Mod: PBBFAC,,, | Performed by: PEDIATRICS

## 2025-07-10 NOTE — PROGRESS NOTES
Patient ID: Irineo Betts is a 55 y.o. male.    Chief Complaint: Annual Exam    History of Present Illness    CHIEF COMPLAINT:  Patient presents today for yearly follow-up.    WEIGHT MANAGEMENT:  He reports total weight loss of approximately 40 lbs with Wegovy, with current weight around 206 lbs. Morning weight consistently near 200 lbs, with lowest weight reaching 195 lbs. He reports feeling significantly better with weight loss and overall improved health status.    HYPERTENSION:  He takes telmisartan 80 mg and chlorthalidone 25 mg for blood pressure management. He monitors blood pressure weekly at home through [facility] HTN. He experiences orthostatic dizziness when standing from a squatting position, requiring a brief pause for a few seconds, but denies feeling faint during these episodes.    GERD:  He reports improvement in acid reflux symptoms with weight loss. He continues pantoprazole 40 mg daily as prescribed, noting reduced symptoms.    CURRENT MEDICATIONS:  He takes rosuvastatin 10 mg for cholesterol management, trazodone 100 mg and sertraline 100 mg for anxiety with good response, and Singulair 10 mg and Flonase for nasal polyposis and chronic sinus problems.    PMH, PSH, SH, FH reviewed with patient.      ROS:  General: -fever, -chills, -fatigue, -weight gain, -weight loss  Eyes: -vision changes, -redness, -discharge  ENT: -ear pain, -nasal congestion, -sore throat  Cardiovascular: -chest pain, -palpitations, -lower extremity edema, +orthostatic symptoms  Respiratory: -cough, -shortness of breath  Gastrointestinal: -abdominal pain, -nausea, -vomiting, -diarrhea, -constipation, -blood in stool  Genitourinary: -dysuria, -hematuria, -frequency  Musculoskeletal: -joint pain, -muscle pain  Skin: -rash, -lesion  Neurological: -headache, -dizziness, -numbness, -tingling  Psychiatric: +anxiety, -depression, -sleep difficulty         Exam:  Physical Exam    Vitals: Weight: 206 lbs. BMI: 28.  General: No acute  distress. Well-developed. Well-nourished.  Eyes: EOMI. Sclerae anicteric.  HENT: Normocephalic. Atraumatic. Nares patent. Moist oral mucosa.  Cardiovascular: Regular rate. Regular rhythm. No murmurs. No rubs. No gallops. Normal S1, S2.  Respiratory: Normal respiratory effort. Clear to auscultation bilaterally. No rales. No rhonchi. No wheezing.  Abdomen: Soft. Non-tender. Non-distended. Normoactive bowel sounds.  Musculoskeletal: No  obvious deformity.  Extremities: No lower extremity edema.  Neurological: Alert & oriented x3. No slurred speech. Normal gait.  Psychiatric: Normal mood. Normal affect. Good insight. Good judgment.  Skin: Warm. Dry. No rash.         Assessment/Plan:  Well adult exam    Pure hypercholesterolemia  -     Lipid Panel; Future; Expected date: 07/10/2025  -     Comprehensive Metabolic Panel; Future; Expected date: 07/10/2025    Other hyperlipidemia    Gastroesophageal reflux disease with esophagitis without hemorrhage    Essential hypertension    Anxiety    Class 1 obesity due to excess calories with serious comorbidity and body mass index (BMI) of 31.0 to 31.9 in adult    Nasal polyposis    Need for vaccination against Streptococcus pneumoniae  -     pneumoc 20-darrin conj-dip cr(PF) (PREVNAR-20 (PF)) injection Syrg 0.5 mL    Prostate cancer screening  -     PSA, Screening; Future; Expected date: 07/10/2025    Colon cancer screening  -     Ambulatory referral/consult to Endo Procedure ; Future; Expected date: 07/11/2025         Assessment & Plan    IMPRESSION:  - Assessed BP control and weight loss progress on current medication regimen.  - Considered reducing BP medication due to significant weight loss and reported orthostatic symptoms.  - Evaluated cholesterol management, noting excellent lab results on current statin therapy.  - Reviewed slight hyponatremia and elevated BUN, likely due to chlorthalidone; anticipate improvement with medication adjustment.  - Assessed need for  pneumococcal vaccination based on updated guidelines and potential side effects.  - Explained potential need for long-term weight management medication and uncertainties about optimal dosing strategies after reaching goal weight.  - Discussed orthostatic hypotension symptoms and their relation to BP medication.    HYPERTENSION:  - Patient monitors blood pressure at home weekly and sends results to doctors in [place].  - BP is looking good today, though borderline, causing pooling of blood in legs when changing positions.  - Due to weight loss and borderline readings, discontinued chlorthalidone 25 mg while continuing telmisartan 80 mg.  - Advised possible reduction to chlorthalidone 12.5 mg if needed.  - Patient instructed to monitor BP at home through [facility] HTN following these medication changes.    GASTROESOPHAGEAL REFLUX DISEASE:  - Acid reflux has improved with weight loss from Wegovy.  - Recommend stopping pantoprazole 40 mg and using it on an as-needed basis for symptom management.    ANXIETY DISORDER:  - Anxiety is well-managed with current medication regimen of trazodone 100 mg and sertraline 100 mg.  - Continue current medications.    NASAL POLYPS AND CHRONIC SINUSITIS:  - Continue current medication regimen of montelukast 10 mg and fluticasone nasal spray for nasal polyposis and chronic sinus problems.    HYPERLIPIDEMIA:  - Cholesterol results are excellent on rosuvastatin 10 mg.  - Continue current medication and ordered repeat labs at next visit.    OVERWEIGHT:  - Patient's weight is down to 206 lbs (BMI 28) after losing almost 40 lbs with Wegovy.  - Discussed long-term weight management, including potential dose adjustments.  - Continue Wegovy.    IMMUNIZATION AND PREVENTIVE CARE:  - Administered pneumococcal vaccination in office today.  - Ordered colonoscopy and prostate blood test for next visit.    FOLLOW-UP:  - Follow up in 6 months.          Visit today included increased complexity associated  with the care of the episodic problem  addressed and managing the longitudinal care of the patient due to the serious and/or complex managed problem(s) .      Follow up in about 6 months (around 1/10/2026).    This note was generated with the assistance of ambient listening technology. Verbal consent was obtained by the patient and accompanying visitor(s) for the recording of patient appointment to facilitate this note. I attest to having reviewed and edited the generated note for accuracy, though some syntax or spelling errors may persist. Please contact the author of this note for any clarification.

## 2025-07-15 DIAGNOSIS — J30.89 NON-SEASONAL ALLERGIC RHINITIS DUE TO OTHER ALLERGIC TRIGGER: ICD-10-CM

## 2025-07-15 RX ORDER — FLUTICASONE PROPIONATE 50 MCG
2 SPRAY, SUSPENSION (ML) NASAL 2 TIMES DAILY
Qty: 16 G | Refills: 11 | Status: SHIPPED | OUTPATIENT
Start: 2025-07-15

## 2025-07-27 DIAGNOSIS — F41.9 ANXIETY: ICD-10-CM

## 2025-07-27 RX ORDER — SERTRALINE HYDROCHLORIDE 100 MG/1
100 TABLET, FILM COATED ORAL DAILY
Qty: 90 TABLET | Refills: 3 | Status: SHIPPED | OUTPATIENT
Start: 2025-07-27

## 2025-07-27 NOTE — TELEPHONE ENCOUNTER
Care Due:                  Date            Visit Type   Department     Provider  --------------------------------------------------------------------------------                                EP -                              PRIMARY      [facility]  Last Visit: 07-      CARE ([facility])   MEDICINE       Emiliano Hernández  Next Visit: None Scheduled  None         None Found                                                            Last  Test          Frequency    Reason                     Performed    Due Date  --------------------------------------------------------------------------------    CMP.........  12 months..  rosuvastatin.............  07- 06-    [facility] Embedded Care Due Messages. Reference number: [id number].   7/27/2025 5:08:25 AM CDT

## 2025-07-28 NOTE — TELEPHONE ENCOUNTER
Refill Decision Note   Irineo Betts  is requesting a refill authorization.  Brief Assessment and Rationale for Refill:  Approve     Medication Therapy Plan:  FLOS      Comments:     Note composed:10:34 PM 07/27/2025

## 2025-08-12 ENCOUNTER — PATIENT MESSAGE (OUTPATIENT)
Dept: INTERNAL MEDICINE | Facility: CLINIC | Age: 55
End: 2025-08-12
Payer: COMMERCIAL

## 2025-08-12 DIAGNOSIS — F51.04 PSYCHOPHYSIOLOGICAL INSOMNIA: ICD-10-CM

## 2025-08-12 DIAGNOSIS — F41.9 ANXIETY: ICD-10-CM

## 2025-08-12 RX ORDER — TRAZODONE HYDROCHLORIDE 100 MG/1
100 TABLET ORAL NIGHTLY
Qty: 90 TABLET | Refills: 3 | Status: SHIPPED | OUTPATIENT
Start: 2025-08-12